# Patient Record
Sex: FEMALE | Race: WHITE | NOT HISPANIC OR LATINO | Employment: PART TIME | URBAN - METROPOLITAN AREA
[De-identification: names, ages, dates, MRNs, and addresses within clinical notes are randomized per-mention and may not be internally consistent; named-entity substitution may affect disease eponyms.]

---

## 2017-01-04 ENCOUNTER — TRANSCRIBE ORDERS (OUTPATIENT)
Dept: ADMINISTRATIVE | Facility: HOSPITAL | Age: 52
End: 2017-01-04

## 2017-01-04 DIAGNOSIS — Z12.31 OTHER SCREENING MAMMOGRAM: Primary | ICD-10-CM

## 2017-04-10 ENCOUNTER — HOSPITAL ENCOUNTER (OUTPATIENT)
Dept: MAMMOGRAPHY | Facility: MEDICAL CENTER | Age: 52
Discharge: HOME | End: 2017-04-10
Payer: COMMERCIAL

## 2017-04-10 DIAGNOSIS — Z12.31 ENCOUNTER FOR SCREENING MAMMOGRAM FOR MALIGNANT NEOPLASM OF BREAST: ICD-10-CM

## 2017-04-10 PROCEDURE — G0202 SCR MAMMO BI INCL CAD: HCPCS

## 2017-04-10 PROCEDURE — 77063 BREAST TOMOSYNTHESIS BI: CPT

## 2017-05-31 ENCOUNTER — ALLSCRIPTS OFFICE VISIT (OUTPATIENT)
Dept: OTHER | Facility: OTHER | Age: 52
End: 2017-05-31

## 2017-11-10 ENCOUNTER — TRANSCRIBE ORDERS (OUTPATIENT)
Dept: ADMINISTRATIVE | Facility: HOSPITAL | Age: 52
End: 2017-11-10

## 2017-11-10 ENCOUNTER — ALLSCRIPTS OFFICE VISIT (OUTPATIENT)
Dept: OTHER | Facility: OTHER | Age: 52
End: 2017-11-10

## 2017-11-10 DIAGNOSIS — R51.9 CHRONIC INTRACTABLE HEADACHE, UNSPECIFIED HEADACHE TYPE: Primary | ICD-10-CM

## 2017-11-10 DIAGNOSIS — G89.29 CHRONIC INTRACTABLE HEADACHE, UNSPECIFIED HEADACHE TYPE: Primary | ICD-10-CM

## 2017-11-10 DIAGNOSIS — R51.9 HEADACHE: ICD-10-CM

## 2017-11-11 NOTE — CONSULTS
Assessment    1  Migraine without status migrainosus, not intractable (346 90) (G43 909)   2  Headache, unspecified headache type (784 0) (R51)    Plan  Headache, unspecified headache type    · * MRI BRAIN WO CONTRAST; Status:Need Information - Financial Authorization; Requested for:10Nov2017;    Perform:[facility]; UXH:[id number]; Ordered;For:Headache, unspecified headache type; Ordered By:Whitney Hidalgo;  Migraine without status migrainosus, not intractable    · Amitriptyline HCl - 25 MG Oral Tablet; TAKE 1 TABLET AT BEDTIME   Rx By: Srini Brady; Dispense: 30 Days ; #:30 Tablet; Refill: 4;For: Migraine without status migrainosus, not intractable; GERTRUDE = N; Faxed To: [id number]   · Dexamethasone 2 MG Oral Tablet; TAKE 1 TABLET DAILY   Rx By: Srini Brady; Dispense: 5 Days ; #:5 Tablet; Refill: 0;Migraine without status migrainosus, not intractable; GERTRUDE = N; Faxed To: [facility]   · Frovatriptan Succinate 2 5 MG Oral Tablet; TAKE 1 TABLET AT ONSET OFHEADACHE  MAY REPEAT EVERY 2 HOURS AS NEEDED  MAXIMUM 3 TABLETS IN 24HOURS   Rx By: Srini Brady; Dispense: 0 Days ; #:8 Tablet; Refill: 1;Migraine without status migrainosus, not intractable; GERTRUDE = N; Faxed To: [facility]   · Follow-up visit in 3 months Evaluation and Treatment  Follow-up  Status: Complete Done: [id number]   Ordered;Migraine without status migrainosus, not intractable; Ordered By: Srini Brady Performed:  Due: 85DYR3633; Last Updated By: Estelle Ramirez; 11/10/2017 10:10:53 AM    Discussion/Summary  This is a 46year old  female who is here today as a new patient for headaches  Her headaches are migrainous in nature, but has a new type of headache since July which she states is different    I want to get MRI brain to make sure there is no structural abnormality   -Will do dexamethasone 2mg PO x 5 days as bridge therapy -will start amitriptyline 25 mg at bedtime once Decadron is finished  -advise her to wean off Topamax 25 every week, so 50 mg 25 mg from week and then stop  -Frovatriptan p r n  for abortive therapies she has menstrual migraines and slower onset with a gradual increase  Follow up with me in 3 months -encouraged regular hydration and sleep routine and to maintain headache diary  Advised the patient/guardian if they have any stroke like symptoms such as facial droop, weakness on either side, speech trouble, numbness, balance issues, any vision changes, or any new headache, to call 9-1-1 immediately or to proceed to the nearest ER immediately  Patient/guardian was counseled regarding instructions for management  Possible side effects of new medications were reviewed with the patient/guardian today  Treatment plan was reviewed with the patient/guardian  The patient/guardian understands and agrees with the plan  Chief Complaint  Chief Complaint Free Text Note Form: Patient present for consultation regarding migraine      History of Present Illness  This is a 46year old  female who is here today for evaluation of headaches  HPI:    51-year-old female who is here today as a new patient for evaluation of headaches  She states she started having headaches at the age of 6  She has some all day every day  she has muliple types of headaches: sharp pains, tenderness on the scalp, and then stress headaches  They are located on bifrontally, cervicogenic occipital, bitemporal   SHe describes her headache as throbbing, shooting, dull, searing  Associated symptoms with her headache:  Nausea, decreased appetite, sensitivity to light and sound  She prefers to lay in a quiet dark room  Triggers for her headaches are stress and menstruation  Abortive medications:  Excedrin, Motrin   Preventative medications: Topamax 25 mg 4 tabs daily which was started 5 years ago - initially was working both with intensity and frequency of her headache, but since July 2017 they were becoming more frequent, with scalp tenderness/sharp pain, which changed and her PCP recently changed to Topamax 100mg PO qdaily, and then she did not notice any change, and was changed back to 75mg PO qdaily  She has not had any recent imaging recently  Caffeine intake:      Review of Systems  Neurological ROS:  Constitutional: no fever, no chills, no recent weight gain, no recent weight loss, no complaints of feeling tired, no changes in appetite  HEENT: dryness of the eyes  Cardiovascular:  no chest pain or pressure, no palpitations present, the heart rate was not rapid or irregular, no swelling in the arms or legs, no poor circulation  Respiratory:  no unusual or persistant cough, no shortness of breath with or without exertion  Gastrointestinal:  no nausea, no vomiting, no diarrhea, no abdominal pain, no changes in bowel habits, no melena, no loss of bowel control  Genitourinary:  no incontinence, no feelings of urinary urgency, no increase in frequency, no urinary hesitancy, no dysuria, no hematuria  Musculoskeletal: head/neck/back pain  Integumentary  no masses, no rash, no skin lesions, no livedo reticularis  Psychiatric:  no anxiety, no depression, no mood swings, no psychiatric hospitalizations, no sleep problems  Endocrine  no unusual weight loss or gain, no excessive urination, no excessive thirst, no hair loss or gain, no hot or cold intolerance, no menstrual period change or irregularity, no loss of sexual ability or drive, no erection difficulty, no nipple discharge  Hematologic/Lymphatic:  no unusual bleeding, no tendency for easy bruising, no clotting skin or lumps  Neurological General: headache-- and-- waking up at night  Neurological Mental Status:  no confusion, no mood swings, no alteration or loss of consciousness, no difficulty expressing/understanding speech, no memory problems  Neurological Cranial Nerves: facial numbness or weakness    Neurological Motor findings include:  no tremor, no twitching, no cramping(pre/post exercise), no atrophy  Neurological Coordination:  no unsteadiness, no vertigo or dizziness, no clumsiness, no problems reaching for objects  Neurological Sensory:  no numbness, no pain, no tingling, does not fall when eyes closed or taking a shower  Neurological Gait:  no difficulty walking, not falling to one side, no sensation of being pushed, has not had falls  ROS Reviewed:   ROS reviewed  Active Problems    1  Benign essential hypertension (401 1) (I10)   2  Degenerative cervical disc (722 4) (M50 30)   3  Encounter for gynecological examination without abnormal finding (V72 31) (Z01 419)   4  Encounter for screening mammogram for malignant neoplasm of breast (V76 12) (Z12 31)   5  Gastroesophageal reflux disease without esophagitis (530 81) (K21 9)   6  Hyperlipidemia (272 4) (E78 5)   7  Irritable bowel syndrome (564 1) (K58 9)   8  Migraine without status migrainosus, not intractable (346 90) (G43 909)   9  Onychomycosis (110 1) (B35 1)   10  Other chest pain (786 59) (R07 89)    Past Medical History    1  History of Acute reaction to stress (308 9) (F43 0)   2  History of Acute reaction to stress (308 9) (F43 0)   3  History of Acute upper respiratory infection (465 9) (J06 9)   4  History of Amenorrhea (626 0) (N91 2)   5  History of Blood pressure elevated (401 9) (I10)   6  History of Colon cancer screening (V76 51) (Z12 11)   7  History of Cough (786 2) (R05)   8  History of Encounter for routine gynecological examination (V72 31) (Z01 419)   9  History of Encounter for routine gynecological examination with Papanicolaou smear of cervix (V72 31,V76 2) (Z01 419)   10  History of abdominal pain (V13 89) (Z87 898)   11  History of anemia (V12 3) (Z86 2)   12  History of chest pain (V13 89) (Z87 898)   13  History of contact dermatitis (V13 3) (Z87 2)   14  History of Crohn's disease (V12 70) (Z87 19)   15  History of diarrhea (V12 79) (Z87 898)   16   History of folliculitis (V13 3) (Z87 2)   17  History of glaucoma (V12 49) (Z86 69)   18  History of menorrhagia (V13 29) (Z87 42)   19  History of upper respiratory infection (V12 09) (Z87 09)   20  History of urinary frequency (V13 09) (Z87 898)   21  History of urinary frequency (V13 09) (Z87 898)   22  History of Need for influenza vaccination (V04 81) (Z23)   23  History of Other specified menopausal and perimenopausal disorders (627 8) (N95 8)   24  History of Other specified menopausal and perimenopausal disorders (627 8) (N95 8)   25  History of Rib cage region somatic dysfunction (739 8) (M99 08)   26  History of Screening for depression (V79 0) (Z13 89)   27  History of Screening for human papillomavirus (HPV) (V73 81) (Z11 51)   28  History of Urinary tract infection (599 0) (N39 0)   29  History of Vitamin D deficiency disease (268 9) (E55 9)  Active Problems And Past Medical History Reviewed: The active problems and past medical history were reviewed and updated today  Surgical History  1  History of Ablation Of Vaginal Lesion(S)   2  History of  Section   3  History of Colonoscopy   4  History of Diagnostic Esophagogastroduodenoscopy   5  History of Dilation And Curettage  Surgical History Reviewed: The surgical history was reviewed and updated today  Family History  Mother    1  Family history of arthritis (V17 7) (Z82 61)   2  Family history of colitis (V18 59) (Z83 79)  Father    3  Family history of hypertension (V17 49) (Z82 49)   4  Family history of non-Hodgkin's lymphoma (V16 7) (Z80 7)  Family History Reviewed: The family history was reviewed and updated today  Social History     · Being A Social Drinker   · Never A Smoker  Social History Reviewed: The social history was reviewed and updated today  The social history was reviewed and is unchanged  Current Meds   1  Dicyclomine HCl - 20 MG Oral Tablet; TAKE 1 TABLET EVERY 6 HOURS AS NEEDED;  Therapy: 55CCZ3440 to (Evaluate:79Mzn5501) Requested for: 97Eud8714; Last Rx:54Oee9032 Ordered   2  Famotidine 20 MG Oral Tablet; TAKE 1 TABLET TWICE DAILY; Therapy: 76IMH3460 to (Evaluate:36Rcd8025)  Requested for: 52CFL7339; Last Rx:18Wxb0446 Ordered   3  Losartan Potassium 25 MG Oral Tablet; TAKE 1/2 TO 1 TABLET BY MOUTH DAILY; Therapy: 69KNF6891 to (Marlin Sanchez)  Requested for: 34NPP8599; Last Rx:07Upv1335 Ordered   4  Lumigan 0 01 % Ophthalmic Solution; INSTILL 1 DROP INTO BOTH EYES ONCE DAILY IN THE EVENING; Therapy: (Ayad Barahona) to Recorded   5  Rizatriptan Benzoate 10 MG Oral Tablet; TAKE 1 TABLET AT ONSET OF MIGRAINE  MAY REPEAT IN 2 HOURS IF NEEDED  DO NOT EXCEED 3 TABS IN 24 HOURS; Therapy: 59WOO9648 to (Evaluate:80Irj1578)  Requested for: 05CLB6821; Last Rx:17Qwo6914 Ordered   6  Topiramate 25 MG Oral Tablet; TAKE 4 TABLET Daily; Therapy: 84Elu7666 to (Evaluate:98Ine1752)  Requested for: 39ZKS8069; Last Rx:02Ohq5438 Ordered  Medication List Reviewed: The medication list was reviewed and updated today  Allergies    1  Sulfa Drugs    Vitals  Signs   Recorded: 39CSW4776 09:30AM   Heart Rate: 74  Respiration: 16  Systolic: 888  Diastolic: 78  Height: 5 ft 6 in  Weight: 149 lb   BMI Calculated: 24 05  BSA Calculated: 1 76  O2 Saturation: 99    Physical Exam  General: Patient is not in any acute distress  Mental status: Alert, awake oriented x 3 and follows commands Heart: Normal heart sounds, no murmurs, rubs or gallops Chest: Clear to auscultation bilaterally Abdomen: soft and non-tender  Neurologic Examination:  Speech: Speech is fluent, reading, writing, naming, repetition and comprehension intact  Cranial Nerves: Pupils equal round reactive to light and extraocular movements intact  Visual fields full to confrontation  Fundus exam - normal, no papilledema noted  Facial sensation intact to soft touch in V1, V2 and V3 distributions  Face symmetric  Tongue midline, able to move side to side  Shoulder shrug strong     Motor: Strength 5/5 in all 4 extremities  No drift  Normal rapid alternating movements  Normal tone Sensory: Sensation intact to soft touch in all 4 extremities  Cerebellar: Finger-to-nose intact, normal heel to shin  Reflexes: 2+ in all 4 extremities, downgoing toes bilaterally Gait: Normal gait, tandem walking, normal arm to swing        Future Appointments    Date/Time Provider Specialty Site   01/02/2018 06:00 PM Kathyanne Sicard, DO Obstetrics/Gynecology [facility]   28/79/4381 73:18 PM [facility]       Signatures   Electronically signed by : Dante Nguyen MD; Nov 10 2017 10:11AM EST                       (Author)

## 2017-11-24 ENCOUNTER — HOSPITAL ENCOUNTER (OUTPATIENT)
Dept: MRI IMAGING | Facility: HOSPITAL | Age: 52
Discharge: HOME | End: 2017-11-24
Attending: PSYCHIATRY & NEUROLOGY
Payer: COMMERCIAL

## 2017-11-24 DIAGNOSIS — R51.9 HEADACHE: ICD-10-CM

## 2017-11-24 PROCEDURE — 70551 MRI BRAIN STEM W/O DYE: CPT

## 2017-11-30 ENCOUNTER — ALLSCRIPTS OFFICE VISIT (OUTPATIENT)
Dept: OTHER | Facility: OTHER | Age: 52
End: 2017-11-30

## 2017-12-05 NOTE — PROGRESS NOTES
Assessment    1  Benign essential hypertension (401 1) (I10)   2  Migraine without status migrainosus, not intractable (346 90) (G43 909)   3  Gastroesophageal reflux disease without esophagitis (530 81) (K21 9)    Plan  Hyperlipidemia    · (1) CBC/ PLT (NO DIFF); Status:Active - Retrospective By Protocol Authorization; Requested for:01Nov2018 03:10PM;    · (1) COMPREHENSIVE METABOLIC PANEL; Status:Active - Retrospective By Protocol  Authorization; Requested for:01Nov2018 03:10PM;    · (1) LIPID PANEL, FASTING; Status:Active - Retrospective By Protocol Authorization; Requested for:01Nov2018 03:10PM;    · (1) TSH; Status:Active - Retrospective By Protocol Authorization; Requested  [facility]:[id number] 03:10PM;     Discussion/Summary      1  HTN: stable, home reading of BP are also stable, patient to continue taking the losartan    2  Migraine: Stable  Patient being seen by neurology  Recently had medications changed which have improved the migraines  3  GERD: Stable  Continue with famotidine  Will see patient in 1 year  Labs ordered for one year: CBC, CMP, Lipid Panel, TSH  The patient was counseled regarding instructions for management  total time of encounter was 15 minutes and >50% minutes was spent counseling  Chief Complaint  F/U HTN  ksd,cma      History of Present Illness  The patient presents for follow-up of essential hypertension  The patient states she has been doing well with her blood pressure control since the last visit  Symptoms: denies impaired vision, denies dyspnea, denies chest pain and denies lower extremity edema  The patient presents with complaints of no headache (Has migraines but controlled, nothing related to BP)  Home monitoring: The patient checks her blood pressure regularly (checks once a week, just takes the Losartan PRN, )  Typical morning blood pressure readings are <954 systolic and <47 diastolic  Blood pressure control has been good     Medications: the patient is adherent with her medication regimen  (Takes Losartan PRN based off of BP reading ) She denies medication side effects  Patient presents to office today for 6 month follow up of blood pressure  Review of Systems    Eyes: no eyesight problems  Cardiovascular: no chest pain and no palpitations  Respiratory: no shortness of breath  Gastrointestinal: no nausea, no vomiting and no diarrhea  Neurological: no headache, no dizziness and no fainting  ROS reviewed  Active Problems    1  Benign essential hypertension (401 1) (I10)   2  Degenerative cervical disc (722 4) (M50 30)   3  Encounter for gynecological examination without abnormal finding (V72 31) (Z01 419)   4  Encounter for screening mammogram for malignant neoplasm of breast (V76 12)   (Z12 31)   5  Gastroesophageal reflux disease without esophagitis (530 81) (K21 9)   6  Headache, unspecified headache type (784 0) (R51)   7  Hyperlipidemia (272 4) (E78 5)   8  Irritable bowel syndrome (564 1) (K58 9)   9  Migraine without status migrainosus, not intractable (346 90) (G43 909)   10  Onychomycosis (110 1) (B35 1)   11  Other chest pain (786 59) (R07 89)    Past Medical History    1  History of Acute reaction to stress (308 9) (F43 0)   2  History of Acute reaction to stress (308 9) (F43 0)   3  History of Acute upper respiratory infection (465 9) (J06 9)   4  History of Amenorrhea (626 0) (N91 2)   5  History of Blood pressure elevated (401 9) (I10)   6  History of Colon cancer screening (V76 51) (Z12 11)   7  History of Cough (786 2) (R05)   8  History of Encounter for routine gynecological examination (V72 31) (Z01 419)   9  History of Encounter for routine gynecological examination with Papanicolaou smear of   cervix (V72 31,V76 2) (Z01 419)   10  History of abdominal pain (V13 89) (Z87 898)   11  History of anemia (V12 3) (Z86 2)   12  History of chest pain (V13 89) (Z87 898)   13  History of contact dermatitis (V13 3) (Z87 2)   14  History of Crohn's disease (V12 70) (Z87 19)   15  History of diarrhea (V12 79) (Z87 898)   16  History of folliculitis (C06 3) (Y92 1)   17  History of glaucoma (V12 49) (Z86 69)   18  History of menorrhagia (V13 29) (Z87 42)   19  History of upper respiratory infection (V12 09) (Z87 09)   20  History of urinary frequency (V13 09) (Z87 898)   21  History of urinary frequency (V13 09) (Z87 898)   22  History of Need for influenza vaccination (V04 81) (Z23)   23  History of Other specified menopausal and perimenopausal disorders (627 8) (N95 8)   24  History of Other specified menopausal and perimenopausal disorders (627 8) (N95 8)   25  History of Rib cage region somatic dysfunction (739 8) (M99 08)   26  History of Screening for depression (V79 0) (Z13 89)   27  History of Screening for human papillomavirus (HPV) (V73 81) (Z11 51)   28  History of Urinary tract infection (599 0) (N39 0)   29  History of Vitamin D deficiency disease (268 9) (E55 9)    Surgical History    1  History of Ablation Of Vaginal Lesion(S)   2  History of  Section   3  History of Colonoscopy   4  History of Diagnostic Esophagogastroduodenoscopy   5  History of Dilation And Curettage    Family History  Mother    1  Family history of arthritis (V17 7) (Z82 61)   2  Family history of colitis (V18 59) (Z83 79)  Father    3  Family history of hypertension (V17 49) (Z82 49)   4  Family history of non-Hodgkin's lymphoma (V16 7) (Z80 7)    Social History    · Being A Social Drinker   · Never A Smoker  The social history was reviewed and updated today  The social history was reviewed and is unchanged  Current Meds   1  Amitriptyline HCl - 25 MG Oral Tablet; TAKE 1 TABLET AT BEDTIME; Therapy: 52HFX2400 to (Evaluate:2018); Last Rx:2017 Ordered   2  Dicyclomine HCl - 20 MG Oral Tablet; TAKE 1 TABLET EVERY 6 HOURS AS NEEDED; Therapy: 59ZKR0927 to (Evaluate:32Ffe7763)  Requested for: 55Jbk4748; Last   Rx:02Uqg1433 Ordered   3  Famotidine 20 MG Oral Tablet; TAKE 1 TABLET TWICE DAILY; Therapy: 31NWW6733 to (Evaluate:14Mar2018)  Requested for: 89TAR2355; Last   Rx:14Nov2017 Ordered   4  Frovatriptan Succinate 2 5 MG Oral Tablet; TAKE 1 TABLET AT ONSET OF HEADACHE  MAY REPEAT EVERY 2 HOURS AS NEEDED  MAXIMUM 3 TABLETS IN 24 HOURS; Therapy: 74WGK6570 to (Last Rx:10Nov2017) Ordered   5  Losartan Potassium 25 MG Oral Tablet; TAKE 1/2 TO 1 TABLET BY MOUTH DAILY; Therapy: 84VBC1889 to (Robby Guillermo)  Requested for: 77CKJ5604; Last   Rx:05Hfc6998 Ordered   6  Lumigan 0 01 % Ophthalmic Solution; INSTILL 1 DROP INTO BOTH EYES ONCE DAILY   IN THE EVENING; Therapy: (Emogene Puff) to Recorded   7  Rizatriptan Benzoate 10 MG Oral Tablet; TAKE 1 TABLET AT ONSET OF MIGRAINE  MAY   REPEAT IN 2 HOURS IF NEEDED  DO NOT EXCEED 3 TABS IN 24 HOURS; Therapy: 70CDB5681 to (Evaluate:24Zto7614)  Requested for: 93GBB2657; Last   Rx:94Yfr7372 Ordered    The medication list was reviewed and updated today  Allergies    1  Sulfa Drugs    Vitals  Vital Signs    Recorded: 53HFN5822 22:98RH   Systolic 646   Diastolic 80   Height 5 ft 6 in   Weight 152 lb 2 oz   BMI Calculated 24 55   BSA Calculated 1 78     Physical Exam    Constitutional   General appearance: No acute distress, well appearing and well nourished  Eyes   Conjunctiva and lids: No swelling, erythema or discharge  Ears, Nose, Mouth, and Throat   External inspection of ears and nose: Normal     Pulmonary   Respiratory effort: No increased work of breathing or signs of respiratory distress  Auscultation of lungs: Clear to auscultation  Cardiovascular   Auscultation of heart: Normal rate and rhythm, normal S1 and S2, without murmurs      Musculoskeletal   Gait and station: Normal     Psychiatric   Orientation to person, place, and time: Normal     Mood and affect: Normal          Health Management  History of Colon cancer screening   COLONOSCOPY (GI, SURG); every 10 years; Last 03DUA8872; Next Due: 74Key9509;  Active    Future Appointments    Date/Time Provider Specialty Site   01/02/2018 06:00 PM Janetta Closs, DO Obstetrics/Gynecology [facility]   02/12/2018 02:00 PM Jeffrey Ballesteros MD Neurology  [address] [facility]   41/87/4963 35:13 PM [facility] Medicine [facility]     Signatures   Electronically signed by : rBiseyda Mcgrath DO; Nov 30 5879  3:25PM EST                       (Author)

## 2017-12-21 ENCOUNTER — GENERIC CONVERSION - ENCOUNTER (OUTPATIENT)
Dept: OTHER | Facility: OTHER | Age: 52
End: 2017-12-21

## 2018-01-02 ENCOUNTER — GENERIC CONVERSION - ENCOUNTER (OUTPATIENT)
Dept: OTHER | Facility: OTHER | Age: 53
End: 2018-01-02

## 2018-01-02 ENCOUNTER — ALLSCRIPTS OFFICE VISIT (OUTPATIENT)
Dept: OTHER | Facility: OTHER | Age: 53
End: 2018-01-02

## 2018-01-03 ENCOUNTER — LAB REQUISITION (OUTPATIENT)
Dept: LAB | Facility: HOSPITAL | Age: 53
End: 2018-01-03
Payer: COMMERCIAL

## 2018-01-03 DIAGNOSIS — R35.0 FREQUENCY OF MICTURITION: ICD-10-CM

## 2018-01-03 LAB
BILIRUB UR QL STRIP: NEGATIVE
CLARITY UR: CLEAR
COLOR UR: YELLOW
GLUCOSE UR STRIP-MCNC: NEGATIVE MG/DL
HGB UR QL STRIP.AUTO: NEGATIVE
KETONES UR STRIP-MCNC: NEGATIVE MG/DL
LEUKOCYTE ESTERASE UR QL STRIP: NEGATIVE
NITRITE UR QL STRIP: NEGATIVE
PH UR STRIP.AUTO: 7 [PH] (ref 4.5–8)
PROT UR STRIP-MCNC: NEGATIVE MG/DL
SP GR UR STRIP.AUTO: 1.02 (ref 1–1.03)
UROBILINOGEN UR QL STRIP.AUTO: 0.2 E.U./DL

## 2018-01-03 PROCEDURE — 81003 URINALYSIS AUTO W/O SCOPE: CPT | Performed by: OBSTETRICS & GYNECOLOGY

## 2018-01-03 NOTE — PROGRESS NOTES
Assessment   1  Encounter for routine gynecological examination (V72 31) (Z01 419)   2  Increased frequency of urination (788 41) (R35 0)    Plan   Dense breast tissue, Encounter for screening mammogram for malignant neoplasm of    breast    · MAMMO SCREENING BILATERAL W 3D & CAD; Status:Active - Retrospective    Authorization; Requested for:[id number]; Perform:[facility]; Due:84Dis7622; Last Updated Nakiaremy Lange; 1/2/2018 7:03:25 PM;Ordered; For:Dense breast tissue, Encounter for screening mammogram for malignant neoplasm of breast; Ordered By:Amaya Bunn; Increased frequency of urination    · (1) URINALYSIS w URINE C/S REFLEX (will reflex a microscopy if leukocytes, occult    blood, or nitrites are not within normal limits); Status:Active - Retrospective Authorization; Requested UQW:[id number]; Perform:[address]; JKW:[id number]; Last Updated Avril Wynn; 1/2/2018 6:54:49 PM;Ordered; For:Increased frequency of urination; Ordered By:Amaya Bunn; Discussion/Summary   health maintenance visit Currently, she eats an adequate diet and has an inadequate exercise regimen  cervical cancer screening is needed every three years next cervical cancer screening is due 2019 Breast cancer screening: monthly self breast exam was advised, mammogram has been ordered and mammogram is needed every year  Colorectal cancer screening: colorectal cancer screening is current  Advice and education were given regarding weight bearing exercise and reproductive health  Patient discussion: discussed with the patient  Annual examination was completed  Mammogram was ordered  We discussed her bladder related issues  I have suggested a program of regular aerobic exercise, dietary modification with bladder irritants  We also discussed Kegel's exercises in detail and she will implement this over the next 2 months   I also briefly discussed the benefit of vaginal estrogen therapy  The patient was initially somewhat reluctant to even consider this however will keep an open mind in the future  Patient was sent for urinalysis to rule out infectious etiologies patient to return to the office in 2 and half months for re-evaluation and potential initiation of vaginal estrogen  The patient has the current Goals: Maintain health  The patent has the current Barriers: None  Patient is able to Self-Care  Chief Complaint   Yearly- Patient had one episode of bleeding since ablation  Patient is having urinary frequency and urgency within this past year  History of Present Illness   HPI: Patient returns for annual gyn visit  Since last being seen patient is still without menses  She is bothered by urinary frequency as well as some urgency that has been more pronounced in the last 6 months  She has no postmenopausal bleeding  She has had screening colonoscopy  She has recent issues with headaches that are currently being managed by Neurology  GYN , Adult Female [facility]: The patient is being seen for a gynecology evaluation  General Health:      Lifestyle:  She does not exercise regularly  -- She does not use tobacco  The patient has never smoked cigarettes  -- She consumes alcohol  She reports occasional alcohol use  She typically drinks beer  -- She denies drug use  Reproductive health: the patient is perimenopausal--   she reports no menstrual problems  Menstrual history: Menstrual Problems: amenorrhea,-- ablation 2009 -- she uses contraception  For contraception, she has a partner with a vasectomy  -- she is sexually active  -- pregnancy history: G 4P 3(miscarriages: 1 )  Screening: Cervical cancer screening includes a pap smear performed 7/13/2015,-- human papilloma virus screening performed 7/13/2015-- and-- negative pap / negative HPV  Breast cancer screening includes a mammogram performed 4/10/2017-- and-- benign  Recommend 3-D   Colorectal cancer screening includes a colonoscopy performed 9/15/2016-- and-- 10 year intervals  Review of Systems        Constitutional: No fever, no chills, feels well, no tiredness, no recent weight gain or loss  ENT: no ear ache, no loss of hearing, no nosebleeds or nasal discharge, no sore throat or hoarseness  Cardiovascular: no complaints of slow or fast heart rate, no chest pain, no palpitations, no leg claudication or lower extremity edema  Respiratory: no complaints of shortness of breath, no wheezing, no dyspnea on exertion, no orthopnea or PND  Breasts: no complaints of breast pain, breast lump or nipple discharge  Gastrointestinal: no complaints of abdominal pain, no constipation, no nausea or diarrhea, no vomiting, no bloody stools  Genitourinary: as noted in HPI  Musculoskeletal: no complaints of arthralgia, no myalgia, no joint swelling or stiffness, no limb pain or swelling  Integumentary: no complaints of skin rash or lesion, no itching or dry skin, no skin wounds  Neurological: as noted in HPI  Active Problems   1  Acute bronchitis, unspecified organism (466 0) (J20 9)   2  Benign essential hypertension (401 1) (I10)   3  Degenerative cervical disc (722 4) (M50 30)   4  Encounter for gynecological examination without abnormal finding (V72 31) (Z01 419)   5  Encounter for screening mammogram for malignant neoplasm of breast (V76 12)     (Z12 31)   6  Gastroesophageal reflux disease without esophagitis (530 81) (K21 9)   7  Headache, unspecified headache type (784 0) (R51)   8  Hyperlipidemia (272 4) (E78 5)   9  Irritable bowel syndrome (564 1) (K58 9)   10  Migraine without status migrainosus, not intractable (346 90) (G43 909)   11  Onychomycosis (110 1) (B35 1)   12   Other chest pain (786 59) (R07 89)    Past Medical History    · History of Acute reaction to stress (308 9) (F43 0)   · History of Acute reaction to stress (308 9) (F43 0)   · History of Acute upper respiratory infection (465 9) (J06 9)   · History of Amenorrhea (626 0) (N91 2)   · History of Blood pressure elevated (401 9) (I10)   · History of Colon cancer screening (V76 51) (Z12 11)   · History of Cough (786 2) (R05)   · History of Encounter for routine gynecological examination with Papanicolaou smear of    cervix (V72 31,V76 2) (Z01 419)   · History of abdominal pain (V13 89) (F08 922)   · History of anemia (V12 3) (Z86 2)   · History of chest pain (V13 89) (S76 527)   · History of contact dermatitis (V13 3) (Z87 2)   · History of Crohn's disease (V12 70) (Z87 19)   · History of diarrhea (V12 79) (Y88 868)   · History of folliculitis (A02 5) (D48 1)   · History of glaucoma (V12 49) (Z86 69)   · History of menorrhagia (V13 29) (Z87 42)   · History of upper respiratory infection (V12 09) (Z87 09)   · History of urinary frequency (V13 09) (K26 081)   · History of Need for influenza vaccination (V04 81) (Z23)   · History of Other specified menopausal and perimenopausal disorders (627 8) (N95 8)   · History of Other specified menopausal and perimenopausal disorders (627 8) (N95 8)   · History of Rib cage region somatic dysfunction (739 8) (M99 08)   · History of Screening for depression (V79 0) (Z13 89)   · History of Screening for human papillomavirus (HPV) (V73 81) (Z11 51)   · History of Urinary tract infection (599 0) (N39 0)   · History of Vitamin D deficiency disease (268 9) (E55 9)    Surgical History    · History of Ablation Of Vaginal Lesion(S)   · History of  Section   · History of Colonoscopy   · History of Diagnostic Esophagogastroduodenoscopy   · History of Dilation And Curettage    Family History   Mother    · Family history of arthritis (V17 7) (Z82 61)   · Family history of colitis (V18 59) (Z83 79)  Father    · Family history of hypertension (V17 49) (Z82 49)   · Family history of non-Hodgkin's lymphoma (V16 7) (Z80 7)    Social History    · Being A Social Drinker   · Never A Smoker    Current Meds    1  Amitriptyline HCl - 25 MG Oral Tablet; TAKE 1 TABLET AT BEDTIME; Therapy: 97MAJ2260 to (Evaluate:09Apr2018); Last Rx:10Nov2017 Ordered   2  Dicyclomine HCl - 20 MG Oral Tablet; TAKE 1 TABLET EVERY 6 HOURS AS NEEDED; Therapy: 62PFC0569 to (Evaluate:07Dec2016)  Requested for: 05Bnp2028; Last     Rx:09Aug2016 Ordered   3  Famotidine 20 MG Oral Tablet; TAKE 1 TABLET TWICE DAILY; Therapy: 28ZVI3455 to (Evaluate:14Mar2018)  Requested for: 27YDI0672; Last     Rx:14Nov2017 Ordered   4  Frovatriptan Succinate 2 5 MG Oral Tablet; TAKE 1 TABLET AT ONSET OF HEADACHE  MAY REPEAT EVERY 2 HOURS AS NEEDED  MAXIMUM 3 TABLETS IN 24 HOURS; Therapy: 44OJV3732 to (Last Rx:10Nov2017) Ordered   5  Lumigan 0 01 % Ophthalmic Solution; INSTILL 1 DROP INTO BOTH EYES ONCE DAILY     IN THE EVENING; Therapy: (Recorded:09Aug2016) to Recorded    Allergies   1  Sulfa Drugs    Vitals    Recorded: 62VZD6939 88:57ZD   Systolic 180, LUE, Sitting   Diastolic 76, LUE, Sitting   Height 5 ft 5 in   Weight 154 lb    BMI Calculated 25 63   BSA Calculated 1 77   LMP ablation     Physical Exam        Constitutional      General appearance: No acute distress, well appearing and well nourished  Neck      Neck: Normal, supple, trachea midline, no masses  Thyroid: Normal, no thyromegaly  Pulmonary      Respiratory effort: No increased work of breathing or signs of respiratory distress  Cardiovascular      Peripheral vascular exam: Normal pulses Throughout  Genitourinary      External genitalia: Normal and no lesions appreciated  Vagina: Normal, no lesions or dryness appreciated  Urethra: Normal        Urethral meatus: Normal        Bladder: Normal, soft, non-tender and no prolapse or masses appreciated  Cervix: Normal, no palpable masses  Uterus: Normal, non-tender, not enlarged, and no palpable masses         Adnexa/parametria: Normal, non-tender and no fullness or masses appreciated  Anus, perineum, and rectum: Normal sphincter tone, no masses, and no prolapse  Chest      Breasts: Normal and no dimpling or skin changes noted  Abdomen      Abdomen: Normal, non-tender, and no organomegaly noted  Liver and spleen: No hepatomegaly or splenomegaly  Examination for hernias: No hernias appreciated  Lymphatic      Palpation of lymph nodes in neck, axillae, groin and/or other locations: No lymphadenopathy or masses noted  Skin      Skin and subcutaneous tissue: Normal skin turgor and no rashes         Palpation of skin and subcutaneous tissue: Normal        Psychiatric      Orientation to person, place, and time: Normal        Mood and affect: Normal        Future Appointments      Date/Time Provider Specialty Site   03/06/2018 05:00 PM Dillon Briggs DO Obstetrics/Gynecology [facility]   01/14/2019 05:40 PM Dillon Briggs DO Obstetrics/Gynecology [facility]   02/12/2018 02:00 PM Courtney Santos MD Neurology [place] [address]   78/03/4078 61:87 PM [facility]     Signatures    Electronically signed by : Julia Ma DO; Jan 2 2018  7:15PM EST                       (Author)

## 2018-01-04 ENCOUNTER — GENERIC CONVERSION - ENCOUNTER (OUTPATIENT)
Dept: OTHER | Facility: OTHER | Age: 53
End: 2018-01-04

## 2018-01-10 NOTE — PROGRESS NOTES
Assessment    1  Benign essential hypertension (401 1) (I10)   2  Hyperlipidemia (272 4) (E78 5)   3  Migraine headache (346 90) (G43 909)   4  Vitamin D deficiency disease (268 9) (E55 9)    Plan  Benign essential hypertension    · Begin a limited exercise program ; Status:Complete;   Done: 12WXG0164 08:05AM   · Eat a low fat and low cholesterol diet ; Status:Complete;   Done: 51NMR0303 08:05AM   · Keep a diary of when and what you eat ; Status:Complete;   Done: 08GUL0332 08:05AM   · Take your blood pressure twice a day, varying the time of day you check it  Record the  numbers, and bring them with you to your appointment ; Status:Complete;   Done:  35DPP2259 08:05AM   · We encourage you to begin to make lifestyle changes to help control your blood  pressure  These may include losing weight, increasing your activity level, limiting salt in  your diet, decreasing alcohol intake, and eating a diet low in fat and rich in fruits  and vegetables ; Status:Complete;   Done: 79VUG9164 08:05AM   · We recommend that you bring your body mass index down to 26 ; Status:Complete;    Done: 23RFO3490 08:05AM   · We recommend that you follow the "Mediterranean diet "; Status:Complete;   Done:  68NYV2493 08:05AM   · Call [phone] if: You develop double vision (see two of everything) ;  Status:Complete;   Done: 63QDI2892 08:05AM   · Call [phone] if: Your blood pressure is frequently higher than 140/90 ;  Status:Complete;   Done: 05FWS2026 08:05AM   · Call 911 if: You experience a new kind of chest pain (angina) or pressure ;  Status:Complete;   Done: 00PLP9287 08:05AM   · Call 911 if: You have any symptoms of a stroke ; Status:Complete;   Done: 65YFK8980  08:05AM  Migraine headache    · Rizatriptan Benzoate 10 MG Oral Tablet; TAKE 1 TABLET AT ONSET OF MIGRAINE   MAY REPEAT IN 2 HOURS IF NEEDED  DO NOT EXCEED 3 TABS IN 24 HOURS    Discussion/Summary    BP and labs stable except D low  Rec at least 2000 units D q day  dana lab 1 yr  BP 6 mo  Prevnar revd  GYN UTD  Change RX to maxalt AS  Possible side effects of new medications were reviewed with the patient/guardian today  The treatment plan was reviewed with the patient/guardian  The patient/guardian understands and agrees with the treatment plan   The patient was counseled regarding diagnostic results, instructions for management, risk factor reductions, prognosis, patient and family education, impressions, risks and benefits of treatment options, importance of compliance with treatment  total time of encounter was 25 minutes and > 50% minutes was spent counseling  Chief Complaint  Pt is here for a six month fu for HL  History of Present Illness  The patient is being seen for a routine clinic follow-up of migraine headache  The patient is currently asymptomatic  The patient is currently experiencing symptoms  The patient presents for follow-up of essential hypertension  The patient states she has been doing well with her blood pressure control since the last visit  Comorbid Illnesses: hyperlip  She has no significant interval events  Symptoms: The patient is currently asymptomatic    47 yo wf for rov and lab rev  Feels well  Needs new triptan bc of $$  Now 50 tf due for cscope  Review of Systems    Constitutional: No fever, no chills, feels well, no tiredness, no recent weight gain or weight loss  Eyes: No complaints of eye pain, no red eyes, no eyesight problems, no discharge, no dry eyes, no itching of eyes  ENT: no complaints of earache, no loss of hearing, no nose bleeds, no nasal discharge, no sore throat, no hoarseness  Cardiovascular: No complaints of slow heart rate, no fast heart rate, no chest pain, no palpitations, no leg claudication, no lower extremity edema  Respiratory: No complaints of shortness of breath, no wheezing, no cough, no SOB on exertion, no orthopnea, no PND     Gastrointestinal: No complaints of abdominal pain, no constipation, no nausea or vomiting, no diarrhea, no bloody stools  Genitourinary: No complaints of dysuria, no incontinence, no pelvic pain, no dysmenorrhea, no vaginal discharge or bleeding  Musculoskeletal: No complaints of arthralgias, no myalgias, no joint swelling or stiffness, no limb pain or swelling  Integumentary: No complaints of skin rash or lesions, no itching, no skin wounds, no breast pain or lump  Neurological: No complaints of headache, no confusion, no convulsions, no numbness, no dizziness or fainting, no tingling, no limb weakness, no difficulty walking  Psychiatric: Not suicidal, no sleep disturbance, no anxiety or depression, no change in personality, no emotional problems  Endocrine: No complaints of proptosis, no hot flashes, no muscle weakness, no deepening of the voice, no feelings of weakness  Hematologic/Lymphatic: No complaints of swollen glands, no swollen glands in the neck, does not bleed easily, does not bruise easily  Active Problems    1  Benign essential hypertension (401 1) (I10)   2  Blood pressure elevated (401 9) (I10)   3  Degenerative cervical disc (722 4) (M50 30)   4  Encounter for routine gynecological examination (V72 31) (Z01 419)   5  Encounter for routine gynecological examination with Papanicolaou smear of cervix   (V72 31,V76 2) (Z01 419)   6  Encounter for screening mammogram for malignant neoplasm of breast (V76 12)   (Z12 31)   7  Hyperlipidemia (272 4) (E78 5)   8  Irritable bowel syndrome (564 1) (K58 9)   9  Migraine headache (346 90) (G43 909)   10  Onychomycosis (110 1) (B35 1)   11  Other specified menopausal and perimenopausal disorders (627 8) (N95 8)   12  Screening for human papillomavirus (HPV) (V73 81) (Z11 51)   13  Vitamin D deficiency disease (268 9) (E55 9)    Past Medical History    1  History of Acute reaction to stress (308 9) (F43 0)   2  History of Acute reaction to stress (308 9) (F43 0)   3   History of Amenorrhea (626 0) (N91 2)   4  History of Cough (786 2) (R05)   5  History of chest pain (V13 89) (Z87 898)   6  History of contact dermatitis (V13 3) (Z87 2)   7  History of Crohn's disease (V12 70) (Z87 19)   8  History of folliculitis (Q23 0) (Y27 0)   9  History of glaucoma (V12 49) (Z86 69)   10  History of menorrhagia (V13 29) (Z87 42)   11  History of upper respiratory infection (V12 09) (Z87 09)   12  History of urinary frequency (V13 09) (Z87 898)   13  History of urinary frequency (V13 09) (Z87 898)   14  History of Need for influenza vaccination (V04 81) (Z23)   15  History of Other specified menopausal and perimenopausal disorders (627 8) (N95 8)   16  History of Rib cage region somatic dysfunction (739 8) (M99 08)   17  History of Urinary tract infection (599 0) (N39 0)    Surgical History    1  History of Ablation Of Vaginal Lesion(S)   2  History of  Section   3  History of Dilation And Curettage    Family History  Mother    1  Family history of arthritis (V17 7) (Z82 61)  Father    2  Family history of hypertension (V17 49) (Z82 49)   3  Family history of non-Hodgkin's lymphoma (V16 7) (Z80 7)    Social History    · Being A Social Drinker   · Never A Smoker    Current Meds   1  Losartan Potassium 25 MG Oral Tablet; TAKE 1/2-1 TABLET BY MOUTH ONCE DAILY; Therapy: 16PMI3262 to (Madie Found)  Requested for: 07GMB5175; Last   Rx:2015 Ordered   2  Relpax 40 MG Oral Tablet; takeone tab at onset of migraine  may repeat in 2hours  no   more than 2 tabs in 24hours  no more than 3 days week; Therapy: 07XNM1311 to (Evaluate:60Kxs0449)  Requested for: 28PFB5040; Last   Rx:83Ozb7637 Ordered   3  Topiramate 25 MG Oral Tablet; take 3 tablets daily; Therapy: 21Ryl4858 to (Evaluate:83Xbl0072)  Requested for: 89Iva8588; Last   Rx:09Bro7353 Ordered    Allergies    1   Sulfa Drugs    Vitals  Vital Signs [Data Includes: Current Encounter]    Recorded: 03VLZ4574 96:40YP   Systolic 139   Diastolic 78   Height 5 ft 6 3 in   Weight 139 lb 6 08 oz   BMI Calculated 22 29   BSA Calculated 1 72     Physical Exam    Constitutional   General appearance: No acute distress, well appearing and well nourished  Eyes   Conjunctiva and lids: No swelling, erythema or discharge  Pupils and irises: Equal, round and reactive to light  Ears, Nose, Mouth, and Throat   External inspection of ears and nose: Normal     Otoscopic examination: Tympanic membranes translucent with normal light reflex  Canals patent without erythema  Nasal mucosa, septum, and turbinates: Normal without edema or erythema  Oropharynx: Normal with no erythema, edema, exudate or lesions  Pulmonary   Respiratory effort: No increased work of breathing or signs of respiratory distress  Auscultation of lungs: Clear to auscultation  Cardiovascular   Auscultation of heart: Normal rate and rhythm, normal S1 and S2, without murmurs  Examination of extremities for edema and/or varicosities: Normal     Carotid pulses: Normal     Abdomen   Abdomen: Non-tender, no masses  Liver and spleen: No hepatomegaly or splenomegaly  Lymphatic   Palpation of lymph nodes in neck: No lymphadenopathy  Musculoskeletal   Gait and station: Normal     Digits and nails: Normal without clubbing or cyanosis  Inspection/palpation of joints, bones, and muscles: Normal     Neurologic   Cranial nerves: Cranial nerves 2-12 intact      Psychiatric   Orientation to person, place, and time: Normal     Mood and affect: Normal          Results/Data  Encounter Results   (1) LIPID PANEL, FASTING 69NKP1577 20:73JT Davion Paul     Test Name Result Flag Reference   CHOLESTEROL 227     HDL,DIRECT 67     LDL CHOLESTEROL CALCULATED 151     TRIGLYCERIDES 47       (1) THIN PREP PAP WITH IMAGING 50HQH7638 71:84FY Davion Paul     Test Name Result Flag Reference   THIN PREP PAP W  IMAG COMMENT 07/13/2015       Summary / No summary entered :      No summary entered  Documents attached : GYN HM/Annual Visit (GYN HM/Annual Visit) - Teresa Cota; Enc: [id number] -      Appointment - Teresa Villatoroland - (Obstetrics/Gynecology) (Additional Information      Document)    Future Appointments    Date/Time Provider Specialty Site   17/18/9242 37:54 PM [facility]     Signatures   Electronically signed by : Efrain Mendoza DO; May 17 2166  8:06AM EST                       (Author)

## 2018-01-13 VITALS
BODY MASS INDEX: 23.95 KG/M2 | SYSTOLIC BLOOD PRESSURE: 114 MMHG | HEIGHT: 66 IN | DIASTOLIC BLOOD PRESSURE: 68 MMHG | WEIGHT: 149 LBS

## 2018-01-14 VITALS
DIASTOLIC BLOOD PRESSURE: 80 MMHG | WEIGHT: 152.13 LBS | SYSTOLIC BLOOD PRESSURE: 126 MMHG | BODY MASS INDEX: 24.45 KG/M2 | HEIGHT: 66 IN

## 2018-01-14 VITALS
OXYGEN SATURATION: 99 % | HEIGHT: 66 IN | HEART RATE: 74 BPM | SYSTOLIC BLOOD PRESSURE: 118 MMHG | BODY MASS INDEX: 23.95 KG/M2 | WEIGHT: 149 LBS | RESPIRATION RATE: 16 BRPM | DIASTOLIC BLOOD PRESSURE: 78 MMHG

## 2018-01-23 VITALS
HEIGHT: 65 IN | SYSTOLIC BLOOD PRESSURE: 120 MMHG | BODY MASS INDEX: 25.66 KG/M2 | DIASTOLIC BLOOD PRESSURE: 76 MMHG | WEIGHT: 154 LBS

## 2018-01-23 NOTE — MISCELLANEOUS
Message   Recorded as Task   Date: 01/04/2018 01:41 PM, Created By: Austin August   Task Name: Follow Up   Assigned To: Edwardoisai Ziyad   Regarding Patient: Nancy Bhatti, Status: In Progress   Massimo Slim - 04 Jan 2018 1:41 PM     TASK CREATED  inform pt UA negative   Hailey Mccarthy - 04 Jan 2018 1:41 PM     TASK IN PROGRESS   Hailey Mccarthy - 04 Jan 2018 1:52 PM     TASK EDITED  Spoke with Pt today via phone call  Pt informed that Dr Ally Knox reviewed Pt's recent UA test result, result was negative per Dr Yair Salmeron review  Reiterated to Pt that if symptoms should return or she has any questions/concerns, to contact office  Active Problems    1  Acute bronchitis, unspecified organism (466 0) (J20 9)   2  Benign essential hypertension (401 1) (I10)   3  Degenerative cervical disc (722 4) (M50 30)   4  Dense breast tissue (793 82) (R92 2)   5  Encounter for gynecological examination without abnormal finding (V72 31) (Z01 419)   6  Encounter for routine gynecological examination (V72 31) (Z01 419)   7  Encounter for screening mammogram for malignant neoplasm of breast (V76 12)   (Z12 31)   8  Gastroesophageal reflux disease without esophagitis (530 81) (K21 9)   9  Headache, unspecified headache type (784 0) (R51)   10  Hyperlipidemia (272 4) (E78 5)   11  Increased frequency of urination (788 41) (R35 0)   12  Irritable bowel syndrome (564 1) (K58 9)   13  Migraine without status migrainosus, not intractable (346 90) (G43 909)   14  Onychomycosis (110 1) (B35 1)   15  Other chest pain (786 59) (R07 89)    Current Meds   1  Amitriptyline HCl - 25 MG Oral Tablet; TAKE 1 TABLET AT BEDTIME; Therapy: 71TCB6117 to (Evaluate:09Apr2018); Last Rx:10Nov2017 Ordered   2  Dicyclomine HCl - 20 MG Oral Tablet; TAKE 1 TABLET EVERY 6 HOURS AS NEEDED; Therapy: 20WBY7405 to (Evaluate:92Eyh5055)  Requested for: 69Kyd8270; Last   Rx:09Aug2016 Ordered   3   Famotidine 20 MG Oral Tablet; TAKE 1 TABLET TWICE DAILY; Therapy: 09RKK5370 to (Evaluate:14Mar2018)  Requested for: 90OHP1623; Last   Rx:14Nov2017 Ordered   4  Frovatriptan Succinate 2 5 MG Oral Tablet; TAKE 1 TABLET AT ONSET OF HEADACHE  MAY REPEAT EVERY 2 HOURS AS NEEDED  MAXIMUM 3 TABLETS IN 24 HOURS; Therapy: 68JHS5115 to (Last Rx:10Nov2017) Ordered   5  Lumigan 0 01 % Ophthalmic Solution; INSTILL 1 DROP INTO BOTH EYES ONCE DAILY   IN THE EVENING; Therapy: (Recorded:91Weh7479) to Recorded    Allergies    1   Sulfa Drugs    Signatures   Electronically signed by : Yu Meneses MA; Jan 4 2018  1:52PM EST                       (Author)

## 2018-01-24 VITALS
OXYGEN SATURATION: 96 % | WEIGHT: 150.38 LBS | HEART RATE: 100 BPM | TEMPERATURE: 99.8 F | SYSTOLIC BLOOD PRESSURE: 116 MMHG | BODY MASS INDEX: 24.17 KG/M2 | HEIGHT: 66 IN | DIASTOLIC BLOOD PRESSURE: 84 MMHG

## 2018-02-12 ENCOUNTER — OFFICE VISIT (OUTPATIENT)
Dept: NEUROLOGY | Facility: CLINIC | Age: 53
End: 2018-02-12
Payer: COMMERCIAL

## 2018-02-12 VITALS
DIASTOLIC BLOOD PRESSURE: 76 MMHG | HEART RATE: 92 BPM | BODY MASS INDEX: 24.67 KG/M2 | SYSTOLIC BLOOD PRESSURE: 108 MMHG | HEIGHT: 67 IN | WEIGHT: 157.2 LBS

## 2018-02-12 DIAGNOSIS — G43.709 CHRONIC MIGRAINE WITHOUT AURA WITHOUT STATUS MIGRAINOSUS, NOT INTRACTABLE: Primary | ICD-10-CM

## 2018-02-12 PROCEDURE — 99214 OFFICE O/P EST MOD 30 MIN: CPT | Performed by: PSYCHIATRY & NEUROLOGY

## 2018-02-12 RX ORDER — FAMOTIDINE 20 MG/1
TABLET, FILM COATED ORAL
Refills: 2 | COMMUNITY
Start: 2018-01-12 | End: 2018-05-14 | Stop reason: ALTCHOICE

## 2018-02-12 RX ORDER — LATANOPROST 50 UG/ML
1 SOLUTION/ DROPS OPHTHALMIC
COMMUNITY

## 2018-02-12 RX ORDER — PROPRANOLOL HYDROCHLORIDE 40 MG/1
40 TABLET ORAL 3 TIMES DAILY
Qty: 90 TABLET | Refills: 4 | Status: SHIPPED | OUTPATIENT
Start: 2018-02-12 | End: 2018-12-13 | Stop reason: ALTCHOICE

## 2018-02-12 RX ORDER — DICYCLOMINE HCL 20 MG
20 TABLET ORAL AS NEEDED
COMMUNITY
End: 2021-12-17 | Stop reason: SDUPTHER

## 2018-02-12 RX ORDER — AMITRIPTYLINE HYDROCHLORIDE 25 MG/1
1 TABLET, FILM COATED ORAL
COMMUNITY
Start: 2017-11-10 | End: 2018-05-14 | Stop reason: ALTCHOICE

## 2018-02-12 NOTE — PROGRESS NOTES
Patient ID: Cheyenne Moses is a 46 y o  female  Assessment/Plan: This is a 46year old F who is here is a follow up of migraine without auras  Her headaches on his initially improved with amitriptyline 25 mg at bedtime on but she noticed after new year's she started having more frequent headaches 16+ headaches per month  She would like to try a new medication at this point  Heart rate today is 92 in the office  MRI brain with and without contrast was done at during the last visit which I personally reviewed which was unremarkable  Plan  -will start propanolol 40 mg t i d  for migraine prevention, I advised the patient to give me a call in 4-6 weeks and let me know heart rate on which she does check when she is at home and let me know the progress of her headaches if on her headaches continue to be a severe or frequent I will increase the dose of propanolol at that time   -continue with Maxalt p r n  as abortive therapy  -she is not drinking as much coffee also is not taking Motrin like she is to every single day  I will plan for to return to the office in 3  months, I would be happy to see them sooner if the need arises  Patient/Guardian was advised to the call the office if they have any questions and concerns in the meantime  Patient/Guardian does understand that if they have any new stroke like symptoms such as facial droop on one side, weakness/paralysis on either side, speech trouble, numbness on one side, balance issues, any vision changes, or any new headache, to call 9-1-1 immediately or to proceed to the nearest ER immediately  Problem List Items Addressed This Visit     Chronic migraine without aura without status migrainosus, not intractable - Primary    Relevant Medications    amitriptyline (ELAVIL) 25 mg tablet    propranolol (INDERAL) 40 mg tablet             Subjective:    HPI    55-year-old female who is here as a follow-up for migraine headaches without aura    She was started on amitriptyline 25 mg at bedtime during her last visit she was having them every single day  Her headaches were initially improved while she was on amitriptyline 25 mg at bedtime which she noticed after new year's she had a round of bronchitis in the and since then she has been having headaches more frequently and more intense  She is having 16 per month now  On a scale of 1-10 her headaches are 5/10  She Her headaches located top and back of her head  The following portions of the patient's history were reviewed and updated as appropriate:   She  has a past medical history of Degenerative disc disease, cervical; Hyperlipidemia; Hypertension; IBS (irritable bowel syndrome); and Migraine  She  does not have any pertinent problems on file  She  has a past surgical history that includes  section; Dilation and curettage of uterus; and pr colonoscopy flx dx w/collj spec when pfrmd (N/A, 9/15/2016)  Her family history is not on file  She  reports that she has never smoked  She has never used smokeless tobacco  She reports that she drinks alcohol  She reports that she does not use drugs  Current Outpatient Prescriptions   Medication Sig Dispense Refill    amitriptyline (ELAVIL) 25 mg tablet Take 1 tablet by mouth      dicyclomine (BENTYL) 20 mg tablet Take 20 mg by mouth every 6 (six) hours      famotidine (PEPCID) 20 mg tablet   2    latanoprost (XALATAN) 0 005 % ophthalmic solution 1 drop daily at bedtime      LOSARTAN POTASSIUM PO Take 25 mg by mouth daily Indications: Take 1/2 -1 tablet by mouth once daily   bimatoprost (LUMIGAN) 0 01 % ophthalmic drops 1 drop daily at bedtime Indications: Instill 1 drop into both eyes once daily in the evening   propranolol (INDERAL) 40 mg tablet Take 1 tablet (40 mg total) by mouth 3 (three) times a day 90 tablet 4    rizatriptan (MAXALT) 10 MG tablet Take 10 mg by mouth as needed for migraine Indications:  Take 1 tablet at onset of migraine  May repeat in 2 hours if needed  Do not exceed 3 tabs in 24 hours  No current facility-administered medications for this visit  Current Outpatient Prescriptions on File Prior to Visit   Medication Sig    LOSARTAN POTASSIUM PO Take 25 mg by mouth daily Indications: Take 1/2 -1 tablet by mouth once daily   bimatoprost (LUMIGAN) 0 01 % ophthalmic drops 1 drop daily at bedtime Indications: Instill 1 drop into both eyes once daily in the evening   rizatriptan (MAXALT) 10 MG tablet Take 10 mg by mouth as needed for migraine Indications: Take 1 tablet at onset of migraine  May repeat in 2 hours if needed  Do not exceed 3 tabs in 24 hours   [DISCONTINUED] topiramate (TOPAMAX) 25 mg tablet Take 25 mg by mouth 3 (three) times a day  No current facility-administered medications on file prior to visit  She is allergic to sulfa antibiotics            Objective:    Blood pressure 108/76, pulse 92, height 5' 6 5" (1 689 m), weight 71 3 kg (157 lb 3 2 oz)  Physical Exam  General alert, awake oriented follows commands  Speech fluent no dysarthria no aphasia noted  Skin no lesions   chest nonlabored breathing  Neurological Exam  The mental status:  Alert awake oriented  Speech: fluent no dysarthria noted  Cranial nerves: pupils equal round reactive to light accommodation extraocular movements intact no facial droop noted facial sensation intact positive shoulder shrug  Motor 5/5 throughout    ROS:    Review of Systems   Constitutional: Positive for unexpected weight change (weight gain)  HENT: Negative  Eyes: Negative  Respiratory: Positive for chest tightness  Cardiovascular: Negative  Gastrointestinal: Negative  Endocrine: Negative  Genitourinary: Negative  Musculoskeletal: Negative  Skin: Negative  Allergic/Immunologic: Negative  Neurological: Positive for headaches  Hematological: Negative  Psychiatric/Behavioral: Negative

## 2018-03-12 ENCOUNTER — TELEPHONE (OUTPATIENT)
Dept: NEUROLOGY | Facility: CLINIC | Age: 53
End: 2018-03-12

## 2018-03-12 NOTE — TELEPHONE ENCOUNTER
Just as FYI, pt calling to give an update-no response required    Pt Now on propranolol, 40mg  three times a day  BP average 128/78  She states "Headaches are better, have had a few breakthrough headaches   They are better than before "

## 2018-04-10 DIAGNOSIS — Z12.31 ENCOUNTER FOR SCREENING MAMMOGRAM FOR MALIGNANT NEOPLASM OF BREAST: ICD-10-CM

## 2018-04-10 DIAGNOSIS — R92.2 INCONCLUSIVE MAMMOGRAM: ICD-10-CM

## 2018-04-25 ENCOUNTER — TRANSCRIBE ORDERS (OUTPATIENT)
Dept: RADIOLOGY | Facility: CLINIC | Age: 53
End: 2018-04-25

## 2018-04-30 ENCOUNTER — HOSPITAL ENCOUNTER (OUTPATIENT)
Dept: RADIOLOGY | Age: 53
Discharge: HOME | End: 2018-04-30
Payer: COMMERCIAL

## 2018-04-30 DIAGNOSIS — R92.2 INCONCLUSIVE MAMMOGRAM: ICD-10-CM

## 2018-04-30 DIAGNOSIS — Z12.31 ENCOUNTER FOR SCREENING MAMMOGRAM FOR MALIGNANT NEOPLASM OF BREAST: ICD-10-CM

## 2018-04-30 PROCEDURE — 77067 SCR MAMMO BI INCL CAD: CPT

## 2018-04-30 PROCEDURE — 77063 BREAST TOMOSYNTHESIS BI: CPT

## 2018-05-14 ENCOUNTER — OFFICE VISIT (OUTPATIENT)
Dept: OBGYN CLINIC | Facility: CLINIC | Age: 53
End: 2018-05-14
Payer: COMMERCIAL

## 2018-05-14 VITALS — SYSTOLIC BLOOD PRESSURE: 90 MMHG | DIASTOLIC BLOOD PRESSURE: 60 MMHG | WEIGHT: 149 LBS | BODY MASS INDEX: 23.69 KG/M2

## 2018-05-14 DIAGNOSIS — R39.15 URINARY URGENCY: Primary | ICD-10-CM

## 2018-05-14 PROCEDURE — 99213 OFFICE O/P EST LOW 20 MIN: CPT | Performed by: OBSTETRICS & GYNECOLOGY

## 2018-05-14 RX ORDER — FROVATRIPTAN SUCCINATE 2.5 MG/1
TABLET, FILM COATED ORAL AS NEEDED
Refills: 0 | COMMUNITY
Start: 2018-05-08 | End: 2019-04-02 | Stop reason: SDUPTHER

## 2018-05-14 NOTE — PROGRESS NOTES
Assessment/Plan:      Diagnoses and all orders for this visit:    Urinary urgency    Other orders  -     frovatriptan (FROVA) 2 5 MG tablet; Discussion was had with patient regarding continued plan for management  I have asked that she continue Kegel's, regular exercise, modification of bladder irritants  We did discuss the option of vaginal estrogen therapy if she has exacerbation of her symptoms  Patient would like to proceed with her current plan at this time  She will return to the office in 10 months for her annual visit  Subjective:     Patient ID: Fabian Crane is a 46 y o  female  Patient returns for re-evaluation of urinary urgency  Since she was seen patient has been able to implement daily Kegel's exercises  She is also implemented nutritional change as well as daily yoga  She has avoided bladder irritants in her nutrition  She has seen a dramatic resolution of all of her urinary urgency and frequency  Urinary Frequency    Associated symptoms include frequency  Review of Systems   Genitourinary: Positive for frequency  All other systems reviewed and are negative          Objective:     Physical Exam

## 2018-05-29 ENCOUNTER — OFFICE VISIT (OUTPATIENT)
Dept: NEUROLOGY | Facility: CLINIC | Age: 53
End: 2018-05-29
Payer: COMMERCIAL

## 2018-05-29 ENCOUNTER — TELEPHONE (OUTPATIENT)
Dept: NEUROLOGY | Facility: CLINIC | Age: 53
End: 2018-05-29

## 2018-05-29 VITALS
HEIGHT: 65 IN | SYSTOLIC BLOOD PRESSURE: 100 MMHG | WEIGHT: 149 LBS | DIASTOLIC BLOOD PRESSURE: 60 MMHG | BODY MASS INDEX: 24.83 KG/M2 | HEART RATE: 56 BPM

## 2018-05-29 DIAGNOSIS — G43.709 CHRONIC MIGRAINE WITHOUT AURA WITHOUT STATUS MIGRAINOSUS, NOT INTRACTABLE: Primary | ICD-10-CM

## 2018-05-29 PROCEDURE — 99214 OFFICE O/P EST MOD 30 MIN: CPT | Performed by: PSYCHIATRY & NEUROLOGY

## 2018-05-29 RX ORDER — TOPIRAMATE 25 MG/1
25 CAPSULE, COATED PELLETS ORAL 2 TIMES DAILY
Qty: 60 CAPSULE | Refills: 4 | Status: SHIPPED | OUTPATIENT
Start: 2018-05-29 | End: 2018-09-17 | Stop reason: SDUPTHER

## 2018-05-29 NOTE — PROGRESS NOTES
Patient ID: Fabian Crane is a 46 y o  female  Assessment/Plan: This is a 47 y/o F who is here as a follow up of migraines  She failed amitriptyline previously because she did not notice any relief  She is now on propanolol  She is still having 2-3 a week  She does not have a history of kidney stones  She tried topamax in the past, and it worked well for several years and then stopped working  Her migraines are likely hormonal since she is currently going through menopause  PLAN:  -she failed amitriptyline, and she failed propanolol, she initially had relief and then was not working anymore  -also will not increase dose of propanolol bc heart rate today is 56    -will wean off propanolol over the next couple of days  -will start topamax 25mg in 1 week again which she is willing to, then increase to 50mg the following week and then 75mg the following week    -she does take maxalt PRN for headache  -she is avoid caffeine after 3pm    -she would rather not try to botox at this time  I would like to follow up in 3 months, I would be happy to see her sooner if the need arises  Patient/Guardian was advised to the call the office if they have any questions and concerns in the meantime  Patient/Guardian does understand that if they have any new stroke like symptoms such as facial droop on one side, weakness/paralysis on either side, speech trouble, numbness on one side, balance issues, any vision changes, or any new headache, to call 9-1-1 immediately or to proceed to the nearest ER immediately  Problem List Items Addressed This Visit     Chronic migraine without aura without status migrainosus, not intractable - Primary    Relevant Medications    topiramate (TOPAMAX) 25 mg sprinkle capsule             Subjective:    HPI    This is a 47 y/o Female who is here as a migraine follow up   She says that initially she noticed a difference in her headache frequency and intensity initially with propanolol but then recently over the past 2-3 weeks her headaches are more frequent  She does have relief of headaches with maxalt  She failed topamax in the past after 10+ years but then she said she also failed amitriptyline as well  She does not have any other concerns today except that her headaches are more frequent, on a scale on 1-10, her headache is 4/10  She says that her headache is on the back and top of her head  She says that she is still having 8-10 per month  She says that her headaches last 12-24hrs  Symptoms associated with headache - light sensitivity, noise sensitivity  The following portions of the patient's history were reviewed and updated as appropriate:   She  has a past medical history of Amenorrhea; Anemia; Contact dermatitis; Crohn's disease ([address] ); Degenerative disc disease, cervical; Folliculitis; Glaucoma; Hyperlipidemia; Hypertension; IBS (irritable bowel syndrome); Menorrhagia; Migraine; and Vitamin D deficiency disease  She   Patient Active Problem List    Diagnosis Date Noted    Urinary urgency 2018    Chronic migraine without aura without status migrainosus, not intractable 2018     She  has a past surgical history that includes  section; Dilation and curettage of uterus; pr colonoscopy flx dx w/collj spec when pfrmd (N/A, 9/15/2016); Laser ablation of vascular lesion; and Esophagogastroduodenoscopy  Her family history includes Arthritis in her mother; Hypertension in her father; Lymphoma in her father; Ulcerative colitis in her mother  She  reports that she has never smoked  She has never used smokeless tobacco  She reports that she drinks alcohol  She reports that she does not use drugs    Current Outpatient Prescriptions   Medication Sig Dispense Refill    dicyclomine (BENTYL) 20 mg tablet Take 20 mg by mouth every 6 (six) hours      frovatriptan (FROVA) 2 5 MG tablet   0    latanoprost (XALATAN) 0 005 % ophthalmic solution 1 drop daily at bedtime      propranolol (INDERAL) 40 mg tablet Take 1 tablet (40 mg total) by mouth 3 (three) times a day 90 tablet 4    rizatriptan (MAXALT) 10 MG tablet Take 10 mg by mouth as needed for migraine Indications: Take 1 tablet at onset of migraine  May repeat in 2 hours if needed  Do not exceed 3 tabs in 24 hours   topiramate (TOPAMAX) 25 mg sprinkle capsule Take 1 capsule (25 mg total) by mouth 2 (two) times a day Take 25mg daily for 1 week, and then increase to 50mg next week and then to 75mg the following week 60 capsule 4     No current facility-administered medications for this visit  Current Outpatient Prescriptions on File Prior to Visit   Medication Sig    dicyclomine (BENTYL) 20 mg tablet Take 20 mg by mouth every 6 (six) hours    frovatriptan (FROVA) 2 5 MG tablet     latanoprost (XALATAN) 0 005 % ophthalmic solution 1 drop daily at bedtime    propranolol (INDERAL) 40 mg tablet Take 1 tablet (40 mg total) by mouth 3 (three) times a day    rizatriptan (MAXALT) 10 MG tablet Take 10 mg by mouth as needed for migraine Indications: Take 1 tablet at onset of migraine  May repeat in 2 hours if needed  Do not exceed 3 tabs in 24 hours  No current facility-administered medications on file prior to visit  She is allergic to sulfa antibiotics            Objective:    Blood pressure 100/60, pulse 56, height 5' 5" (1 651 m), weight 67 6 kg (149 lb)  Physical Exam  General - alert, awake, follows commands  Speech - fluent, no dysarthria noted  skin-  no lesions  gait - normal   Neurological Exam      ROS:    Review of Systems   Constitutional: Negative  Negative for appetite change and fever  HENT: Negative  Negative for hearing loss, tinnitus, trouble swallowing and voice change  Eyes: Negative  Negative for photophobia and pain  Respiratory: Negative  Negative for shortness of breath  Cardiovascular: Negative  Negative for palpitations  Gastrointestinal: Negative    Negative for nausea and vomiting  Endocrine: Negative  Negative for cold intolerance and heat intolerance  Genitourinary: Negative  Negative for dysuria, frequency and urgency  Musculoskeletal: Negative  Negative for myalgias and neck pain  Skin: Negative  Negative for rash  Neurological: Positive for headaches  Negative for dizziness, tremors, seizures, syncope, facial asymmetry, speech difficulty, weakness, light-headedness and numbness  Hematological: Negative  Does not bruise/bleed easily  Psychiatric/Behavioral: Negative  Negative for confusion, hallucinations and sleep disturbance

## 2018-07-11 DIAGNOSIS — K21.9 GASTROESOPHAGEAL REFLUX DISEASE WITHOUT ESOPHAGITIS: Primary | ICD-10-CM

## 2018-07-12 PROBLEM — K21.9 GASTROESOPHAGEAL REFLUX DISEASE WITHOUT ESOPHAGITIS: Status: ACTIVE | Noted: 2017-05-31

## 2018-07-12 RX ORDER — FAMOTIDINE 20 MG/1
TABLET, FILM COATED ORAL
Qty: 60 TABLET | Refills: 2 | Status: SHIPPED | OUTPATIENT
Start: 2018-07-12 | End: 2019-01-10 | Stop reason: SDUPTHER

## 2018-09-07 ENCOUNTER — OFFICE VISIT (OUTPATIENT)
Dept: FAMILY MEDICINE CLINIC | Facility: CLINIC | Age: 53
End: 2018-09-07
Payer: COMMERCIAL

## 2018-09-07 ENCOUNTER — APPOINTMENT (OUTPATIENT)
Dept: RADIOLOGY | Facility: MEDICAL CENTER | Age: 53
End: 2018-09-07
Payer: COMMERCIAL

## 2018-09-07 VITALS
SYSTOLIC BLOOD PRESSURE: 118 MMHG | DIASTOLIC BLOOD PRESSURE: 78 MMHG | HEIGHT: 66 IN | BODY MASS INDEX: 23.91 KG/M2 | WEIGHT: 148.8 LBS

## 2018-09-07 DIAGNOSIS — M54.2 NECK PAIN ON LEFT SIDE: Primary | ICD-10-CM

## 2018-09-07 DIAGNOSIS — M54.2 NECK PAIN ON LEFT SIDE: ICD-10-CM

## 2018-09-07 DIAGNOSIS — M25.512 LEFT SHOULDER PAIN, UNSPECIFIED CHRONICITY: ICD-10-CM

## 2018-09-07 DIAGNOSIS — M79.602 ARM PAIN, LEFT: ICD-10-CM

## 2018-09-07 PROCEDURE — 3008F BODY MASS INDEX DOCD: CPT | Performed by: FAMILY MEDICINE

## 2018-09-07 PROCEDURE — 73030 X-RAY EXAM OF SHOULDER: CPT

## 2018-09-07 PROCEDURE — 99213 OFFICE O/P EST LOW 20 MIN: CPT | Performed by: FAMILY MEDICINE

## 2018-09-07 PROCEDURE — 72040 X-RAY EXAM NECK SPINE 2-3 VW: CPT

## 2018-09-07 RX ORDER — PREDNISONE 10 MG/1
TABLET ORAL
Qty: 21 TABLET | Refills: 0 | Status: SHIPPED | OUTPATIENT
Start: 2018-09-07 | End: 2018-12-13 | Stop reason: ALTCHOICE

## 2018-09-07 NOTE — PROGRESS NOTES
Assessment/Plan:  Chief Complaint   Patient presents with    Back Pain     pt states pain to left arm  Started in mid july  Patient Instructions   Chronic left neck and left shoulder and left lateral upper arm pain  Rec  xrays and prednisone as directed for pain  Rest left arm  Consult [facility] for left arm and left shoulder and left neck pain  No problem-specific Assessment & Plan notes found for this encounter  Diagnoses and all orders for this visit:    Neck pain on left side  -     XR spine cervical 2 or 3 vw injury; Future  -     predniSONE 10 mg tablet; Take 60 mg po day#1, 50 mg po day#2, 40 mg po day#3, 30 mg po day#4, 20 mg po day#5m and 10 mg po day#6    Left shoulder pain, unspecified chronicity  -     XR shoulder 2+ vw left; Future  -     predniSONE 10 mg tablet; Take 60 mg po day#1, 50 mg po day#2, 40 mg po day#3, 30 mg po day#4, 20 mg po day#5m and 10 mg po day#6    Arm pain, left  -     XR shoulder 2+ vw left; Future  -     predniSONE 10 mg tablet; Take 60 mg po day#1, 50 mg po day#2, 40 mg po day#3, 30 mg po day#4, 20 mg po day#5m and 10 mg po day#6          Subjective:      Patient ID: Sreekanth Brannon is a 46 y o  female  Back Pain (pt states pain to left arm  Started in mid july  ) Saw a chiropractor and he thinks it is a bursitis in left shoulder  No cp or sob, and used motrin and ice and heat and it has not helped  Back Pain         The following portions of the patient's history were reviewed and updated as appropriate: allergies, current medications, past family history, past medical history, past social history, past surgical history and problem list     Review of Systems   Constitutional: Negative  HENT: Negative  Eyes: Negative  Respiratory: Negative  Cardiovascular: Negative  Gastrointestinal: Negative  Endocrine: Negative  Genitourinary: Negative  Musculoskeletal: Positive for back pain          Pain in left neck down into left shoulder to left upper arm   Skin: Negative  Allergic/Immunologic: Negative  Neurological: Negative  Hematological: Negative  Psychiatric/Behavioral: Negative  Objective:      /78   Ht 5' 6" (1 676 m)   Wt 67 5 kg (148 lb 12 8 oz)   BMI 24 02 kg/m²          Physical Exam   Constitutional: She is oriented to person, place, and time  She appears well-developed and well-nourished  HENT:   Head: Normocephalic and atraumatic  Right Ear: External ear normal    Left Ear: External ear normal    Nose: Nose normal    Mouth/Throat: Oropharynx is clear and moist    Eyes: Conjunctivae and EOM are normal  Pupils are equal, round, and reactive to light  Neck: Normal range of motion  Neck supple  Cardiovascular: Normal rate, regular rhythm, normal heart sounds and intact distal pulses  Pulmonary/Chest: Effort normal and breath sounds normal    Musculoskeletal: Normal range of motion  Neurological: She is alert and oriented to person, place, and time  She has normal reflexes  Skin: Skin is warm and dry  Psychiatric: She has a normal mood and affect   Her behavior is normal

## 2018-09-07 NOTE — PATIENT INSTRUCTIONS
Chronic left neck and left shoulder and left lateral upper arm pain  Rec  xrays and prednisone as directed for pain  Rest left arm  Consult [facility] for left arm and left shoulder and left neck pain

## 2018-09-17 DIAGNOSIS — G43.909 MIGRAINE WITHOUT STATUS MIGRAINOSUS, NOT INTRACTABLE, UNSPECIFIED MIGRAINE TYPE: Primary | ICD-10-CM

## 2018-09-17 DIAGNOSIS — G43.709 CHRONIC MIGRAINE WITHOUT AURA WITHOUT STATUS MIGRAINOSUS, NOT INTRACTABLE: ICD-10-CM

## 2018-09-17 RX ORDER — TOPIRAMATE 25 MG/1
75 CAPSULE, COATED PELLETS ORAL
Qty: 90 CAPSULE | Refills: 5 | Status: SHIPPED | OUTPATIENT
Start: 2018-09-17 | End: 2018-10-02 | Stop reason: SDUPTHER

## 2018-09-17 RX ORDER — TOPIRAMATE 25 MG/1
TABLET ORAL
Qty: 120 TABLET | Refills: 0 | Status: SHIPPED | OUTPATIENT
Start: 2018-09-17 | End: 2018-10-02 | Stop reason: CLARIF

## 2018-09-27 ENCOUNTER — TELEPHONE (OUTPATIENT)
Dept: NEUROLOGY | Facility: CLINIC | Age: 53
End: 2018-09-27

## 2018-09-27 NOTE — TELEPHONE ENCOUNTER
Called pt [facility]  Pt has an appointment with Dr Hidalgo on 10/1 that needs to be r/s  If pt calls back please r/s next available

## 2018-10-02 ENCOUNTER — OFFICE VISIT (OUTPATIENT)
Dept: NEUROLOGY | Facility: CLINIC | Age: 53
End: 2018-10-02
Payer: COMMERCIAL

## 2018-10-02 VITALS
BODY MASS INDEX: 24.05 KG/M2 | SYSTOLIC BLOOD PRESSURE: 108 MMHG | DIASTOLIC BLOOD PRESSURE: 80 MMHG | WEIGHT: 149 LBS | HEART RATE: 78 BPM

## 2018-10-02 DIAGNOSIS — G43.709 CHRONIC MIGRAINE WITHOUT AURA WITHOUT STATUS MIGRAINOSUS, NOT INTRACTABLE: Primary | ICD-10-CM

## 2018-10-02 PROCEDURE — 99214 OFFICE O/P EST MOD 30 MIN: CPT | Performed by: PSYCHIATRY & NEUROLOGY

## 2018-10-02 RX ORDER — RIZATRIPTAN BENZOATE 10 MG/1
10 TABLET ORAL AS NEEDED
Qty: 9 TABLET | Refills: 1 | Status: SHIPPED | OUTPATIENT
Start: 2018-10-02 | End: 2018-12-13

## 2018-10-02 RX ORDER — TOPIRAMATE 25 MG/1
75 CAPSULE, COATED PELLETS ORAL
Qty: 90 CAPSULE | Refills: 3 | Status: SHIPPED | OUTPATIENT
Start: 2018-10-02 | End: 2019-02-18 | Stop reason: SDUPTHER

## 2018-10-02 NOTE — PROGRESS NOTES
Patient ID: Parviz Worrell is a 46 y o  female  Assessment/Plan: This is a 47 y/o Female who is here for a follow up of migraines  She has failed amitriptyline and propanolol in the past  She has been seeing physical therapy for trigger point injections  She says that they are not migraines  Neurologic examination is non-focal, no motor or sensory deficits  PLAN:  -Continue with prophylactic medication with topamax 75mg PO qHS, she has been keeping herself hydrated  -Continue Abortive therapy with maxalt, sent refills to the pharmacy  -Avoid using analgesics no more than 3 times a week  -continue with physical therapy for now    I would like to follow up in 5-6 months, I would be happy to see the patient sooner if the need arises  Patient/Guardian was advised to the call the office if they have any questions and concerns in the meantime  Patient/Guardian does understand that if they have any new stroke like symptoms such as facial droop on one side, weakness/paralysis on either side, speech trouble, numbness on one side, balance issues, any vision changes, or any new headache, to call 9-1-1 immediately or to proceed to the nearest ER immediately  Problem List Items Addressed This Visit     Chronic migraine without aura without status migrainosus, not intractable - Primary    Relevant Medications    rizatriptan (MAXALT) 10 MG tablet    topiramate (TOPAMAX) 25 mg sprinkle capsule             Subjective:    HPI    A 59-year-old female who is here as a follow-up of migraine headaches  She failed amitriptyline previously be should not really notice any relief  She also initially had relief of propanolol,  Initially had early foot not working anymore  Then she was started on Topamax 25 mg and increase slowly to 75 mg over few weeks  She has been seeing physical therapy for trigger point injections which have been helping her          The following portions of the patient's history were reviewed and updated as appropriate:   She  has a past medical history of Amenorrhea; Anemia; Contact dermatitis; Crohn's disease ([address] ); Degenerative disc disease, cervical; Folliculitis; Glaucoma; Hyperlipidemia; Hypertension; IBS (irritable bowel syndrome); Menorrhagia; Migraine; and Vitamin D deficiency disease  She   Patient Active Problem List    Diagnosis Date Noted    Urinary urgency 2018    Chronic migraine without aura without status migrainosus, not intractable 2018    Gastroesophageal reflux disease without esophagitis 2017     She  has a past surgical history that includes  section; Dilation and curettage of uterus; pr colonoscopy flx dx w/collj spec when pfrmd (N/A, 9/15/2016); Laser ablation of vascular lesion; and Esophagogastroduodenoscopy  Her family history includes Arthritis in her mother; Hypertension in her father; Lymphoma in her father; Ulcerative colitis in her mother  She  reports that she has never smoked  She has never used smokeless tobacco  She reports that she drinks alcohol  She reports that she does not use drugs    Current Outpatient Prescriptions   Medication Sig Dispense Refill    dicyclomine (BENTYL) 20 mg tablet Take 20 mg by mouth every 6 (six) hours      famotidine (PEPCID) 20 mg tablet TAKE ONE TABLET BY MOUTH TWICE DAILY  60 tablet 2    frovatriptan (FROVA) 2 5 MG tablet   0    latanoprost (XALATAN) 0 005 % ophthalmic solution 1 drop daily at bedtime      predniSONE 10 mg tablet Take 60 mg po day#1, 50 mg po day#2, 40 mg po day#3, 30 mg po day#4, 20 mg po day#5m and 10 mg po day#6 21 tablet 0    propranolol (INDERAL) 40 mg tablet Take 1 tablet (40 mg total) by mouth 3 (three) times a day 90 tablet 4    rizatriptan (MAXALT) 10 MG tablet Take 1 tablet (10 mg total) by mouth as needed for migraine 9 tablet 1    topiramate (TOPAMAX) 25 mg sprinkle capsule Take 3 capsules (75 mg total) by mouth daily at bedtime 90 capsule 3     No current facility-administered medications for this visit  Current Outpatient Prescriptions on File Prior to Visit   Medication Sig    dicyclomine (BENTYL) 20 mg tablet Take 20 mg by mouth every 6 (six) hours    famotidine (PEPCID) 20 mg tablet TAKE ONE TABLET BY MOUTH TWICE DAILY     frovatriptan (FROVA) 2 5 MG tablet     latanoprost (XALATAN) 0 005 % ophthalmic solution 1 drop daily at bedtime    predniSONE 10 mg tablet Take 60 mg po day#1, 50 mg po day#2, 40 mg po day#3, 30 mg po day#4, 20 mg po day#5m and 10 mg po day#6    propranolol (INDERAL) 40 mg tablet Take 1 tablet (40 mg total) by mouth 3 (three) times a day    [DISCONTINUED] rizatriptan (MAXALT) 10 MG tablet Take 10 mg by mouth as needed for migraine Indications: Take 1 tablet at onset of migraine  May repeat in 2 hours if needed  Do not exceed 3 tabs in 24 hours   [DISCONTINUED] topiramate (TOPAMAX) 25 mg sprinkle capsule Take 3 capsules (75 mg total) by mouth daily at bedtime    [DISCONTINUED] topiramate (TOPAMAX) 25 mg tablet TAKE FOUR TABLETS BY MOUTH DAILY      No current facility-administered medications on file prior to visit  She is allergic to sulfa antibiotics            Objective:    Blood pressure 108/80, pulse 78, weight 67 6 kg (149 lb)  Physical Exam  General - alert, awake, follows commands  Speech - fluent, no dysarthria noted, no aphasia noted  skin -  no lesions  Cranial nerves: PERRL, EOMI, no facial droop noted, facial sensation intact, tongue midline  Motor 5/5 throughout  Coordination - no ataxia/dysmetria noted  gait - normal  Neurological Exam      ROS:    Review of Systems   Constitutional: Negative  Negative for appetite change and fever  HENT: Negative  Negative for hearing loss, tinnitus, trouble swallowing and voice change  Eyes: Negative  Negative for photophobia and pain  Respiratory: Negative  Negative for shortness of breath  Cardiovascular: Negative  Negative for palpitations  Gastrointestinal: Negative  Negative for nausea and vomiting  Endocrine: Negative  Negative for cold intolerance and heat intolerance  Genitourinary: Negative  Negative for dysuria, frequency and urgency  Musculoskeletal: Positive for neck pain  Negative for myalgias  Skin: Negative  Negative for rash  Neurological: Negative  Negative for dizziness, tremors, seizures, syncope, facial asymmetry, speech difficulty, weakness, light-headedness, numbness and headaches  Hematological: Negative  Does not bruise/bleed easily  Psychiatric/Behavioral: Negative  Negative for confusion, hallucinations and sleep disturbance

## 2018-11-01 DIAGNOSIS — E78.5 HYPERLIPIDEMIA: ICD-10-CM

## 2018-11-12 LAB
ALBUMIN SERPL-MCNC: 5 G/DL (ref 3.5–5.5)
ALBUMIN/GLOB SERPL: 2.5 {RATIO} (ref 1.2–2.2)
ALP SERPL-CCNC: 54 IU/L (ref 39–117)
ALT SERPL-CCNC: 15 IU/L (ref 0–32)
AST SERPL-CCNC: 13 IU/L (ref 0–40)
BILIRUB SERPL-MCNC: 0.3 MG/DL (ref 0–1.2)
BUN SERPL-MCNC: 25 MG/DL (ref 6–24)
BUN/CREAT SERPL: 34 (ref 9–23)
CALCIUM SERPL-MCNC: 9.5 MG/DL (ref 8.7–10.2)
CHLORIDE SERPL-SCNC: 108 MMOL/L (ref 96–106)
CHOLEST SERPL-MCNC: 226 MG/DL (ref 100–199)
CO2 SERPL-SCNC: 22 MMOL/L (ref 20–29)
CREAT SERPL-MCNC: 0.73 MG/DL (ref 0.57–1)
ERYTHROCYTE [DISTWIDTH] IN BLOOD BY AUTOMATED COUNT: 13.8 % (ref 12.3–15.4)
GLOBULIN SER-MCNC: 2 G/DL (ref 1.5–4.5)
GLUCOSE SERPL-MCNC: 88 MG/DL (ref 65–99)
HCT VFR BLD AUTO: 39.1 % (ref 34–46.6)
HDLC SERPL-MCNC: 69 MG/DL
HGB BLD-MCNC: 12.9 G/DL (ref 11.1–15.9)
LABCORP COMMENT: NORMAL
LDLC SERPL CALC-MCNC: 141 MG/DL (ref 0–99)
MCH RBC QN AUTO: 28.9 PG (ref 26.6–33)
MCHC RBC AUTO-ENTMCNC: 33 G/DL (ref 31.5–35.7)
MCV RBC AUTO: 88 FL (ref 79–97)
PLATELET # BLD AUTO: 247 X10E3/UL (ref 150–379)
POTASSIUM SERPL-SCNC: 5.4 MMOL/L (ref 3.5–5.2)
PROT SERPL-MCNC: 7 G/DL (ref 6–8.5)
RBC # BLD AUTO: 4.46 X10E6/UL (ref 3.77–5.28)
SL AMB EGFR AFRICAN AMERICAN: 109 ML/MIN/1.73
SL AMB EGFR NON AFRICAN AMERICAN: 94 ML/MIN/1.73
SL AMB VLDL CHOLESTEROL CALC: 16 MG/DL (ref 5–40)
SODIUM SERPL-SCNC: 143 MMOL/L (ref 134–144)
TRIGL SERPL-MCNC: 82 MG/DL (ref 0–149)
TSH SERPL DL<=0.005 MIU/L-ACNC: 1.01 UIU/ML (ref 0.45–4.5)
WBC # BLD AUTO: 5.8 X10E3/UL (ref 3.4–10.8)

## 2018-12-13 ENCOUNTER — OFFICE VISIT (OUTPATIENT)
Dept: FAMILY MEDICINE CLINIC | Facility: CLINIC | Age: 53
End: 2018-12-13
Payer: COMMERCIAL

## 2018-12-13 VITALS
SYSTOLIC BLOOD PRESSURE: 110 MMHG | WEIGHT: 145 LBS | BODY MASS INDEX: 23.3 KG/M2 | DIASTOLIC BLOOD PRESSURE: 80 MMHG | HEIGHT: 66 IN

## 2018-12-13 DIAGNOSIS — G43.709 CHRONIC MIGRAINE WITHOUT AURA WITHOUT STATUS MIGRAINOSUS, NOT INTRACTABLE: ICD-10-CM

## 2018-12-13 DIAGNOSIS — R41.3 MEMORY CHANGES: ICD-10-CM

## 2018-12-13 DIAGNOSIS — K58.2 IRRITABLE BOWEL SYNDROME WITH BOTH CONSTIPATION AND DIARRHEA: ICD-10-CM

## 2018-12-13 DIAGNOSIS — K21.9 GASTROESOPHAGEAL REFLUX DISEASE WITHOUT ESOPHAGITIS: ICD-10-CM

## 2018-12-13 DIAGNOSIS — E78.2 MIXED HYPERLIPIDEMIA: Primary | ICD-10-CM

## 2018-12-13 DIAGNOSIS — E87.5 HYPERKALEMIA: ICD-10-CM

## 2018-12-13 DIAGNOSIS — E55.9 VITAMIN D DEFICIENCY: ICD-10-CM

## 2018-12-13 DIAGNOSIS — I10 BENIGN ESSENTIAL HYPERTENSION: ICD-10-CM

## 2018-12-13 PROBLEM — R92.2 DENSE BREAST TISSUE: Status: ACTIVE | Noted: 2018-01-02

## 2018-12-13 PROBLEM — R92.30 DENSE BREAST TISSUE: Status: ACTIVE | Noted: 2018-01-02

## 2018-12-13 PROCEDURE — 3079F DIAST BP 80-89 MM HG: CPT | Performed by: NURSE PRACTITIONER

## 2018-12-13 PROCEDURE — 3725F SCREEN DEPRESSION PERFORMED: CPT | Performed by: NURSE PRACTITIONER

## 2018-12-13 PROCEDURE — 1036F TOBACCO NON-USER: CPT | Performed by: NURSE PRACTITIONER

## 2018-12-13 PROCEDURE — 99214 OFFICE O/P EST MOD 30 MIN: CPT | Performed by: NURSE PRACTITIONER

## 2018-12-13 PROCEDURE — 3074F SYST BP LT 130 MM HG: CPT | Performed by: NURSE PRACTITIONER

## 2018-12-13 PROCEDURE — 3008F BODY MASS INDEX DOCD: CPT | Performed by: NURSE PRACTITIONER

## 2018-12-13 RX ORDER — CYCLOBENZAPRINE HCL 10 MG
TABLET ORAL
COMMUNITY
End: 2018-12-13 | Stop reason: ALTCHOICE

## 2018-12-13 NOTE — ASSESSMENT & PLAN NOTE
On Topamax for prophylactic therapy and Frova for abortive therapy  Frequency of migraines vary, seeing neurology every 3-4 months

## 2018-12-13 NOTE — ASSESSMENT & PLAN NOTE
Will update BMP  Mildly elevated  Her daughter's potassium currently elevated as well  Will recheck and then further evaluate

## 2018-12-13 NOTE — PATIENT INSTRUCTIONS
Complete blood work  We will call with results  Recheck Lipid panel in 3-4 months  Please make dietary changes for low cholesterol diet  Call us if you experience any worsening symptoms or no improvement  Cholesterol and Your Health   AMBULATORY CARE:   Cholesterol  is a waxy, fat-like substance  Cholesterol is made by your body, but also comes from certain foods you eat  Your body uses cholesterol to make hormones and new cells  Your body also uses cholesterol to protect nerves  Cholesterol comes from foods such as meat and dairy products  Your total cholesterol level is made up by LDL cholesterol, HDL cholesterol, and triglycerides:  · LDL cholesterol  is called bad cholesterol  because it forms plaque in your arteries  As plaque builds up, your arteries become narrow, and less blood flows through  When plaque decreases blood flow to your heart, you may have chest pain  If plaque completely blocks an artery that bring blood to your heart, you may have a heart attack  Plaque can break off and form blood clots  Blood clots may block arteries in your brain and cause a stroke  · HDL cholesterol  is called good cholesterol  because it helps remove LDL cholesterol from your arteries  It does this by attaching to LDL cholesterol and carrying it to your liver  Your liver breaks down LDL cholesterol so your body can get rid of it  High levels of HDL cholesterol can help prevent a heart attack and stroke  Low levels of HDL cholesterol can increase your risk for heart disease, heart attack, and stroke  · Triglycerides  are a type of fat that store energy from foods you eat  High levels of triglycerides also cause plaque buildup  This can increase your risk for a heart attack or stroke  If your triglyceride level is high, your LDL cholesterol level may also be high  How food affects your cholesterol levels:   · Unhealthy fats  increase LDL cholesterol and triglyceride levels in your blood   They are found in foods high in cholesterol, saturated fat, and trans fat:     ¨ Cholesterol  is found in eggs, dairy, and meat  ¨ Saturated fat  is found in butter, cheese, ice cream, whole milk, and coconut oil  Saturated fat is also found in meat, such as sausage, hot dogs, and bologna  ¨ Trans fat  is found in liquid oils and is used in fried and baked foods  Foods that contain trans fats include chips, crackers, muffins, sweet rolls, microwave popcorn, and cookies  · Healthy fats,  also called unsaturated fats, help lower LDL cholesterol and triglyceride levels  Healthy fats include the following:     ¨ Monounsaturated fats  are found in foods such as olive oil, canola oil, avocado, nuts, and olives  ¨ Polyunsaturated fats,  such as omega 3 fats, are found in fish, such as salmon, trout, and tuna  They can also be found in plant foods such as flaxseed, walnuts, and soybeans  Other things that affect your cholesterol levels:   · Smoking cigarettes    · Being overweight or obese     · Drinking large amounts of alcohol    · Not enough exercise or no exercise    · Certain genes passed from your parents to you  What you need to know about having your cholesterol levels checked: Adults 21to 39years of age should have their cholesterol levels checked every 4 to 6 years  Adults 45 years and older should have their cholesterol checked every 1 to 2 years  You may need your cholesterol checked more often, or at a younger age, if you have risk factors for heart disease  You may also need to have your cholesterol checked more often if you have other health conditions, such as diabetes  Blood tests are used to check cholesterol levels  Blood tests measure your levels of triglycerides, LDL cholesterol, and HDL cholesterol  Cholesterol level goals: Your cholesterol level goal may depend on your risk for heart disease  It may also depend on your age and other health conditions   Ask your healthcare provider if the following goals are right for you:  · Your total cholesterol level  should be less than 200 mg/dL  This number may also depend on your HDL and LDL cholesterol goals  · Your LDL cholesterol level  should be less than 130 mg/dL  · Your HDL cholesterol level  should be 60 mg/dL or higher  · Your triglyceride level  should be less than 150 mg/dL  Treatment for high cholesterol:  Treatment for high cholesterol will also decrease your risk of heart disease, heart attack, and stroke  Treatment may include any of the following:  · Medicines  may be given to lower your LDL cholesterol, triglyceride levels, or total cholesterol level  You may need medicines to lower your cholesterol if any of the following is true:     ¨ You have a history of stroke, TIA, unstable angina, or a heart attack    ¨ Your LDL cholesterol level is 190 mg/dL or higher    ¨ You are age 36to 76years of age, have diabetes, and your LDL cholesterol is 70 mg/dL or higher    ¨ You are age 36to 76years of age, have risk factors for heart disease, and your LDL cholesterol is 70 mg/dL or higher    · Lifestyle changes  include changes to your diet, exercise, weight loss, and quitting smoking  It also includes decreasing the amount of alcohol you drink  · Supplements  include fish oil, red yeast rice, and garlic  Fish oil may help lower your triglyceride and LDL cholesterol levels  It may also increase your HDL cholesterol level  Red yeast rice may help decrease your total cholesterol level and LDL cholesterol level  Garlic may help lower your total cholesterol level  Do not take these supplements without talking to your healthcare provider  Nutrition to help lower your cholesterol levels:  A registered dietitian can help you create a healthy eating plan  Read food labels and choose foods low in saturated fat, trans fats, and cholesterol  · Decrease the total amount of fat you eat    Choose lean meats, fat-free or 1% fat milk, and low-fat dairy products, such as yogurt and cheese  Try to limit or avoid red meats  Limit or do not eat fried foods or baked goods such as cookies  · Replace unhealthy fats with healthy fats  Cook foods in olive oil or canola oil  Choose soft margarines that are low in saturated fat and trans fat  Seeds, nuts, and avocados are other examples of healthy fats  · Eat foods with omega-3 fats  Examples include salmon, tuna, mackerel, walnuts, and flaxseed  Eat fish 2 times per week  Children and pregnant women should not eat fish that have high levels of mercury, such as shark, swordfish, and melvin mackerel  · Increase the amount of plant-based foods you eat  Plant-based foods are low in cholesterol and fat  Eating more of these foods may help lower your cholesterol and help you lose weight  Examples of plant-based foods includes fruits, vegetables, legumes, and whole grains  Replace milk that contains dairy with almond, soy, or coconut milk  Eat beans and foods with soy for protein instead of meat  Ask your healthcare provider or dietitian for more information on plant-based foods  · Increase the amount of fiber you eat  High-fiber foods can help lower your LDL cholesterol  You should eat between 20 and 30 grams of fiber each day  Eat at least 5 servings of fruits and vegetables each day  Other examples of high-fiber foods include whole-grain or whole-wheat breads, pastas, or cereals, and brown rice  Eat 3 ounces of whole-grain foods each day  Increase fiber slowly  You may have abdominal discomfort, bloating, and gas if you add fiber to your diet too quickly  Lifestyle changes you can make to help lower your cholesterol levels:   · Maintain a healthy weight  Ask your healthcare provider how much you should weigh  Ask him or her to help you create a weight loss plan if you are overweight  Weight loss can decrease your total cholesterol and triglyceride levels  · Exercise regularly    Exercise can help lower your total cholesterol level and maintain a healthy weight  Exercise can also help increase your HDL cholesterol level  Work with your healthcare provider to create an exercise program that is right for you  Get at least 30 minutes of moderate exercise most days of the week  Examples of exercise include brisk walking, swimming, or biking  · Do not smoke  Nicotine and other chemicals in cigarettes and cigars can damage your lungs, heart, and blood vessels  They can also raise your triglyceride levels  Ask your healthcare provider for information if you currently smoke and need help to quit  E-cigarettes or smokeless tobacco still contain nicotine  Talk to your healthcare provider before you use these products  · Limit or do not drink alcohol  Alcohol can increase your triglyceride levels  Ask your healthcare provider if it is safe for you to drink alcohol  Also ask how much is safe for you to drink each day  © 2017 [employer] Information is for End User's use only and may not be sold, redistributed or otherwise used for commercial purposes  All illustrations and images included in CareNotes® are the copyrighted property of [employer]  or [employer]  The above information is an  only  It is not intended as medical advice for individual conditions or treatments  Talk to your doctor, nurse or pharmacist before following any medical regimen to see if it is safe and effective for you  Heart Healthy Diet   AMBULATORY CARE:   A heart healthy diet  is an eating plan low in total fat, unhealthy fats, and sodium (salt)  A heart healthy diet helps decrease your risk for heart disease and stroke  Limit the amount of fat you eat to 25% to 35% of your total daily calories  Limit sodium to less than 2,300 mg each day  Healthy fats:  Healthy fats can help improve cholesterol levels   The risk for heart disease is decreased when cholesterol levels are normal  Choose healthy fats, such as the following:  · Unsaturated fat  is found in foods such as soybean, canola, olive, corn, and safflower oils  It is also found in soft tub margarine that is made with liquid vegetable oil  · Omega-3 fat  is found in certain fish, such as salmon, tuna, and trout, and in walnuts and flaxseed  Unhealthy fats:  Unhealthy fats can cause unhealthy cholesterol levels in your blood and increase your risk of heart disease  Limit unhealthy fats, such as the following:  · Cholesterol  is found in animal foods, such as eggs and lobster, and in dairy products made from whole milk  Limit cholesterol to less than 300 milligrams (mg) each day  You may need to limit cholesterol to 200 mg each day if you have heart disease  · Saturated fat  is found in meats, such as cole and hamburger  It is also found in chicken or turkey skin, whole milk, and butter  Limit saturated fat to less than 7% of your total daily calories  Limit saturated fat to less than 6% if you have heart disease or are at increased risk for it  · Trans fat  is found in packaged foods, such as potato chips and cookies  It is also in hard margarine, some fried foods, and shortening  Avoid trans fats as much as possible    Heart healthy foods and drinks to include:  Ask your dietitian or healthcare provider how many servings to have from each of the following food groups:  · Grains:      ¨ Whole-wheat breads, cereals, and pastas, and brown rice    ¨ Low-fat, low-sodium crackers and chips    · Vegetables:      ¨ Broccoli, green beans, green peas, and spinach    ¨ Collards, kale, and lima beans    ¨ Carrots, sweet potatoes, tomatoes, and peppers    ¨ Canned vegetables with no salt added    · Fruits:      ¨ Bananas, peaches, pears, and pineapple    ¨ Grapes, raisins, and dates    ¨ Oranges, tangerines, grapefruit, orange juice, and grapefruit juice    ¨ Apricots, mangoes, melons, and papaya    ¨ Raspberries and strawberries    ¨ Canned fruit with no added sugar    · Low-fat dairy products:      ¨ Nonfat (skim) milk, 1% milk, and low-fat almond, cashew, or soy milks fortified with calcium    ¨ Low-fat cheese, regular or frozen yogurt, and cottage cheese    · Meats and proteins , such as lean cuts of beef and pork (loin, leg, round), skinless chicken and turkey, legumes, soy products, egg whites, and nuts  Foods and drinks to limit or avoid:  Ask your dietitian or healthcare provider about these and other foods that are high in unhealthy fat, sodium, and sugar:  · Snack or packaged foods , such as frozen dinners, cookies, macaroni and cheese, and cereals with more than 300 mg of sodium per serving    · Canned or dry mixes  for cakes, soups, sauces, or gravies    · Vegetables with added sodium , such as instant potatoes, vegetables with added sauces, or regular canned vegetables    · Other foods high in sodium , such as ketchup, barbecue sauce, salad dressing, pickles, olives, soy sauce, and miso    · High-fat dairy foods  such as whole or 2% milk, cream cheese, or sour cream, and cheeses     · High-fat protein foods  such as high-fat cuts of beef (T-bone steaks, ribs), chicken or turkey with skin, and organ meats, such as liver    · Cured or smoked meats , such as hot dogs, cole, and sausage    · Unhealthy fats and oils , such as butter, stick margarine, shortening, and cooking oils such as coconut or palm oil    · Food and drinks high in sugar , such as soft drinks (soda), sports drinks, sweetened tea, candy, cake, cookies, pies, and doughnuts  Other diet guidelines to follow:   · Eat more foods containing omega-3 fats  Eat fish high in omega-3 fats at least 2 times a week  · Limit alcohol  Too much alcohol can damage your heart and raise your blood pressure  Women should limit alcohol to 1 drink a day  Men should limit alcohol to 2 drinks a day   A drink of alcohol is 12 ounces of beer, 5 ounces of wine, or 1½ ounces of liquor  · Choose low-sodium foods  High-sodium foods can lead to high blood pressure  Add little or no salt to food you prepare  Use herbs and spices in place of salt  · Eat more fiber  to help lower cholesterol levels  Eat at least 5 servings of fruits and vegetables each day  Eat 3 ounces of whole-grain foods each day  Legumes (beans) are also a good source of fiber  Lifestyle guidelines:   · Do not smoke  Nicotine and other chemicals in cigarettes and cigars can cause lung and heart damage  Ask your healthcare provider for information if you currently smoke and need help to quit  E-cigarettes or smokeless tobacco still contain nicotine  Talk to your healthcare provider before you use these products  · Exercise regularly  to help you maintain a healthy weight and improve your blood pressure and cholesterol levels  Ask your healthcare provider about the best exercise plan for you  Do not start an exercise program without asking your healthcare provider  Follow up with your healthcare provider as directed:  Write down your questions so you remember to ask them during your visits  © 2017 [address] [facility] Information is for End User's use only and may not be sold, redistributed or otherwise used for commercial purposes  All illustrations and images included in CareNotes® are the copyrighted property of [employer]  or [employer]  The above information is an  only  It is not intended as medical advice for individual conditions or treatments  Talk to your doctor, nurse or pharmacist before following any medical regimen to see if it is safe and effective for you  Hyperkalemia   WHAT YOU NEED TO KNOW:   What is hyperkalemia? Hyperkalemia is a high level of potassium in your blood  Potassium helps control how your muscles, heart, and digestive system work  What causes hyperkalemia?    · Intense and prolonged exercise    · Medical conditions, such as diabetes, HIV, tuberculosis, or kidney disease    · Medicines, such as pain medicine and heart or blood pressure medicine    · A diet that is high in potassium    · Trauma, such as muscle injury, burns, or surgery  What are the signs and symptoms of hyperkalemia? · Muscle cramps or pain    · Diarrhea    · Abdominal pain    · Numbness or weakness  How is hyperkalemia diagnosed? · An EKG  test records your heart rhythm and how fast your heart beats  It is used to check for irregular heartbeats  · Blood tests  are done to check your potassium level  How is hyperkalemia treated? · Medicines  will be given to remove potassium from your body  This will lower your potassium levels  This medicine may be given as a pill or an enema  · Dialysis  may be needed if other treatments do not work  Dialysis uses a machine to remove waste products and toxins from your blood  Ask your healthcare provider for more information about dialysis  How can I manage my symptoms? Limit the amount of potassium you eat  Foods that are high in potassium include bananas, tomatoes, oranges, turkey, and milk  Orange juice, citrus juices, and tomato juice are also high in potassium  Do not use salt substitutes  You may need to meet with a dietitian to help plan the best meals for you  When should I contact my healthcare provider? · You have nausea or are vomiting  · You have numbness or tingling in your arms or legs  · Your symptoms do not go away or get worse  · You have questions or concerns about your condition or care  When should I seek immediate care or call 911? · You have trouble breathing  · You have an irregular heartbeat  · You have trouble controlling your muscles  · You are too tired or weak to stand up  CARE AGREEMENT:   You have the right to help plan your care  Learn about your health condition and how it may be treated   Discuss treatment options with your caregivers to decide what care you want to receive  You always have the right to refuse treatment  The above information is an  only  It is not intended as medical advice for individual conditions or treatments  Talk to your doctor, nurse or pharmacist before following any medical regimen to see if it is safe and effective for you  © 2017 [address] [employer] Information is for End User's use only and may not be sold, redistributed or otherwise used for commercial purposes  All illustrations and images included in CareNotes® are the copyrighted property of [employer]  or [employer]

## 2018-12-13 NOTE — PROGRESS NOTES
Assessment/Plan:    Chronic migraine without aura without status migrainosus, not intractable  On Topamax for prophylactic therapy and Frova for abortive therapy  Frequency of migraines vary, seeing neurology every 3-4 months  Irritable bowel syndrome  Well controlled with Bentyl as needed  Rarely uses Bentyl  Gastroesophageal reflux disease without esophagitis  Well controlled with pepcid  Hyperlipidemia  Not at goal  Diet and exercise discussed  Recheck labs in 3-4 labs  Good HDL  Handouts provided  Hyperkalemia  Will update BMP  Mildly elevated  Her daughter's potassium currently elevated as well  Will recheck and then further evaluate  Will update BMP and Vitamin d and B12 now  Has vitamin D deficiency, does have very mild memory changes but thinks it's due to stress and how busy she is  Recheck lipids in 3-4 months  Diagnoses and all orders for this visit:    Mixed hyperlipidemia  -     Lipid panel; Future  -     Lipid panel    Chronic migraine without aura without status migrainosus, not intractable    Benign essential hypertension    Irritable bowel syndrome with both constipation and diarrhea  -     Vitamin B12; Future  -     Vitamin B12    Hyperkalemia  -     Basic metabolic panel; Future  -     Basic metabolic panel    Gastroesophageal reflux disease without esophagitis    Vitamin D deficiency  -     Vitamin D 25 hydroxy; Future  -     Vitamin D 25 hydroxy    Memory changes  -     Vitamin B12; Future  -     Vitamin B12    Other orders  -     Discontinue: cyclobenzaprine (FLEXERIL) 10 mg tablet; cyclobenzaprine 10 mg tablet      Patient verbalizes understand and agrees with treatment plan  Subjective:        Patient ID: Malaika Myrick is a 48 y o  female  Chief Complaint   Patient presents with    Follow-up     regarding HTN and HL; review labs       Patient presents to office today for 6 months follow up of HTN, hyperlipidemia, migraines, and IBS   She had lab work completed 11/10/18  Overall feeling good  She did review her lab work but would like to go over it again  The following portions of the patient's history were reviewed and updated as appropriate: allergies, current medications, past family history, past social history and problem list     Review of Systems   Constitutional: Negative for chills and fever  HENT: Negative for congestion  Eyes: Negative for pain and visual disturbance  Respiratory: Negative for cough and shortness of breath  Cardiovascular: Negative for chest pain, palpitations and leg swelling  Gastrointestinal: Negative for abdominal pain, diarrhea, nausea and vomiting  Genitourinary: Negative for difficulty urinating and dysuria  Musculoskeletal: Negative for arthralgias and myalgias  Skin: Negative for color change and rash  Neurological: Negative for dizziness, syncope, numbness and headaches  Hematological: Negative for adenopathy  Psychiatric/Behavioral: Negative for agitation and behavioral problems  The patient is not nervous/anxious  Objective:  /80 (BP Location: Left arm, Patient Position: Sitting, Cuff Size: Standard)   Ht 5' 5 5" (1 664 m)   Wt 65 8 kg (145 lb)   BMI 23 76 kg/m²      Physical Exam   Constitutional: She is oriented to person, place, and time  She appears well-developed and well-nourished  No distress  HENT:   Head: Normocephalic and atraumatic  Right Ear: External ear normal    Left Ear: External ear normal    Nose: Nose normal    Eyes: Conjunctivae and lids are normal  Right eye exhibits no discharge  Left eye exhibits no discharge  Neck: Normal range of motion  Neck supple  No tracheal deviation present  Cardiovascular: Normal rate and regular rhythm  No murmur heard  Pulmonary/Chest: Effort normal and breath sounds normal  No respiratory distress  She has no wheezes  Abdominal: Soft  Bowel sounds are normal  She exhibits no distension  There is no tenderness  There is no guarding  Musculoskeletal: Normal range of motion  She exhibits no edema or deformity  Lymphadenopathy:     She has no cervical adenopathy  Neurological: She is alert and oriented to person, place, and time  Coordination normal    Skin: Skin is warm and dry  No rash noted  She is not diaphoretic  No erythema  Psychiatric: She has a normal mood and affect  Her speech is normal and behavior is normal  Judgment and thought content normal  Cognition and memory are normal    Nursing note and vitals reviewed

## 2018-12-29 LAB
25(OH)D3+25(OH)D2 SERPL-MCNC: 58.9 NG/ML (ref 30–100)
BUN SERPL-MCNC: 18 MG/DL (ref 6–24)
BUN/CREAT SERPL: 23 (ref 9–23)
CALCIUM SERPL-MCNC: 9.9 MG/DL (ref 8.7–10.2)
CHLORIDE SERPL-SCNC: 105 MMOL/L (ref 96–106)
CO2 SERPL-SCNC: 20 MMOL/L (ref 20–29)
CREAT SERPL-MCNC: 0.77 MG/DL (ref 0.57–1)
GLUCOSE SERPL-MCNC: 89 MG/DL (ref 65–99)
POTASSIUM SERPL-SCNC: 4.5 MMOL/L (ref 3.5–5.2)
SL AMB EGFR AFRICAN AMERICAN: 102 ML/MIN/1.73
SL AMB EGFR NON AFRICAN AMERICAN: 88 ML/MIN/1.73
SODIUM SERPL-SCNC: 140 MMOL/L (ref 134–144)
VIT B12 SERPL-MCNC: 1526 PG/ML (ref 232–1245)

## 2019-01-10 DIAGNOSIS — K21.9 GASTROESOPHAGEAL REFLUX DISEASE WITHOUT ESOPHAGITIS: ICD-10-CM

## 2019-01-10 NOTE — TELEPHONE ENCOUNTER
From: Kaitlin Arango  Sent: 1/10/2019 5:48 PM EST  Subject: Medication Renewal Request    Parveen Carranza would like a refill of the following medications:     famotidine (PEPCID) 20 mg tablet JEANNIE Neff]    [id number]

## 2019-01-11 RX ORDER — FAMOTIDINE 20 MG/1
20 TABLET, FILM COATED ORAL 2 TIMES DAILY
Qty: 60 TABLET | Refills: 2 | Status: SHIPPED | OUTPATIENT
Start: 2019-01-11 | End: 2019-10-01 | Stop reason: ALTCHOICE

## 2019-01-14 ENCOUNTER — ANNUAL EXAM (OUTPATIENT)
Dept: OBGYN CLINIC | Facility: CLINIC | Age: 54
End: 2019-01-14
Payer: COMMERCIAL

## 2019-01-14 VITALS
DIASTOLIC BLOOD PRESSURE: 60 MMHG | BODY MASS INDEX: 22.53 KG/M2 | WEIGHT: 140.2 LBS | SYSTOLIC BLOOD PRESSURE: 96 MMHG | HEIGHT: 66 IN

## 2019-01-14 DIAGNOSIS — Z11.51 SCREENING FOR HUMAN PAPILLOMAVIRUS (HPV): ICD-10-CM

## 2019-01-14 DIAGNOSIS — Z01.419 ENCOUNTER FOR GYNECOLOGICAL EXAMINATION (GENERAL) (ROUTINE) WITHOUT ABNORMAL FINDINGS: Primary | ICD-10-CM

## 2019-01-14 DIAGNOSIS — Z12.39 SCREENING FOR MALIGNANT NEOPLASM OF BREAST: ICD-10-CM

## 2019-01-14 DIAGNOSIS — Z12.4 CERVICAL CANCER SCREENING: ICD-10-CM

## 2019-01-14 DIAGNOSIS — R92.2 DENSE BREAST: ICD-10-CM

## 2019-01-14 PROBLEM — R92.30 DENSE BREAST: Status: ACTIVE | Noted: 2019-01-14

## 2019-01-14 PROCEDURE — S0612 ANNUAL GYNECOLOGICAL EXAMINA: HCPCS | Performed by: OBSTETRICS & GYNECOLOGY

## 2019-01-14 NOTE — PROGRESS NOTES
Assessment/Plan:    No problem-specific Assessment & Plan notes found for this encounter  Diagnoses and all orders for this visit:    Encounter for gynecological examination (general) (routine) without abnormal findings    Dense breast  -     Mammo screening bilateral w 3d & cad; Future    Screening for malignant neoplasm of breast  -     Mammo screening bilateral w 3d & cad; Future    Cervical cancer screening  -     Pap IG, HPV-hr    Screening for human papillomavirus (HPV)  -     Pap IG, HPV-hr        Annual examination was completed  Pap with HPV testing was obtained  Mammogram was ordered  Patient to return to the office in 1 year or as necessary  Subjective:      Patient ID: Mira العلي is a 48 y o  female  Patient returns for annual gyn visit  She has no new medical surgical issues  Bowel and bladder issues are much improved over last year  She has been doing Kegel's exercises and notes significant change in her urinary frequency  She has no menses  She has occasional hot flash  She does occasionally get some exercise  She is up-to-date with mammography and colonoscopy  Gynecologic Exam         The following portions of the patient's history were reviewed and updated as appropriate:   She  has a past medical history of Amenorrhea; Anemia; Contact dermatitis; Crohn's disease ([address] ); Degenerative disc disease, cervical; Folliculitis; Glaucoma; Hyperlipidemia; Hypertension; IBS (irritable bowel syndrome); Menorrhagia; Migraine; and Vitamin D deficiency disease    She   Patient Active Problem List    Diagnosis Date Noted    Encounter for gynecological examination (general) (routine) without abnormal findings 01/14/2019    Dense breast 01/14/2019    Screening for malignant neoplasm of breast 01/14/2019    Cervical cancer screening 01/14/2019    Screening for human papillomavirus (HPV) 01/14/2019    Hyperkalemia 12/13/2018    Urinary urgency 05/14/2018    Chronic migraine without aura without status migrainosus, not intractable 2018    Dense breast tissue 2018    Gastroesophageal reflux disease without esophagitis 2017    Degenerative cervical disc 2015    Benign essential hypertension 2014    Hyperlipidemia 2014    Onychomycosis 2013    Irritable bowel syndrome 2012     She  has a past surgical history that includes  section; Dilation and curettage of uterus; pr colonoscopy flx dx w/collj spec when pfrmd (N/A, 9/15/2016); Laser ablation of vascular lesion; and Esophagogastroduodenoscopy  Her family history includes Arthritis in her mother; Hypertension in her father; Lymphoma in her father; Ulcerative colitis in her mother  She  reports that she has never smoked  She has never used smokeless tobacco  She reports that she drinks alcohol  She reports that she does not use drugs  Current Outpatient Prescriptions   Medication Sig Dispense Refill    dicyclomine (BENTYL) 20 mg tablet Take 20 mg by mouth every 6 (six) hours      famotidine (PEPCID) 20 mg tablet Take 1 tablet (20 mg total) by mouth 2 (two) times a day 60 tablet 2    frovatriptan (FROVA) 2 5 MG tablet   0    latanoprost (XALATAN) 0 005 % ophthalmic solution 1 drop daily at bedtime      topiramate (TOPAMAX) 25 mg sprinkle capsule Take 3 capsules (75 mg total) by mouth daily at bedtime 90 capsule 3     No current facility-administered medications for this visit        Current Outpatient Prescriptions on File Prior to Visit   Medication Sig    dicyclomine (BENTYL) 20 mg tablet Take 20 mg by mouth every 6 (six) hours    famotidine (PEPCID) 20 mg tablet Take 1 tablet (20 mg total) by mouth 2 (two) times a day    frovatriptan (FROVA) 2 5 MG tablet     latanoprost (XALATAN) 0 005 % ophthalmic solution 1 drop daily at bedtime    topiramate (TOPAMAX) 25 mg sprinkle capsule Take 3 capsules (75 mg total) by mouth daily at bedtime     No current facility-administered medications on file prior to visit  She is allergic to sulfa antibiotics       Review of Systems   All other systems reviewed and are negative  Objective:      BP 96/60 (BP Location: Left arm, Patient Position: Sitting, Cuff Size: Standard)   Ht 5' 6" (1 676 m)   Wt 63 6 kg (140 lb 3 2 oz)   BMI 22 63 kg/m²          Physical Exam   Constitutional: She is oriented to person, place, and time  She appears well-developed and well-nourished  HENT:   Head: Normocephalic and atraumatic  Nose: Nose normal    Eyes: Pupils are equal, round, and reactive to light  EOM are normal    Neck: Normal range of motion  Neck supple  No thyromegaly present  Cardiovascular: Normal rate and regular rhythm  Pulmonary/Chest: Effort normal and breath sounds normal  No respiratory distress  Breasts symmetrical without masses, skin changes or adenopathy   Abdominal: Soft  Bowel sounds are normal  She exhibits no distension and no mass  There is no tenderness  Hernia confirmed negative in the right inguinal area and confirmed negative in the left inguinal area  Genitourinary: Vagina normal and uterus normal  No breast swelling, tenderness, discharge or bleeding  Pelvic exam was performed with patient supine  No labial fusion  There is no rash, tenderness, lesion or injury on the right labia  There is no rash, tenderness, lesion or injury on the left labia  Cervix exhibits no motion tenderness, no discharge and no friability  Genitourinary Comments: Ext gen nl w/o lesions  Vag healthy w/o lesions or discharge  Cervix healthy w/o lesions  Uterus nl size, NT  Adnexa NT no masses  Rectal no masses   Musculoskeletal: Normal range of motion  She exhibits no edema  Lymphadenopathy:        Right: No inguinal adenopathy present  Left: No inguinal adenopathy present  Neurological: She is alert and oriented to person, place, and time  She has normal reflexes  Skin: Skin is warm and dry  No rash noted  Psychiatric: She has a normal mood and affect  Her behavior is normal  Thought content normal    Nursing note and vitals reviewed

## 2019-01-16 LAB
CYTOLOGIST CVX/VAG CYTO: NORMAL
DX ICD CODE: NORMAL
HPV I/H RISK 1 DNA CVX QL PROBE+SIG AMP: NEGATIVE
OTHER STN SPEC: NORMAL
PATH REPORT.FINAL DX SPEC: NORMAL
SL AMB NOTE:: NORMAL
SL AMB SPECIMEN ADEQUACY: NORMAL
SL AMB TEST METHODOLOGY: NORMAL

## 2019-02-18 DIAGNOSIS — G43.709 CHRONIC MIGRAINE WITHOUT AURA WITHOUT STATUS MIGRAINOSUS, NOT INTRACTABLE: ICD-10-CM

## 2019-02-18 RX ORDER — TOPIRAMATE 25 MG/1
75 CAPSULE, COATED PELLETS ORAL
Qty: 90 CAPSULE | Refills: 2 | Status: SHIPPED | OUTPATIENT
Start: 2019-02-18 | End: 2019-05-20 | Stop reason: SDUPTHER

## 2019-03-31 LAB
CHOLEST SERPL-MCNC: 215 MG/DL (ref 100–199)
CHOLEST/HDLC SERPL: 3.3 RATIO (ref 0–4.4)
HDLC SERPL-MCNC: 66 MG/DL
LDLC SERPL CALC-MCNC: 140 MG/DL (ref 0–99)
SL AMB VLDL CHOLESTEROL CALC: 9 MG/DL (ref 5–40)
TRIGL SERPL-MCNC: 46 MG/DL (ref 0–149)

## 2019-04-02 ENCOUNTER — OFFICE VISIT (OUTPATIENT)
Dept: NEUROLOGY | Facility: CLINIC | Age: 54
End: 2019-04-02
Payer: COMMERCIAL

## 2019-04-02 VITALS
WEIGHT: 128 LBS | DIASTOLIC BLOOD PRESSURE: 62 MMHG | HEIGHT: 66 IN | HEART RATE: 65 BPM | BODY MASS INDEX: 20.57 KG/M2 | SYSTOLIC BLOOD PRESSURE: 94 MMHG

## 2019-04-02 DIAGNOSIS — G44.89 OTHER HEADACHE SYNDROME: ICD-10-CM

## 2019-04-02 DIAGNOSIS — G43.709 CHRONIC MIGRAINE WITHOUT AURA WITHOUT STATUS MIGRAINOSUS, NOT INTRACTABLE: Primary | ICD-10-CM

## 2019-04-02 PROBLEM — R51.9 HEADACHE: Status: ACTIVE | Noted: 2019-04-02

## 2019-04-02 PROCEDURE — 99213 OFFICE O/P EST LOW 20 MIN: CPT | Performed by: PSYCHIATRY & NEUROLOGY

## 2019-04-02 RX ORDER — FROVATRIPTAN SUCCINATE 2.5 MG/1
2.5 TABLET, FILM COATED ORAL AS NEEDED
Qty: 9 TABLET | Refills: 1 | Status: SHIPPED | OUTPATIENT
Start: 2019-04-02 | End: 2020-10-15 | Stop reason: ALTCHOICE

## 2019-04-02 RX ORDER — OMEGA-3S/DHA/EPA/FISH OIL/D3 300MG-1000
400 CAPSULE ORAL DAILY
COMMUNITY

## 2019-05-06 ENCOUNTER — HOSPITAL ENCOUNTER (OUTPATIENT)
Dept: RADIOLOGY | Age: 54
Discharge: HOME | End: 2019-05-06
Payer: COMMERCIAL

## 2019-05-06 VITALS — WEIGHT: 127 LBS | BODY MASS INDEX: 20.41 KG/M2 | HEIGHT: 66 IN

## 2019-05-06 DIAGNOSIS — Z12.39 SCREENING FOR MALIGNANT NEOPLASM OF BREAST: ICD-10-CM

## 2019-05-06 DIAGNOSIS — R92.2 DENSE BREAST: ICD-10-CM

## 2019-05-06 PROCEDURE — 77067 SCR MAMMO BI INCL CAD: CPT

## 2019-05-06 PROCEDURE — 77063 BREAST TOMOSYNTHESIS BI: CPT

## 2019-05-09 ENCOUNTER — OFFICE VISIT (OUTPATIENT)
Dept: FAMILY MEDICINE CLINIC | Facility: CLINIC | Age: 54
End: 2019-05-09
Payer: COMMERCIAL

## 2019-05-09 VITALS
BODY MASS INDEX: 20.34 KG/M2 | HEIGHT: 66 IN | DIASTOLIC BLOOD PRESSURE: 66 MMHG | WEIGHT: 126.6 LBS | SYSTOLIC BLOOD PRESSURE: 112 MMHG

## 2019-05-09 DIAGNOSIS — E78.2 MIXED HYPERLIPIDEMIA: Primary | ICD-10-CM

## 2019-05-09 DIAGNOSIS — Z23 ENCOUNTER FOR IMMUNIZATION: ICD-10-CM

## 2019-05-09 PROCEDURE — 99213 OFFICE O/P EST LOW 20 MIN: CPT | Performed by: FAMILY MEDICINE

## 2019-05-09 PROCEDURE — 3008F BODY MASS INDEX DOCD: CPT | Performed by: FAMILY MEDICINE

## 2019-05-09 PROCEDURE — 1036F TOBACCO NON-USER: CPT | Performed by: FAMILY MEDICINE

## 2019-05-20 DIAGNOSIS — G43.709 CHRONIC MIGRAINE WITHOUT AURA WITHOUT STATUS MIGRAINOSUS, NOT INTRACTABLE: ICD-10-CM

## 2019-05-20 RX ORDER — TOPIRAMATE 25 MG/1
75 CAPSULE, COATED PELLETS ORAL DAILY
Qty: 90 CAPSULE | Refills: 2 | Status: SHIPPED | OUTPATIENT
Start: 2019-05-20 | End: 2019-08-27 | Stop reason: SDUPTHER

## 2019-08-27 DIAGNOSIS — G43.709 CHRONIC MIGRAINE WITHOUT AURA WITHOUT STATUS MIGRAINOSUS, NOT INTRACTABLE: ICD-10-CM

## 2019-08-27 RX ORDER — TOPIRAMATE 25 MG/1
75 CAPSULE, COATED PELLETS ORAL DAILY
Qty: 90 CAPSULE | Refills: 2 | Status: SHIPPED | OUTPATIENT
Start: 2019-08-27 | End: 2020-10-15 | Stop reason: ALTCHOICE

## 2019-08-27 NOTE — TELEPHONE ENCOUNTER
Medication refill check list    Correct patient? yes   Correct medication name, dose, and pill size? yes   Correct provider? yes   Last and Next appt  scheduled? Yes, last date 04/2/19 & next date 10/1/19   Right pharmacy listed? yes   Correct quantity for 30 or 90 days? yes   Is the patient out of refills? When was it last prescribed? Yes, last refill july   Directions match what the patient says they are taking?  yes   Enough refills? (none for controlled substances, 1 year for routine medications) yes

## 2019-10-01 ENCOUNTER — OFFICE VISIT (OUTPATIENT)
Dept: NEUROLOGY | Facility: CLINIC | Age: 54
End: 2019-10-01
Payer: COMMERCIAL

## 2019-10-01 VITALS
SYSTOLIC BLOOD PRESSURE: 120 MMHG | HEART RATE: 67 BPM | WEIGHT: 126 LBS | DIASTOLIC BLOOD PRESSURE: 80 MMHG | BODY MASS INDEX: 20.99 KG/M2 | HEIGHT: 65 IN

## 2019-10-01 DIAGNOSIS — E78.2 MIXED HYPERLIPIDEMIA: ICD-10-CM

## 2019-10-01 DIAGNOSIS — G43.709 CHRONIC MIGRAINE WITHOUT AURA WITHOUT STATUS MIGRAINOSUS, NOT INTRACTABLE: Primary | ICD-10-CM

## 2019-10-01 PROCEDURE — 99214 OFFICE O/P EST MOD 30 MIN: CPT | Performed by: PSYCHIATRY & NEUROLOGY

## 2019-10-01 RX ORDER — TOPIRAMATE 25 MG/1
75 TABLET ORAL EVERY 12 HOURS SCHEDULED
Qty: 90 TABLET | Refills: 3 | Status: SHIPPED | OUTPATIENT
Start: 2019-10-01 | End: 2020-02-03 | Stop reason: SDUPTHER

## 2019-10-01 NOTE — ASSESSMENT & PLAN NOTE
-Continue with prophylactic medication with Topamax 75mg PO qdaily  Failed previous medications - Amitriptyline and Propanolol  Continue Abortive therapy with frovatriptan    Counseling -   I Counseled the patient regarding avoiding taking analgesics no more than 3 times a week to avoid rebound headaches which are dull nagging headaches  I also counseled patient to avoid migraine triggers if possible  I recommended maintaining regular sleep routine and diet regimen  I also advised patient to maintain a headache diary

## 2019-10-01 NOTE — PROGRESS NOTES
Patient ID: Radha Alexander is a 48 y o  female  Assessment/Plan:    45 y/o F who is here as a follow up for chronic migraines  She previously failed propanolol and Amitriptyline  She was started on topamax and her headaches have been controlled since the start of topamax  Her headaches are 1-2 per month now  She has not had any side effects from the medication either  PLAN:        Problem List Items Addressed This Visit        Cardiovascular and Mediastinum    Chronic migraine without aura without status migrainosus, not intractable - Primary     -Continue with prophylactic medication with Topamax 75mg PO qdaily  Failed previous medications - Amitriptyline and Propanolol  Continue Abortive therapy with frovatriptan    Counseling -   I Counseled the patient regarding avoiding taking analgesics no more than 3 times a week to avoid rebound headaches which are dull nagging headaches  I also counseled patient to avoid migraine triggers if possible  I recommended maintaining regular sleep routine and diet regimen  I also advised patient to maintain a headache diary  Relevant Medications    topiramate (TOPAMAX) 25 mg tablet       Other    Hyperlipidemia    Relevant Medications    topiramate (TOPAMAX) 25 mg tablet         I would like to follow up in 1 year  I would be happy to see the patient sooner if any new questions/concerns arise  Patient/Guardian was advised to the call the office if they have any questions and concerns in the meantime  Patient/Guardian does understand that if they have any new stroke like symptoms such as facial droop on one side, weakness/paralysis on either side, speech trouble, numbness on one side, balance issues, any vision changes, extreme dizziness or any new headache, to call 9-1-1 immediately or to proceed to the nearest ER immediately  Subjective:    HPI    This is a 49 y/o F who is here as a follow up for chronic migraines    She is here after her 6 month follow-up  She states that her headaches are much better now  She is currently on Topamax 75 mg daily per  She wants to be switch from the capsules 2 tablets today  She also needs refills for her medications today  When she does get her headaches she does take Frova Triptan which does seem to help abort the headache  She states that after the start of Topamax her headaches are much better she has maybe 1-2 a month  Previously she was on propanolol and amitriptyline which did not help relieve her headaches and she also could not tolerate them due to side effects  She says that her triggers are usually sleep dehydration and stress from her family  The following portions of the patient's history were reviewed and updated as appropriate:   She  has a past medical history of Amenorrhea, Anemia, Contact dermatitis, Crohn's disease ([address] ), Degenerative disc disease, cervical, Folliculitis, Glaucoma, Hyperlipidemia, Hypertension, IBS (irritable bowel syndrome), Menorrhagia, Migraine, and Vitamin D deficiency disease    She   Patient Active Problem List    Diagnosis Date Noted    Headache 2019    Encounter for gynecological examination (general) (routine) without abnormal findings 2019    Dense breast 2019    Screening for malignant neoplasm of breast 2019    Cervical cancer screening 2019    Screening for human papillomavirus (HPV) 2019    Hyperkalemia 2018    Urinary urgency 2018    Chronic migraine without aura without status migrainosus, not intractable 2018    Dense breast tissue 2018    Gastroesophageal reflux disease without esophagitis 2017    Degenerative cervical disc 2015    Benign essential hypertension 2014    Hyperlipidemia 2014    Onychomycosis 2013    Irritable bowel syndrome 2012     She  has a past surgical history that includes  section; Dilation and curettage of uterus; pr colonoscopy flx dx w/collj spec when pfrmd (N/A, 9/15/2016); Laser ablation of vascular lesion; and Esophagogastroduodenoscopy  Her family history includes Arthritis in her mother; Hypertension in her father; Lymphoma in her father; No Known Problems in her daughter, daughter, daughter, maternal aunt, maternal grandfather, maternal grandmother, paternal aunt, paternal grandfather, and paternal grandmother; Ulcerative colitis in her mother  She  reports that she has never smoked  She has never used smokeless tobacco  She reports that she drinks alcohol  She reports that she does not use drugs  Current Outpatient Medications   Medication Sig Dispense Refill    cholecalciferol (VITAMIN D3) 400 units tablet Take 400 Units by mouth daily      Cyanocobalamin (VITAMIN B12 PO) Take by mouth daily      dicyclomine (BENTYL) 20 mg tablet Take 20 mg by mouth as needed       frovatriptan (FROVA) 2 5 MG tablet Take 1 tablet (2 5 mg total) by mouth as needed (headache) 9 tablet 1    latanoprost (XALATAN) 0 005 % ophthalmic solution 1 drop daily at bedtime      topiramate (TOPAMAX) 25 mg sprinkle capsule Take 3 capsules (75 mg total) by mouth daily 90 capsule 2    topiramate (TOPAMAX) 25 mg tablet Take 3 tablets (75 mg total) by mouth every 12 (twelve) hours 90 tablet 3     No current facility-administered medications for this visit        Current Outpatient Medications on File Prior to Visit   Medication Sig    cholecalciferol (VITAMIN D3) 400 units tablet Take 400 Units by mouth daily    Cyanocobalamin (VITAMIN B12 PO) Take by mouth daily    dicyclomine (BENTYL) 20 mg tablet Take 20 mg by mouth as needed     frovatriptan (FROVA) 2 5 MG tablet Take 1 tablet (2 5 mg total) by mouth as needed (headache)    latanoprost (XALATAN) 0 005 % ophthalmic solution 1 drop daily at bedtime    topiramate (TOPAMAX) 25 mg sprinkle capsule Take 3 capsules (75 mg total) by mouth daily    [DISCONTINUED] famotidine (PEPCID) 20 mg tablet Take 1 tablet (20 mg total) by mouth 2 (two) times a day     No current facility-administered medications on file prior to visit  She is allergic to sulfa antibiotics         Objective:    Blood pressure 120/80, pulse 67, height 5' 5" (1 651 m), weight 57 2 kg (126 lb)  Physical Exam  General - Patient is alert, awake, follows commands  Speech - no dysarthria noted, no aphasia noted  Neuro:   Cranial nerves: PERRL, EOMI, no facial droop noted, facial sensation intact, tongue midline  Motor: 5/5 throughout  Sensory - intact to soft touch throughout  Coordination - no ataxia/dysmetria noted  Gait - normal    ROS:  I personally reviewed ROS  Review of Systems   Constitutional: Negative  Negative for appetite change and fever  HENT: Negative  Negative for hearing loss, tinnitus, trouble swallowing and voice change  Eyes: Negative  Negative for photophobia and pain  Respiratory: Negative  Negative for shortness of breath  Cardiovascular: Negative  Negative for palpitations  Gastrointestinal: Negative  Negative for nausea and vomiting  Endocrine: Negative  Negative for cold intolerance and heat intolerance  Genitourinary: Negative  Negative for dysuria, frequency and urgency  Musculoskeletal: Negative  Negative for myalgias and neck pain  Skin: Negative  Negative for rash  Neurological: Positive for headaches  Negative for dizziness, tremors, seizures, syncope, facial asymmetry, speech difficulty, weakness, light-headedness and numbness  Patient states that her last headache a month ago  Hematological: Negative  Does not bruise/bleed easily  Psychiatric/Behavioral: Negative  Negative for confusion, hallucinations and sleep disturbance

## 2020-01-20 ENCOUNTER — ANNUAL EXAM (OUTPATIENT)
Dept: OBGYN CLINIC | Facility: CLINIC | Age: 55
End: 2020-01-20
Payer: COMMERCIAL

## 2020-01-20 VITALS
SYSTOLIC BLOOD PRESSURE: 110 MMHG | HEIGHT: 66 IN | WEIGHT: 132 LBS | BODY MASS INDEX: 21.21 KG/M2 | DIASTOLIC BLOOD PRESSURE: 76 MMHG

## 2020-01-20 DIAGNOSIS — Z12.31 ENCOUNTER FOR SCREENING MAMMOGRAM FOR MALIGNANT NEOPLASM OF BREAST: ICD-10-CM

## 2020-01-20 DIAGNOSIS — Z01.419 ENCOUNTER FOR GYNECOLOGICAL EXAMINATION (GENERAL) (ROUTINE) WITHOUT ABNORMAL FINDINGS: Primary | ICD-10-CM

## 2020-01-20 PROCEDURE — 3008F BODY MASS INDEX DOCD: CPT | Performed by: OBSTETRICS & GYNECOLOGY

## 2020-01-20 PROCEDURE — 3078F DIAST BP <80 MM HG: CPT | Performed by: OBSTETRICS & GYNECOLOGY

## 2020-01-20 PROCEDURE — 3074F SYST BP LT 130 MM HG: CPT | Performed by: OBSTETRICS & GYNECOLOGY

## 2020-01-20 PROCEDURE — 99396 PREV VISIT EST AGE 40-64: CPT | Performed by: OBSTETRICS & GYNECOLOGY

## 2020-01-20 NOTE — PROGRESS NOTES
Kate Valderrama   1965    CC:  Yearly exam    S:  47 y o  female here for yearly exam   She had an ablation in 2009, and has been amenorrheic  Had one period after her ablation - but nothing in years  She does have some hot flashes  She has had some slight weight gain and vaginal dryness  Some M[place] - improved with kegels, not bothersome  Has 3 biological daughters and one step-daughter, she is currently planning her wedding - [employer]  She denies vaginal discharge, itching, odor  Sexual activity: She is sexually active without pain, bleeding or dryness  Last Pap: 1/14/19 - Normal Cytology, Negative HPV  Last Mammo: 5/6/19 - [facility] 2, 3D  Last Colonoscopy:  9/15/16 - 10 year recall    We reviewed [facility]P guidelines for Pap testing       Family hx of breast cancer: no  Family hx of ovarian cancer:  no  Family hx of colon cancer:  no      Current Outpatient Medications:     cholecalciferol (VITAMIN D3) 400 units tablet, Take 400 Units by mouth daily, Disp: , Rfl:     Cyanocobalamin (VITAMIN B12 PO), Take by mouth daily, Disp: , Rfl:     dicyclomine (BENTYL) 20 mg tablet, Take 20 mg by mouth as needed , Disp: , Rfl:     frovatriptan (FROVA) 2 5 MG tablet, Take 1 tablet (2 5 mg total) by mouth as needed (headache), Disp: 9 tablet, Rfl: 1    latanoprost (XALATAN) 0 005 % ophthalmic solution, 1 drop daily at bedtime, Disp: , Rfl:     topiramate (TOPAMAX) 25 mg tablet, Take 3 tablets (75 mg total) by mouth every 12 (twelve) hours, Disp: 90 tablet, Rfl: 3    topiramate (TOPAMAX) 25 mg sprinkle capsule, Take 3 capsules (75 mg total) by mouth daily, Disp: 90 capsule, Rfl: 2  Social History     Socioeconomic History    Marital status: /Civil Union     Spouse name: Not on file    Number of children: Not on file    Years of education: Not on file    Highest education level: Not on file   Occupational History    Not on file   Social Needs    Financial resource strain: Not on file   [employer] Food insecurity:     Worry: Not on file     Inability: Not on file    Transportation needs:     Medical: Not on file     Non-medical: Not on file   Tobacco Use    Smoking status: Never Smoker    Smokeless tobacco: Never Used   Substance and Sexual Activity    Alcohol use: Yes     Frequency: Monthly or less     Drinks per session: 1 or 2     Binge frequency: Never     Comment: social     Drug use: No    Sexual activity: Yes     Partners: Male     Birth control/protection: Post-menopausal   Lifestyle    Physical activity:     Days per week: Not on file     Minutes per session: Not on file    Stress: Not on file   Relationships    Social connections:     Talks on phone: Not on file     Gets together: Not on file     Attends Jain service: Not on file     Active member of club or organization: Not on file     Attends meetings of clubs or organizations: Not on file     Relationship status: Not on file    Intimate partner violence:     Fear of current or ex partner: Not on file     Emotionally abused: Not on file     Physically abused: Not on file     Forced sexual activity: Not on file   Other Topics Concern    Not on file   Social History Narrative    Not on file     Family History   Problem Relation Age of Onset    Arthritis Mother     Ulcerative colitis Mother     Glaucoma Mother     Seizures Mother     Hypertension Father     Lymphoma Father         non hodkins    Raynaud syndrome Daughter     No Known Problems Maternal Grandmother     No Known Problems Maternal Grandfather     No Known Problems Paternal Grandmother     No Known Problems Paternal Grandfather     No Known Problems Daughter     No Known Problems Daughter     No Known Problems Maternal Aunt     No Known Problems Paternal Aunt      Past Medical History:   Diagnosis Date    Amenorrhea     Anemia     Contact dermatitis     last assessed: 6/17/2013    Crohn's disease ([facility] [address] )     last assessed: 8/8/2012   Neg for Crohns based on last GI eval and path   Degenerative disc disease, cervical     Folliculitis     last assessed: 7/30/2013    Hyperlipidemia     Hypertension     IBS (irritable bowel syndrome)     Menorrhagia     Migraine     Vitamin D deficiency disease     last assessed: 5/17/2016        Review of Systems   Respiratory: Negative  Cardiovascular: Negative  Gastrointestinal: Negative for constipation and diarrhea  Genitourinary: Negative for difficulty urinating, pelvic pain, vaginal bleeding, vaginal discharge, itching or odor  O:  Blood pressure 110/76, height 5' 5 5" (1 664 m), weight 59 9 kg (132 lb)  Patient appears well and is not in distress  Neck is supple without masses  Breasts are symmetrical without mass, tenderness, nipple discharge, skin changes or adenopathy  Abdomen is soft and nontender without masses  External genitals are normal without lesions or rashes  Urethral meatus and urethra are normal  Bladder is normal to palpation  Vagina is normal without discharge or bleeding  Cervix is normal without discharge or lesion  Uterus is normal, mobile, nontender without palpable mass  Adnexa are normal, nontender, without palpable mass  A:  Yearly exam    P:   Pap up to date  Mammo slip given   Colonoscopy up to date   Call with any bleeding or other concerns  Coconut oil for lubrication  RTO one year for yearly exam or sooner as needed

## 2020-02-03 DIAGNOSIS — G43.709 CHRONIC MIGRAINE WITHOUT AURA WITHOUT STATUS MIGRAINOSUS, NOT INTRACTABLE: ICD-10-CM

## 2020-02-03 DIAGNOSIS — E78.2 MIXED HYPERLIPIDEMIA: ICD-10-CM

## 2020-02-03 RX ORDER — TOPIRAMATE 25 MG/1
75 TABLET ORAL EVERY 12 HOURS SCHEDULED
Qty: 90 TABLET | Refills: 3 | Status: SHIPPED | OUTPATIENT
Start: 2020-02-03 | End: 2020-05-28 | Stop reason: SDUPTHER

## 2020-02-03 NOTE — TELEPHONE ENCOUNTER
Medication refill check list    Correct patient? Yes   Correct medication name, dose, and pill size? Yes   Correct provider? Yes   Last and Next appt  Scheduled? Last appt 10/1/2019  Next appt 10/2/2020   Right pharmacy listed? Yes   Correct quantity for 30 or 90 days? 90 day   Is the patient out of refills? When was it last prescribed? Last prescribed 10/1/2019   Directions match what the patient says they are taking?  Yes   Enough refills? (none for controlled substances, 1 year for routine medications) Yes

## 2020-05-11 ENCOUNTER — HOSPITAL ENCOUNTER (OUTPATIENT)
Dept: RADIOLOGY | Age: 55
Discharge: HOME | End: 2020-05-11
Payer: COMMERCIAL

## 2020-05-11 VITALS — BODY MASS INDEX: 21.21 KG/M2 | HEIGHT: 66 IN | WEIGHT: 132 LBS

## 2020-05-11 DIAGNOSIS — Z12.31 ENCOUNTER FOR SCREENING MAMMOGRAM FOR MALIGNANT NEOPLASM OF BREAST: ICD-10-CM

## 2020-05-11 PROCEDURE — 77067 SCR MAMMO BI INCL CAD: CPT

## 2020-05-11 PROCEDURE — 77063 BREAST TOMOSYNTHESIS BI: CPT

## 2020-05-12 ENCOUNTER — TELEMEDICINE (OUTPATIENT)
Dept: FAMILY MEDICINE CLINIC | Facility: CLINIC | Age: 55
End: 2020-05-12
Payer: COMMERCIAL

## 2020-05-12 DIAGNOSIS — F41.9 ANXIETY: ICD-10-CM

## 2020-05-12 DIAGNOSIS — R07.89 ATYPICAL CHEST PAIN: ICD-10-CM

## 2020-05-12 DIAGNOSIS — F43.9 STRESS AT HOME: ICD-10-CM

## 2020-05-12 DIAGNOSIS — E78.00 PURE HYPERCHOLESTEROLEMIA: Primary | ICD-10-CM

## 2020-05-12 PROBLEM — R51.9 HEADACHE: Status: RESOLVED | Noted: 2019-04-02 | Resolved: 2020-05-12

## 2020-05-12 PROCEDURE — 99443 PR PHYS/QHP TELEPHONE EVALUATION 21-30 MIN: CPT | Performed by: FAMILY MEDICINE

## 2020-05-28 ENCOUNTER — TELEPHONE (OUTPATIENT)
Dept: NEUROLOGY | Facility: CLINIC | Age: 55
End: 2020-05-28

## 2020-05-28 DIAGNOSIS — E78.2 MIXED HYPERLIPIDEMIA: ICD-10-CM

## 2020-05-28 DIAGNOSIS — G43.719 INTRACTABLE CHRONIC MIGRAINE WITHOUT AURA AND WITHOUT STATUS MIGRAINOSUS: Primary | ICD-10-CM

## 2020-05-28 DIAGNOSIS — G43.709 CHRONIC MIGRAINE WITHOUT AURA WITHOUT STATUS MIGRAINOSUS, NOT INTRACTABLE: ICD-10-CM

## 2020-05-28 RX ORDER — TOPIRAMATE 25 MG/1
75 TABLET ORAL EVERY 12 HOURS SCHEDULED
Qty: 90 TABLET | Refills: 0 | Status: SHIPPED | OUTPATIENT
Start: 2020-05-28 | End: 2020-07-01 | Stop reason: SDUPTHER

## 2020-05-28 RX ORDER — RIZATRIPTAN BENZOATE 10 MG/1
10 TABLET ORAL ONCE AS NEEDED
Qty: 9 TABLET | Refills: 1 | Status: SHIPPED | OUTPATIENT
Start: 2020-05-28 | End: 2021-12-16 | Stop reason: SDUPTHER

## 2020-07-01 DIAGNOSIS — E78.2 MIXED HYPERLIPIDEMIA: ICD-10-CM

## 2020-07-01 DIAGNOSIS — G43.709 CHRONIC MIGRAINE WITHOUT AURA WITHOUT STATUS MIGRAINOSUS, NOT INTRACTABLE: ICD-10-CM

## 2020-07-01 RX ORDER — TOPIRAMATE 25 MG/1
75 TABLET ORAL EVERY 12 HOURS SCHEDULED
Qty: 90 TABLET | Refills: 3 | Status: SHIPPED | OUTPATIENT
Start: 2020-07-01 | End: 2020-10-15 | Stop reason: SDUPTHER

## 2020-09-28 ENCOUNTER — TELEPHONE (OUTPATIENT)
Dept: NEUROLOGY | Facility: CLINIC | Age: 55
End: 2020-09-28

## 2020-10-15 ENCOUNTER — OFFICE VISIT (OUTPATIENT)
Dept: NEUROLOGY | Facility: CLINIC | Age: 55
End: 2020-10-15
Payer: COMMERCIAL

## 2020-10-15 VITALS
HEART RATE: 68 BPM | DIASTOLIC BLOOD PRESSURE: 71 MMHG | SYSTOLIC BLOOD PRESSURE: 108 MMHG | TEMPERATURE: 95.3 F | WEIGHT: 139 LBS | HEIGHT: 66 IN | BODY MASS INDEX: 22.34 KG/M2

## 2020-10-15 DIAGNOSIS — G43.709 CHRONIC MIGRAINE WITHOUT AURA WITHOUT STATUS MIGRAINOSUS, NOT INTRACTABLE: ICD-10-CM

## 2020-10-15 PROCEDURE — 99214 OFFICE O/P EST MOD 30 MIN: CPT | Performed by: PHYSICIAN ASSISTANT

## 2020-10-15 RX ORDER — TOPIRAMATE 25 MG/1
100 TABLET ORAL DAILY
Qty: 120 TABLET | Refills: 3 | Status: SHIPPED | OUTPATIENT
Start: 2020-10-15 | End: 2021-03-18 | Stop reason: SDUPTHER

## 2020-11-02 ENCOUNTER — TELEPHONE (OUTPATIENT)
Dept: NEUROLOGY | Facility: CLINIC | Age: 55
End: 2020-11-02

## 2020-11-02 DIAGNOSIS — G43.709 CHRONIC MIGRAINE WITHOUT AURA WITHOUT STATUS MIGRAINOSUS, NOT INTRACTABLE: Primary | ICD-10-CM

## 2020-11-03 RX ORDER — NORTRIPTYLINE HYDROCHLORIDE 10 MG/1
10 CAPSULE ORAL
Qty: 30 CAPSULE | Refills: 2 | Status: SHIPPED | OUTPATIENT
Start: 2020-11-03 | End: 2021-01-26 | Stop reason: SDUPTHER

## 2020-11-10 ENCOUNTER — TELEMEDICINE (OUTPATIENT)
Dept: FAMILY MEDICINE CLINIC | Facility: CLINIC | Age: 55
End: 2020-11-10
Payer: COMMERCIAL

## 2020-11-10 DIAGNOSIS — E78.00 PURE HYPERCHOLESTEROLEMIA: Primary | ICD-10-CM

## 2020-11-10 PROCEDURE — 1036F TOBACCO NON-USER: CPT | Performed by: FAMILY MEDICINE

## 2020-11-10 PROCEDURE — 3725F SCREEN DEPRESSION PERFORMED: CPT | Performed by: FAMILY MEDICINE

## 2020-11-10 PROCEDURE — 99213 OFFICE O/P EST LOW 20 MIN: CPT | Performed by: FAMILY MEDICINE

## 2020-12-16 ENCOUNTER — TELEPHONE (OUTPATIENT)
Dept: FAMILY MEDICINE CLINIC | Facility: CLINIC | Age: 55
End: 2020-12-16

## 2021-01-26 DIAGNOSIS — G43.709 CHRONIC MIGRAINE WITHOUT AURA WITHOUT STATUS MIGRAINOSUS, NOT INTRACTABLE: ICD-10-CM

## 2021-01-26 RX ORDER — NORTRIPTYLINE HYDROCHLORIDE 10 MG/1
10 CAPSULE ORAL
Qty: 30 CAPSULE | Refills: 3 | Status: SHIPPED | OUTPATIENT
Start: 2021-01-26 | End: 2021-03-18 | Stop reason: SDUPTHER

## 2021-03-15 ENCOUNTER — TELEPHONE (OUTPATIENT)
Dept: NEUROLOGY | Facility: CLINIC | Age: 56
End: 2021-03-15

## 2021-03-18 ENCOUNTER — OFFICE VISIT (OUTPATIENT)
Dept: NEUROLOGY | Facility: CLINIC | Age: 56
End: 2021-03-18
Payer: COMMERCIAL

## 2021-03-18 VITALS
SYSTOLIC BLOOD PRESSURE: 114 MMHG | WEIGHT: 140 LBS | HEART RATE: 84 BPM | HEIGHT: 66 IN | BODY MASS INDEX: 22.5 KG/M2 | DIASTOLIC BLOOD PRESSURE: 75 MMHG

## 2021-03-18 DIAGNOSIS — E78.2 MIXED HYPERLIPIDEMIA: ICD-10-CM

## 2021-03-18 DIAGNOSIS — G43.709 CHRONIC MIGRAINE WITHOUT AURA WITHOUT STATUS MIGRAINOSUS, NOT INTRACTABLE: Primary | ICD-10-CM

## 2021-03-18 PROCEDURE — 1036F TOBACCO NON-USER: CPT | Performed by: PSYCHIATRY & NEUROLOGY

## 2021-03-18 PROCEDURE — 99215 OFFICE O/P EST HI 40 MIN: CPT | Performed by: PSYCHIATRY & NEUROLOGY

## 2021-03-18 RX ORDER — NORTRIPTYLINE HYDROCHLORIDE 25 MG/1
25 CAPSULE ORAL
Qty: 30 CAPSULE | Refills: 2 | Status: SHIPPED | OUTPATIENT
Start: 2021-03-18 | End: 2021-06-15 | Stop reason: SDUPTHER

## 2021-03-18 RX ORDER — TOPIRAMATE 50 MG/1
50 TABLET, FILM COATED ORAL 2 TIMES DAILY
Qty: 60 TABLET | Refills: 2 | Status: SHIPPED | OUTPATIENT
Start: 2021-03-18 | End: 2021-06-15 | Stop reason: SDUPTHER

## 2021-03-18 NOTE — PROGRESS NOTES
Patient ID: Chintan Bush is a 54 y o  female  Assessment/Plan: This is a 53 y/o F who is here as a follow up for headache (chronic migraines)  PLAN:      Diagnoses and all orders for this visit:    Chronic migraine without aura without status migrainosus, not intractable  Preventive medications  - continue with topamax 50mg PO BID, nortriptyline 25mg PO QHS  , magnesium and riboflavin B2    Abortive medications - continue with maxalt PRN    Botox - Recommended botox injections every 3 months, discussed side effects from botox injections such as injection site swelling, ptosis and neck stiffness/pain and worsening headaches especially with the first treatment  Patient does not want to pursue at this time  Counseling / Headache management instructions-  - When patient has a moderate to severe headache, they should seek rest, initiate relaxation and apply cold compresses to the head  - Maintain regular sleep schedule  Adults need at least 7-8 hours of uninterrupted a night  - Limit over the counter medications such as Tylenol, Ibuprofen, Aleve, Excedrin  (No more than 3 times a week)  - Maintain headache diary  We discussed an MARK for a smart phone is "Migraine javon"  - Limit caffeine to 1-2 cups 8 to 16 oz a day or less, none after 3pm   - Avoid dietary trigger  (aged cheese, peanuts, MSG, aspartame and nitrates)  - Patient is to have regular frequent meals to prevent headache onset  - Please drink at least 64 ounces of water a day to help remain hydrated  -     nortriptyline (PAMELOR) 25 mg capsule; Take 1 capsule (25 mg total) by mouth daily at bedtime  -     topiramate (TOPAMAX) 50 MG tablet; Take 1 tablet (50 mg total) by mouth 2 (two) times a day    Mixed hyperlipidemia    Other orders  -     magnesium oxide (MAG-OX) 400 mg; Take 250 mg by mouth daily         Follow up - 3 months  I would be happy to see the patient sooner if any new questions/concerns arise    Patient/Guardian was advised to the call the office if they have any questions and concerns in the meantime  Patient/Guardian does understand that if they have any new stroke like symptoms such as facial droop on one side, weakness/paralysis on either side, speech trouble, numbness on one side, balance issues, any vision changes, extreme dizziness or any new headache, to call 9-1-1 immediately or to proceed to the nearest ER immediately  Subjective:    HPI     This is a 55 y/o Female who is here as a follow up of history of migraines  Patient is having headaches and during her last visit it was increased to topamax 100mg po QHS and nortriptyline 10mg PO QHS  Patient says that she is having 3-4 headaches a month, and she got her COVID-19 vaccine and she had a different headache, did not feel like her typical headache  She did not feel anything she said  The following portions of the patient's history were reviewed and updated as appropriate:   She  has a past medical history of Amenorrhea, Anemia, Contact dermatitis, Crohn's disease ([address] ), Degenerative disc disease, cervical, Folliculitis, Hyperlipidemia, IBS (irritable bowel syndrome), Menorrhagia, Migraine, and Vitamin D deficiency disease    She   Patient Active Problem List    Diagnosis Date Noted    Encounter for gynecological examination (general) (routine) without abnormal findings 01/14/2019    Dense breast 01/14/2019    Screening for malignant neoplasm of breast 01/14/2019    Cervical cancer screening 01/14/2019    Screening for human papillomavirus (HPV) 01/14/2019    Hyperkalemia 12/13/2018    Urinary urgency 05/14/2018    Chronic migraine without aura without status migrainosus, not intractable 02/12/2018    Gastroesophageal reflux disease without esophagitis 05/31/2017    Degenerative cervical disc 05/12/2015    Hyperlipidemia 07/21/2014    Onychomycosis 01/14/2013    Irritable bowel syndrome 08/08/2012     She  has a past surgical history that includes  section; Dilation and curettage of uterus; pr colonoscopy flx dx w/collj spec when pfrmd (N/A, 9/15/2016); Laser ablation of vascular lesion; Esophagogastroduodenoscopy; and Endometrial ablation  Her family history includes Arthritis in her mother; Glaucoma in her mother; Hypertension in her father; Lymphoma in her father; No Known Problems in her daughter, daughter, maternal aunt, maternal grandfather, maternal grandmother, paternal aunt, paternal grandfather, and paternal grandmother; Raynaud syndrome in her daughter; Seizures in her mother; Ulcerative colitis in her mother  She  reports that she has never smoked  She has never used smokeless tobacco  She reports current alcohol use  She reports that she does not use drugs  Current Outpatient Medications   Medication Sig Dispense Refill    cholecalciferol (VITAMIN D3) 400 units tablet Take 400 Units by mouth daily      Cyanocobalamin (VITAMIN B12 PO) Take by mouth daily      dicyclomine (BENTYL) 20 mg tablet Take 20 mg by mouth as needed       latanoprost (XALATAN) 0 005 % ophthalmic solution 1 drop daily at bedtime      magnesium oxide (MAG-OX) 400 mg Take 250 mg by mouth daily      nortriptyline (PAMELOR) 25 mg capsule Take 1 capsule (25 mg total) by mouth daily at bedtime 30 capsule 2    rizatriptan (MAXALT) 10 MG tablet Take 1 tablet (10 mg total) by mouth once as needed for migraine for up to 1 dose May repeat in 2 hours if needed 9 tablet 1    topiramate (TOPAMAX) 50 MG tablet Take 1 tablet (50 mg total) by mouth 2 (two) times a day 60 tablet 2     No current facility-administered medications for this visit        Current Outpatient Medications on File Prior to Visit   Medication Sig    cholecalciferol (VITAMIN D3) 400 units tablet Take 400 Units by mouth daily    Cyanocobalamin (VITAMIN B12 PO) Take by mouth daily    dicyclomine (BENTYL) 20 mg tablet Take 20 mg by mouth as needed     latanoprost (XALATAN) 0 005 % ophthalmic solution 1 drop daily at bedtime    magnesium oxide (MAG-OX) 400 mg Take 250 mg by mouth daily    rizatriptan (MAXALT) 10 MG tablet Take 1 tablet (10 mg total) by mouth once as needed for migraine for up to 1 dose May repeat in 2 hours if needed    [DISCONTINUED] nortriptyline (PAMELOR) 10 mg capsule Take 1 capsule (10 mg total) by mouth daily at bedtime    [DISCONTINUED] topiramate (TOPAMAX) 25 mg tablet Take 4 tablets (100 mg total) by mouth daily     No current facility-administered medications on file prior to visit  She is allergic to sulfa antibiotics            Objective:    Blood pressure 114/75, pulse 84, height 5' 6" (1 676 m), weight 63 5 kg (140 lb)  Physical Exam  General - Patient is alert, awake, oriented to time, place and person, follows commands  Speech - no dysarthria noted, no aphasia noted  Neuro:   Cranial nerves: PERRL, EOMI, facial sensation intact, able to raise eyebrows symmetrically, cannot assess facial droop and tongue deviation due to face mask requirement  Motor: 5/5 throughout, normal tone, no pronator drift noted  Sensory - intact to soft touch throughout  Coordination - no ataxia/dysmetria noted  Gait - normal     ROS:  I personally reviewed ROS   Review of Systems   Constitutional: Negative  Negative for appetite change and fever  HENT: Negative  Negative for hearing loss, tinnitus, trouble swallowing and voice change  Eyes: Negative  Negative for photophobia and pain  Respiratory: Negative  Negative for shortness of breath  Cardiovascular: Negative  Negative for palpitations  Gastrointestinal: Negative  Negative for nausea and vomiting  Endocrine: Negative  Negative for cold intolerance  Genitourinary: Negative  Negative for dysuria, frequency and urgency  Musculoskeletal: Negative  Negative for myalgias and neck pain  Skin: Negative  Negative for rash  Neurological: Positive for headaches (3-4 per week)   Negative for dizziness, tremors, seizures, syncope, facial asymmetry, speech difficulty, weakness, light-headedness and numbness  Hematological: Negative  Does not bruise/bleed easily  Psychiatric/Behavioral: Negative  Negative for confusion, hallucinations and sleep disturbance

## 2021-03-26 DIAGNOSIS — Z23 ENCOUNTER FOR IMMUNIZATION: ICD-10-CM

## 2021-04-12 ENCOUNTER — ANNUAL EXAM (OUTPATIENT)
Dept: OBGYN CLINIC | Facility: CLINIC | Age: 56
End: 2021-04-12
Payer: COMMERCIAL

## 2021-04-12 VITALS
BODY MASS INDEX: 22.73 KG/M2 | SYSTOLIC BLOOD PRESSURE: 130 MMHG | HEIGHT: 66 IN | DIASTOLIC BLOOD PRESSURE: 89 MMHG | WEIGHT: 141.4 LBS

## 2021-04-12 DIAGNOSIS — Z01.419 ENCOUNTER FOR GYNECOLOGICAL EXAMINATION (GENERAL) (ROUTINE) WITHOUT ABNORMAL FINDINGS: Primary | ICD-10-CM

## 2021-04-12 DIAGNOSIS — Z12.31 ENCOUNTER FOR SCREENING MAMMOGRAM FOR MALIGNANT NEOPLASM OF BREAST: ICD-10-CM

## 2021-04-12 PROBLEM — Z11.51 SCREENING FOR HUMAN PAPILLOMAVIRUS (HPV): Status: RESOLVED | Noted: 2019-01-14 | Resolved: 2021-04-12

## 2021-04-12 PROBLEM — Z12.4 CERVICAL CANCER SCREENING: Status: RESOLVED | Noted: 2019-01-14 | Resolved: 2021-04-12

## 2021-04-12 PROBLEM — Z12.39 SCREENING FOR MALIGNANT NEOPLASM OF BREAST: Status: RESOLVED | Noted: 2019-01-14 | Resolved: 2021-04-12

## 2021-04-12 PROCEDURE — S0612 ANNUAL GYNECOLOGICAL EXAMINA: HCPCS | Performed by: OBSTETRICS & GYNECOLOGY

## 2021-04-12 PROCEDURE — 3008F BODY MASS INDEX DOCD: CPT | Performed by: PSYCHIATRY & NEUROLOGY

## 2021-04-12 NOTE — PROGRESS NOTES
Bruno Rajesh   1965    CC:  Yearly exam    S:  54 y o  female here for yearly exam  She is postmenopausal (ablation in 2009, essentially amenorrheic after) and has had no vaginal bleeding  She denies vaginal discharge, itching, odor or dryness  Some hot flashes  History of [place] - this has improved some  She did Kegel excercises  Had a period of time with diarrhea - but this has resolved  No breast concerns  Daughters are doing well one to be  later this year, one in college, a HS Sr and 9th grader  Layman Cheadle is working in [employer] with [employer]  Sexual activity: She is sexually active without pain, bleeding or dryness  Last Pap: 1/14/19 - Normal Cytology, Neg HPV  Last Mammo: 5/11/20 - George Doing 2  Last Colonoscopy: 9/15/16 - 10yr recall    We reviewed ASC guidelines for Pap testing       Family hx of breast cancer: no  Family hx of ovarian cancer: no  Family hx of colon cancer: no      Current Outpatient Medications:     cholecalciferol (VITAMIN D3) 400 units tablet, Take 400 Units by mouth daily, Disp: , Rfl:     Cyanocobalamin (VITAMIN B12 PO), Take by mouth daily, Disp: , Rfl:     dicyclomine (BENTYL) 20 mg tablet, Take 20 mg by mouth as needed , Disp: , Rfl:     latanoprost (XALATAN) 0 005 % ophthalmic solution, 1 drop daily at bedtime, Disp: , Rfl:     magnesium oxide (MAG-OX) 400 mg, Take 250 mg by mouth daily, Disp: , Rfl:     nortriptyline (PAMELOR) 25 mg capsule, Take 1 capsule (25 mg total) by mouth daily at bedtime, Disp: 30 capsule, Rfl: 2    rizatriptan (MAXALT) 10 MG tablet, Take 1 tablet (10 mg total) by mouth once as needed for migraine for up to 1 dose May repeat in 2 hours if needed, Disp: 9 tablet, Rfl: 1    topiramate (TOPAMAX) 50 MG tablet, Take 1 tablet (50 mg total) by mouth 2 (two) times a day, Disp: 60 tablet, Rfl: 2  Social History     Socioeconomic History    Marital status: /Civil Union     Spouse name: Not on file    Number of children: Not on file    Years of education: Not on file    Highest education level: Not on file   Occupational History    Not on file   Social Needs    Financial resource strain: Not on file    Food insecurity     Worry: Not on file     Inability: Not on file    Transportation needs     Medical: Not on file     Non-medical: Not on file   Tobacco Use    Smoking status: Never Smoker    Smokeless tobacco: Never Used   Substance and Sexual Activity    Alcohol use: Yes     Frequency: Monthly or less     Drinks per session: 1 or 2     Binge frequency: Never     Comment: social     Drug use: Never    Sexual activity: Yes     Partners: Male     Birth control/protection: Post-menopausal   Lifestyle    Physical activity     Days per week: Not on file     Minutes per session: Not on file    Stress: Not on file   Relationships    Social connections     Talks on phone: Not on file     Gets together: Not on file     Attends Mu-ism service: Not on file     Active member of club or organization: Not on file     Attends meetings of clubs or organizations: Not on file     Relationship status: Not on file    Intimate partner violence     Fear of current or ex partner: Not on file     Emotionally abused: Not on file     Physically abused: Not on file     Forced sexual activity: Not on file   Other Topics Concern    Not on file   Social History Narrative    Not on file     Family History   Problem Relation Age of Onset    Arthritis Mother     Ulcerative colitis Mother     Glaucoma Mother     Seizures Mother     Hypertension Father     Lymphoma Father         non hodkins    Raynaud syndrome Daughter     No Known Problems Maternal Grandmother     No Known Problems Maternal Grandfather     No Known Problems Paternal Grandmother     No Known Problems Paternal Grandfather     No Known Problems Daughter     No Known Problems Daughter     No Known Problems Maternal Aunt     No Known Problems Paternal Aunt      Past Medical History:   Diagnosis Date    Amenorrhea     Anemia     Contact dermatitis     last assessed: 6/17/2013    [address]'s [address] [address] )     last assessed: 8/8/2012  Neg for Crohns based on last GI eval and path   Degenerative disc disease, cervical     Folliculitis     last assessed: 7/30/2013    Hyperlipidemia     IBS (irritable bowel syndrome)     Menorrhagia     Migraine     Vitamin D deficiency disease     last assessed: 5/17/2016        Review of Systems   Respiratory: Negative  Cardiovascular: Negative  Gastrointestinal: Negative for constipation and diarrhea  Genitourinary: Negative for difficulty urinating, pelvic pain, vaginal bleeding, vaginal discharge, itching or odor  O:  Blood pressure 130/89, height 5' 6" (1 676 m), weight 64 1 kg (141 lb 6 4 oz)  Patient appears well and is not in distress  Breasts are symmetrical without mass, tenderness, nipple discharge, skin changes or adenopathy  Abdomen is soft and nontender without masses  External genitals are normal without lesions or rashes  Urethral meatus and urethra are normal  Bladder is normal to palpation  Vagina is atrophic without discharge or bleeding  Cervix is normal without discharge or lesion  Uterus is normal, mobile, nontender without palpable mass  Adnexa are normal, nontender, without palpable mass  A:  Yearly exam      P:   Pap up to date, due on/before 01/2024  Mammo slip given   Colonoscopy up to date, next 2026 or PRN    RTO one year for yearly exam or sooner as needed

## 2021-06-14 ENCOUNTER — TELEPHONE (OUTPATIENT)
Dept: NEUROLOGY | Facility: CLINIC | Age: 56
End: 2021-06-14

## 2021-06-15 ENCOUNTER — OFFICE VISIT (OUTPATIENT)
Dept: NEUROLOGY | Facility: CLINIC | Age: 56
End: 2021-06-15
Payer: COMMERCIAL

## 2021-06-15 VITALS
WEIGHT: 143.8 LBS | BODY MASS INDEX: 23.11 KG/M2 | HEIGHT: 66 IN | HEART RATE: 78 BPM | SYSTOLIC BLOOD PRESSURE: 117 MMHG | DIASTOLIC BLOOD PRESSURE: 74 MMHG

## 2021-06-15 DIAGNOSIS — G43.709 CHRONIC MIGRAINE WITHOUT AURA WITHOUT STATUS MIGRAINOSUS, NOT INTRACTABLE: Primary | ICD-10-CM

## 2021-06-15 DIAGNOSIS — E78.2 MIXED HYPERLIPIDEMIA: ICD-10-CM

## 2021-06-15 PROCEDURE — 3008F BODY MASS INDEX DOCD: CPT | Performed by: PSYCHIATRY & NEUROLOGY

## 2021-06-15 PROCEDURE — 1036F TOBACCO NON-USER: CPT | Performed by: PSYCHIATRY & NEUROLOGY

## 2021-06-15 PROCEDURE — 99215 OFFICE O/P EST HI 40 MIN: CPT | Performed by: PSYCHIATRY & NEUROLOGY

## 2021-06-15 RX ORDER — TOPIRAMATE 50 MG/1
50 TABLET, FILM COATED ORAL 2 TIMES DAILY
Qty: 120 TABLET | Refills: 2 | Status: SHIPPED | OUTPATIENT
Start: 2021-06-15 | End: 2021-12-16 | Stop reason: SDUPTHER

## 2021-06-15 RX ORDER — NORTRIPTYLINE HYDROCHLORIDE 25 MG/1
25 CAPSULE ORAL
Qty: 90 CAPSULE | Refills: 2 | Status: SHIPPED | OUTPATIENT
Start: 2021-06-15 | End: 2021-12-16 | Stop reason: SDUPTHER

## 2021-06-15 NOTE — PROGRESS NOTES
Patient ID: Baldomero Duran is a 54 y o  female  Assessment/Plan: This is a 55 y/o Female who is here as a follow up for history of chronic migraine without aura  PLAN:      Diagnoses and all orders for this visit:    Chronic migraine without aura without status migrainosus, not intractable  Preventive medications  - continue with Pamelor, and Topamax 50 mg PO BID  Abortive medications - continue with maxalt PRN  Botox - does not want to pursue at this time  Counseling / Headache management instructions-  - When patient has a moderate to severe headache, they should seek rest, initiate relaxation and apply cold compresses to the head  - Maintain regular sleep schedule  Adults need at least 7-8 hours of uninterrupted a night  - Limit over the counter medications such as Tylenol, Ibuprofen, Aleve, Excedrin  (No more than 3 times a week)  - Maintain headache diary  We discussed an MARK for a smart phone is "Migraine javon"  - Limit caffeine to 1-2 cups 8 to 16 oz a day or less, none after 3pm   - Avoid dietary trigger  (aged cheese, peanuts, MSG, aspartame and nitrates)  - Patient is to have regular frequent meals to prevent headache onset  - Please drink at least 64 ounces of water a day to help remain hydrated  -     nortriptyline (PAMELOR) 25 mg capsule; Take 1 capsule (25 mg total) by mouth daily at bedtime  -     topiramate (TOPAMAX) 50 MG tablet; Take 1 tablet (50 mg total) by mouth 2 (two) times a day       Follow up - 3 months  I would be happy to see the patient sooner if any new questions/concerns arise  Patient/Guardian was advised to the call the office if they have any questions and concerns in the meantime       Patient/Guardian does understand that if they have any new stroke like symptoms such as facial droop on one side, weakness/paralysis on either side, speech trouble, numbness on one side, balance issues, any vision changes, extreme dizziness or any new headache, to call  immediately or to proceed to the nearest ER immediately  Total combined time spent 41 minutes today  Greater than 50% of total time was spent with the patient and or family counseling and or coordination of care  A brief description of coordination of care: stroke education risk, medication compliance, reviewing EMR, headache counseling    Subjective:    HPI    This is a 53 y/o F who is here as a follow up for chronic migraine without aura  Patient states that she is under a lot of stress with school closing, and she had some tingling/numbness on the R side of her face, and lasted for 2 hours and she had a headache everyday last week, and she took a maxalt which did seem to help  This week she has taken it easy, and she noticed that her headaches are much better now  She says that overall her headaches are better, and she was having them once every couple of weeks  She is on topamax 50mg and nortriptyline 25mg PO QHS  She does not want to pursue botox at this time  The following portions of the patient's history were reviewed and updated as appropriate:   She  has a past medical history of Amenorrhea, Anemia, Contact dermatitis, Crohn's disease ([facility] [address] ), Degenerative disc disease, cervical, Folliculitis, Hyperlipidemia, IBS (irritable bowel syndrome), Menorrhagia, Migraine, and Vitamin D deficiency disease    She   Patient Active Problem List    Diagnosis Date Noted    Dense breast 2019    Hyperkalemia 2018    Urinary urgency 2018    Chronic migraine without aura without status migrainosus, not intractable 2018    Gastroesophageal reflux disease without esophagitis 2017    Degenerative cervical disc 2015    Hyperlipidemia 2014    Onychomycosis 2013    Irritable bowel syndrome 2012     She  has a past surgical history that includes  section; Dilation and curettage of uterus; pr colonoscopy flx dx w/collj spec when pfrmd (N/A, 9/15/2016); Laser ablation of vascular lesion; Esophagogastroduodenoscopy; and Endometrial ablation  Her family history includes Arthritis in her mother; Glaucoma in her mother; Hypertension in her father; Lymphoma in her father; No Known Problems in her daughter, daughter, maternal aunt, maternal grandfather, maternal grandmother, paternal aunt, paternal grandfather, and paternal grandmother; Raynaud syndrome in her daughter; Seizures in her mother; Ulcerative colitis in her mother  She  reports that she has never smoked  She has never used smokeless tobacco  She reports current alcohol use  She reports that she does not use drugs  Current Outpatient Medications   Medication Sig Dispense Refill    cholecalciferol (VITAMIN D3) 400 units tablet Take 400 Units by mouth daily      Cyanocobalamin (VITAMIN B12 PO) Take by mouth daily      dicyclomine (BENTYL) 20 mg tablet Take 20 mg by mouth as needed       latanoprost (XALATAN) 0 005 % ophthalmic solution 1 drop daily at bedtime      magnesium oxide (MAG-OX) 400 mg Take 250 mg by mouth daily      nortriptyline (PAMELOR) 25 mg capsule Take 1 capsule (25 mg total) by mouth daily at bedtime 90 capsule 2    rizatriptan (MAXALT) 10 MG tablet Take 1 tablet (10 mg total) by mouth once as needed for migraine for up to 1 dose May repeat in 2 hours if needed 9 tablet 1    topiramate (TOPAMAX) 50 MG tablet Take 1 tablet (50 mg total) by mouth 2 (two) times a day 120 tablet 2     No current facility-administered medications for this visit       Current Outpatient Medications on File Prior to Visit   Medication Sig    cholecalciferol (VITAMIN D3) 400 units tablet Take 400 Units by mouth daily    Cyanocobalamin (VITAMIN B12 PO) Take by mouth daily    dicyclomine (BENTYL) 20 mg tablet Take 20 mg by mouth as needed     latanoprost (XALATAN) 0 005 % ophthalmic solution 1 drop daily at bedtime    magnesium oxide (MAG-OX) 400 mg Take 250 mg by mouth daily    rizatriptan (MAXALT) 10 MG tablet Take 1 tablet (10 mg total) by mouth once as needed for migraine for up to 1 dose May repeat in 2 hours if needed     No current facility-administered medications on file prior to visit  She is allergic to sulfa antibiotics            Objective:    Blood pressure 117/74, pulse 78, height 5' 6" (1 676 m), weight 65 2 kg (143 lb 12 8 oz)  Physical Exam  General - Patient is alert, awake, oriented to time, place and person, follows commands  Speech - no dysarthria noted, no aphasia noted  Neck - supple  Skin - no rashes or lesions  Chest - clear to ausculation bilaterally  Heart - normal S1, S2, no murmurs, rubs or gallops     Neuro:   Cranial nerves: PERRL, EOMI, facial sensation intact, able to raise eyebrows symmetrically, cannot assess facial droop and tongue deviation due to face mask requirement  Motor: 5/5 throughout, normal tone, no pronator drift noted  Sensory - intact to soft touch throughout  Reflexes - 2+ throughout  Coordination - no ataxia/dysmetria noted  Gait - normal      ROS:  I personally reviewed ROS   Review of Systems   Constitutional: Negative  Negative for appetite change and fever  HENT: Negative  Negative for hearing loss, tinnitus, trouble swallowing and voice change  Eyes: Negative  Negative for photophobia and pain  Respiratory: Negative  Negative for shortness of breath  Cardiovascular: Negative  Negative for palpitations  Gastrointestinal: Negative  Negative for nausea and vomiting  Endocrine: Negative  Negative for cold intolerance  Genitourinary: Negative  Negative for dysuria, frequency and urgency  Musculoskeletal: Positive for neck pain  Negative for myalgias  Back pain and had a fall on 06/07/2021   Skin: Negative  Negative for rash  Neurological: Positive for headaches (two episodes per month until 2week  every other day)   Negative for dizziness, tremors, seizures, facial asymmetry, speech difficulty, weakness, light-headedness and numbness  Tingling in the face   Hematological: Negative  Does not bruise/bleed easily  Psychiatric/Behavioral: Negative  Negative for confusion, hallucinations and sleep disturbance

## 2021-07-15 ENCOUNTER — HOSPITAL ENCOUNTER (OUTPATIENT)
Dept: RADIOLOGY | Age: 56
Discharge: HOME | End: 2021-07-15
Payer: COMMERCIAL

## 2021-07-15 VITALS — BODY MASS INDEX: 22.5 KG/M2 | HEIGHT: 66 IN | WEIGHT: 140 LBS

## 2021-07-15 DIAGNOSIS — Z12.31 ENCOUNTER FOR SCREENING MAMMOGRAM FOR MALIGNANT NEOPLASM OF BREAST: ICD-10-CM

## 2021-07-15 PROCEDURE — 77063 BREAST TOMOSYNTHESIS BI: CPT

## 2021-07-15 PROCEDURE — 77067 SCR MAMMO BI INCL CAD: CPT

## 2021-12-10 ENCOUNTER — RA CDI HCC (OUTPATIENT)
Dept: OTHER | Facility: HOSPITAL | Age: 56
End: 2021-12-10

## 2021-12-14 ENCOUNTER — TELEPHONE (OUTPATIENT)
Dept: NEUROLOGY | Facility: CLINIC | Age: 56
End: 2021-12-14

## 2021-12-16 ENCOUNTER — OFFICE VISIT (OUTPATIENT)
Dept: NEUROLOGY | Facility: CLINIC | Age: 56
End: 2021-12-16
Payer: COMMERCIAL

## 2021-12-16 VITALS
WEIGHT: 138 LBS | DIASTOLIC BLOOD PRESSURE: 60 MMHG | HEIGHT: 66 IN | SYSTOLIC BLOOD PRESSURE: 110 MMHG | HEART RATE: 70 BPM | BODY MASS INDEX: 22.18 KG/M2 | TEMPERATURE: 98.3 F

## 2021-12-16 DIAGNOSIS — G43.719 INTRACTABLE CHRONIC MIGRAINE WITHOUT AURA AND WITHOUT STATUS MIGRAINOSUS: ICD-10-CM

## 2021-12-16 DIAGNOSIS — G43.709 CHRONIC MIGRAINE WITHOUT AURA WITHOUT STATUS MIGRAINOSUS, NOT INTRACTABLE: ICD-10-CM

## 2021-12-16 PROCEDURE — 99214 OFFICE O/P EST MOD 30 MIN: CPT | Performed by: PSYCHIATRY & NEUROLOGY

## 2021-12-16 RX ORDER — TOPIRAMATE 50 MG/1
50 TABLET, FILM COATED ORAL 2 TIMES DAILY
Qty: 120 TABLET | Refills: 2 | Status: SHIPPED | OUTPATIENT
Start: 2021-12-16 | End: 2022-04-22 | Stop reason: SDUPTHER

## 2021-12-16 RX ORDER — RIZATRIPTAN BENZOATE 10 MG/1
10 TABLET ORAL ONCE AS NEEDED
Qty: 9 TABLET | Refills: 1 | Status: SHIPPED | OUTPATIENT
Start: 2021-12-16

## 2021-12-16 RX ORDER — NORTRIPTYLINE HYDROCHLORIDE 25 MG/1
25 CAPSULE ORAL
Qty: 90 CAPSULE | Refills: 2 | Status: SHIPPED | OUTPATIENT
Start: 2021-12-16 | End: 2022-04-22

## 2021-12-17 ENCOUNTER — OFFICE VISIT (OUTPATIENT)
Dept: FAMILY MEDICINE CLINIC | Facility: CLINIC | Age: 56
End: 2021-12-17
Payer: COMMERCIAL

## 2021-12-17 VITALS
HEART RATE: 74 BPM | DIASTOLIC BLOOD PRESSURE: 76 MMHG | WEIGHT: 145 LBS | HEIGHT: 66 IN | OXYGEN SATURATION: 97 % | SYSTOLIC BLOOD PRESSURE: 108 MMHG | BODY MASS INDEX: 23.3 KG/M2 | TEMPERATURE: 97.3 F

## 2021-12-17 DIAGNOSIS — H40.1130 PRIMARY OPEN ANGLE GLAUCOMA OF BOTH EYES, UNSPECIFIED GLAUCOMA STAGE: ICD-10-CM

## 2021-12-17 DIAGNOSIS — K58.2 IRRITABLE BOWEL SYNDROME WITH BOTH CONSTIPATION AND DIARRHEA: ICD-10-CM

## 2021-12-17 DIAGNOSIS — Z11.4 ENCOUNTER FOR SCREENING FOR HIV: ICD-10-CM

## 2021-12-17 DIAGNOSIS — Z82.49 FAMILY HISTORY OF CARDIAC DISORDER: ICD-10-CM

## 2021-12-17 DIAGNOSIS — Z11.59 NEED FOR HEPATITIS C SCREENING TEST: ICD-10-CM

## 2021-12-17 DIAGNOSIS — Z00.00 HEALTH MAINTENANCE EXAMINATION: Primary | ICD-10-CM

## 2021-12-17 DIAGNOSIS — G43.709 CHRONIC MIGRAINE WITHOUT AURA WITHOUT STATUS MIGRAINOSUS, NOT INTRACTABLE: ICD-10-CM

## 2021-12-17 DIAGNOSIS — E78.2 MIXED HYPERLIPIDEMIA: ICD-10-CM

## 2021-12-17 PROBLEM — R39.15 URINARY URGENCY: Status: RESOLVED | Noted: 2018-05-14 | Resolved: 2021-12-17

## 2021-12-17 PROBLEM — E87.5 HYPERKALEMIA: Status: RESOLVED | Noted: 2018-12-13 | Resolved: 2021-12-17

## 2021-12-17 PROCEDURE — 3725F SCREEN DEPRESSION PERFORMED: CPT | Performed by: NURSE PRACTITIONER

## 2021-12-17 PROCEDURE — 99396 PREV VISIT EST AGE 40-64: CPT | Performed by: NURSE PRACTITIONER

## 2021-12-17 RX ORDER — DICYCLOMINE HCL 20 MG
20 TABLET ORAL 3 TIMES DAILY PRN
Qty: 30 TABLET | Refills: 1 | Status: SHIPPED | OUTPATIENT
Start: 2021-12-17

## 2021-12-29 ENCOUNTER — OFFICE VISIT (OUTPATIENT)
Dept: FAMILY MEDICINE CLINIC | Facility: CLINIC | Age: 56
End: 2021-12-29
Payer: COMMERCIAL

## 2021-12-29 VITALS
BODY MASS INDEX: 23.88 KG/M2 | HEART RATE: 101 BPM | OXYGEN SATURATION: 97 % | SYSTOLIC BLOOD PRESSURE: 104 MMHG | DIASTOLIC BLOOD PRESSURE: 70 MMHG | WEIGHT: 148.6 LBS | RESPIRATION RATE: 16 BRPM | TEMPERATURE: 97.6 F | HEIGHT: 66 IN

## 2021-12-29 DIAGNOSIS — H92.01 RIGHT EAR PAIN: Primary | ICD-10-CM

## 2021-12-29 DIAGNOSIS — H60.501 ACUTE OTITIS EXTERNA OF RIGHT EAR, UNSPECIFIED TYPE: ICD-10-CM

## 2021-12-29 PROCEDURE — 99213 OFFICE O/P EST LOW 20 MIN: CPT | Performed by: FAMILY MEDICINE

## 2021-12-29 PROCEDURE — 3008F BODY MASS INDEX DOCD: CPT | Performed by: FAMILY MEDICINE

## 2021-12-29 PROCEDURE — 1036F TOBACCO NON-USER: CPT | Performed by: FAMILY MEDICINE

## 2021-12-29 RX ORDER — OFLOXACIN 3 MG/ML
5 SOLUTION AURICULAR (OTIC) DAILY
Qty: 10 ML | Refills: 0 | Status: SHIPPED | OUTPATIENT
Start: 2021-12-29 | End: 2022-01-05

## 2022-01-17 ENCOUNTER — CLINICAL SUPPORT (OUTPATIENT)
Dept: FAMILY MEDICINE CLINIC | Facility: CLINIC | Age: 57
End: 2022-01-17
Payer: COMMERCIAL

## 2022-01-17 DIAGNOSIS — R73.09 ELEVATED GLUCOSE: ICD-10-CM

## 2022-01-17 LAB — SL AMB POCT HEMOGLOBIN AIC: 5.5 (ref ?–6.5)

## 2022-01-17 PROCEDURE — 83036 HEMOGLOBIN GLYCOSYLATED A1C: CPT

## 2022-03-23 ENCOUNTER — TELEPHONE (OUTPATIENT)
Dept: OBGYN CLINIC | Facility: CLINIC | Age: 57
End: 2022-03-23

## 2022-03-23 NOTE — TELEPHONE ENCOUNTER
pts mother just got diagnosed with breast cancer and has 3 daughters and would like to know if there is any testing that can be done    Please advise

## 2022-03-25 ENCOUNTER — TELEPHONE (OUTPATIENT)
Dept: OBGYN CLINIC | Facility: CLINIC | Age: 57
End: 2022-03-25

## 2022-03-25 NOTE — TELEPHONE ENCOUNTER
I would just ensure she is up to date with her routine screening  Did her mother require any genetic testing to be done with her diagnosis? If other family history of breast cancer could consider referral to oncology genetics

## 2022-03-25 NOTE — TELEPHONE ENCOUNTER
I called and spoke to pt informing below  She said her mother did not require any genetic testing to be done to her knowledge  Also no fam hx of breast cancer  I told her to just make sure she does self breast exams and if anything were to come up she can call us  She is up to date with mammo, had one 7/2021 so she will get new script when seeing you in July for annual  She asked if she should tell her daughters same thing and I did recc as well for self breast exam, also for them to go to their annuals as well  Anything else I need to let her know? I'm assuming at this point she doesn't need the referral correct?

## 2022-03-25 NOTE — TELEPHONE ENCOUNTER
----- Message from Michaela Carranza sent at 3/24/2022  6:56 PM EDT -----  Regarding: Recent family Dx of  Breast Cancer  My Mother was recently diagnosed with Breast Cancer  She had a Right Breast Lumpectomy done on 3/18/22  Is there any follow up that my 3 daughters or myself have to due because of this recent diagnosis? I spoke to someone in your office on 3/23/22 asking this question but did not receive a call back         Thank Tracy Brown

## 2022-04-05 ENCOUNTER — TELEPHONE (OUTPATIENT)
Dept: NEUROLOGY | Facility: CLINIC | Age: 57
End: 2022-04-05

## 2022-04-05 NOTE — TELEPHONE ENCOUNTER
Dr Jona Mixon asked for assistance with rescheduling to 4/15 with Aure[facility] at 8:15 am (and patient in that slot will be rescheduled to 4/26 at 3:30 pm)       Called patient with no answer  Left message on voicemail box for call back

## 2022-04-11 ENCOUNTER — TELEPHONE (OUTPATIENT)
Dept: FAMILY MEDICINE CLINIC | Facility: CLINIC | Age: 57
End: 2022-04-11

## 2022-04-11 DIAGNOSIS — R39.9 UTI SYMPTOMS: Primary | ICD-10-CM

## 2022-04-11 RX ORDER — NITROFURANTOIN 25; 75 MG/1; MG/1
100 CAPSULE ORAL 2 TIMES DAILY
Qty: 14 CAPSULE | Refills: 0 | Status: SHIPPED | OUTPATIENT
Start: 2022-04-11 | End: 2022-07-18

## 2022-04-11 NOTE — TELEPHONE ENCOUNTER
Pt called today  She took an OTC Azo UTI test  It was positive  She is wondering if she can please have an antibiotic? She is having a bunch of pressure and slight burning while urinating  Please advise

## 2022-04-21 ENCOUNTER — TELEPHONE (OUTPATIENT)
Dept: NEUROLOGY | Facility: CLINIC | Age: 57
End: 2022-04-21

## 2022-04-21 NOTE — TELEPHONE ENCOUNTER
ADD ON 4/22:    Patient called to reschedule for a sooner appointment  Offered her an appointment with Lachelle Husain in [place] at 315 pm and she said she'll take it

## 2022-04-22 ENCOUNTER — OFFICE VISIT (OUTPATIENT)
Dept: NEUROLOGY | Facility: CLINIC | Age: 57
End: 2022-04-22
Payer: COMMERCIAL

## 2022-04-22 VITALS
HEART RATE: 83 BPM | BODY MASS INDEX: 23.95 KG/M2 | HEIGHT: 66 IN | WEIGHT: 149 LBS | DIASTOLIC BLOOD PRESSURE: 67 MMHG | SYSTOLIC BLOOD PRESSURE: 110 MMHG | TEMPERATURE: 97.8 F

## 2022-04-22 DIAGNOSIS — G43.709 CHRONIC MIGRAINE WITHOUT AURA WITHOUT STATUS MIGRAINOSUS, NOT INTRACTABLE: Primary | ICD-10-CM

## 2022-04-22 DIAGNOSIS — M54.2 CERVICALGIA: ICD-10-CM

## 2022-04-22 PROCEDURE — 1036F TOBACCO NON-USER: CPT | Performed by: NURSE PRACTITIONER

## 2022-04-22 PROCEDURE — 3008F BODY MASS INDEX DOCD: CPT | Performed by: NURSE PRACTITIONER

## 2022-04-22 PROCEDURE — 99214 OFFICE O/P EST MOD 30 MIN: CPT | Performed by: NURSE PRACTITIONER

## 2022-04-22 RX ORDER — NORTRIPTYLINE HYDROCHLORIDE 25 MG/1
50 CAPSULE ORAL
Qty: 180 CAPSULE | Refills: 2 | Status: SHIPPED | OUTPATIENT
Start: 2022-04-22

## 2022-04-22 RX ORDER — TOPIRAMATE 50 MG/1
50 TABLET, FILM COATED ORAL 2 TIMES DAILY
Qty: 120 TABLET | Refills: 2 | Status: SHIPPED | OUTPATIENT
Start: 2022-04-22

## 2022-04-22 NOTE — PROGRESS NOTES
Patient ID: Torrey Smith is a 64 y o  female  Assessment/Plan:    Chronic migraine without aura without status migrainosus, not intractable  Patient with headaches 1-2 times per week over the past few months, recently worsening over the past week with increased stressors which will likely be ongoing  At this time given no significant improvement since last visit in headaches, agreeable to med changes  Plan:  -increase nortriptyline to 50 mg qhs  -did have neck spasm/tension on exam, has done PT in the past and will perform exercises at home  -remain on current dose of b2, magnesium, topamax (had side effects with higher doses in the past), maxalt    Could consider SNRI in the future or gabapentin, would avoid antihypertensives given her BP runs on low end of normal  If headaches significantly worsen could consider CGRP  Diagnoses and all orders for this visit:    Chronic migraine without aura without status migrainosus, not intractable  -     nortriptyline (PAMELOR) 25 mg capsule; Take 2 capsules (50 mg total) by mouth daily at bedtime  -     topiramate (TOPAMAX) 50 MG tablet; Take 1 tablet (50 mg total) by mouth 2 (two) times a day    Cervicalgia           Subjective:    HPI       This is a 63 y/o F who is here as a follow up for chronic migraines  Last office visit 12/2021 in which no changes were made-did have a lot of stressful life events so had worsening headaches  Interval History:  Has noted worsening headaches over the past week  Since last seen her headaches haven't improved much but were more manageable per patient  Her mom was recently diagnosed with breast cancer  This week she has had 1 migraine, moderate headache about 2 days this week  Other then this week she was have a migraine once a week  Half the time it would resolve without medication  Do resolve majority of the time with maxalt  She is able to function with the migraines currently         Location: temples , parietal bilaterally    Duration: this week-all day, previous-a few hours    Timing: varies    Description: pounding, feels like head is in a "vice", Rates: severe-8/10, moderate 4-5/10    Associated symptoms: photophobia, phonophobia, vomiting, pins and needles on the temple rarely    She has had facial droop in the past with headaches, but has not had this in 10 years  Aura: none    Triggers: Weather at times    Lifestyle: 2-3 cups of coffee daily, 3-4 bottles of water daily, 6 hours nightly sometimes can have trouble sleeping    Denies vision changes, dizziness, no worsening with coughing/sneezing/exercise/position change  Current medications:  Preventative:   nortriptyline 25 mg 1 tab qhs  topamax 50mg BID  Vitamin b2  Magnesium oxide 400 mg daily    Abortive:  Rizatriptan 10 mg   motrin        prior medicaitons:  topamax, nortriptyline, magnesium, b2     maxalt            Objective:    Blood pressure 110/67, pulse 83, temperature 97 8 °F (36 6 °C), temperature source Temporal, height 5' 6" (1 676 m), weight 67 6 kg (149 lb), not currently breastfeeding  Physical Exam  Constitutional:       General: She is awake  HENT:      Right Ear: Hearing normal       Left Ear: Hearing normal    Eyes:      General: Lids are normal       Extraocular Movements: Extraocular movements intact  Pupils: Pupils are equal, round, and reactive to light  Neurological:      Mental Status: She is alert  Deep Tendon Reflexes: Strength normal       Reflex Scores:       Bicep reflexes are 2+ on the right side and 2+ on the left side  Brachioradialis reflexes are 2+ on the right side and 2+ on the left side  Patellar reflexes are 2+ on the right side and 2+ on the left side  Achilles reflexes are 2+ on the right side and 2+ on the left side  Psychiatric:         Speech: Speech normal          Neurological Exam  Mental Status  Awake and alert  Oriented to person, place, time and situation   Speech is normal  Language is fluent with no aphasia  Cranial Nerves  CN II: Visual fields full to confrontation  CN III, IV, VI: Extraocular movements intact bilaterally  Normal lids and orbits bilaterally  Pupils equal round and reactive to light bilaterally  CN V:  Right: Facial sensation is normal   Left: Facial sensation is normal on the left  CN VII:  Right: There is no facial weakness  Left: There is no facial weakness  CN VIII:  Right: Hearing is normal   Left: Hearing is normal   CN IX, X: Palate elevates symmetrically  CN XI:  Right: Trapezius strength is normal   Left: Trapezius strength is normal   CN XII: Tongue midline without atrophy or fasciculations  Motor   Strength is 5/5 throughout all four extremities  Sensory  Light touch is normal in upper and lower extremities  Temperature is normal in upper and lower extremities  Reflexes                                           Right                      Left  Brachioradialis                    2+                         2+  Biceps                                 2+                         2+  Patellar                                2+                         2+  Achilles                                2+                         2+    Coordination  Right: Finger-to-nose normal   Left: Finger-to-nose normal     Gait  Casual gait is normal including stance, stride, and arm swing  Able to rise from chair without using arms  I have personally reviewed the ROS performed by the MA   ROS:    Review of Systems   Constitutional: Negative  Negative for appetite change and fever  HENT: Negative  Negative for hearing loss, tinnitus, trouble swallowing and voice change  Eyes: Negative  Negative for photophobia and pain  Respiratory: Negative  Negative for shortness of breath  Cardiovascular: Negative  Negative for palpitations  Gastrointestinal: Negative  Negative for nausea and vomiting  Endocrine: Negative    Negative for cold intolerance  Genitourinary: Negative  Negative for dysuria, frequency and urgency  Musculoskeletal: Negative  Negative for myalgias and neck pain  Skin: Negative  Negative for rash  Neurological: Positive for headaches  Negative for dizziness, tremors, seizures, syncope, facial asymmetry, speech difficulty, weakness, light-headedness and numbness  Hematological: Negative  Does not bruise/bleed easily  Psychiatric/Behavioral: Negative  Negative for confusion, hallucinations and sleep disturbance  All other systems reviewed and are negative

## 2022-07-18 ENCOUNTER — ANNUAL EXAM (OUTPATIENT)
Dept: OBGYN CLINIC | Facility: CLINIC | Age: 57
End: 2022-07-18
Payer: COMMERCIAL

## 2022-07-18 VITALS
BODY MASS INDEX: 24.89 KG/M2 | SYSTOLIC BLOOD PRESSURE: 110 MMHG | HEIGHT: 65 IN | DIASTOLIC BLOOD PRESSURE: 80 MMHG | WEIGHT: 149.4 LBS

## 2022-07-18 DIAGNOSIS — Z12.31 ENCOUNTER FOR SCREENING MAMMOGRAM FOR BREAST CANCER: ICD-10-CM

## 2022-07-18 DIAGNOSIS — N39.0 URINARY TRACT INFECTION WITHOUT HEMATURIA, SITE UNSPECIFIED: ICD-10-CM

## 2022-07-18 DIAGNOSIS — Z01.419 ROUTINE GYNECOLOGICAL EXAMINATION: Primary | ICD-10-CM

## 2022-07-18 PROCEDURE — S0612 ANNUAL GYNECOLOGICAL EXAMINA: HCPCS | Performed by: OBSTETRICS & GYNECOLOGY

## 2022-07-18 NOTE — PROGRESS NOTES
Jaquan Boykin   1965    CC:  Yearly exam    S:  64 y o  female here for yearly exam  She is postmenopausal and has had no vaginal bleeding  Ablation in 2009  She denies vaginal discharge, itching, odor or dryness  Mother diagnosed with breast cancer since last visit (77-77yo)  Stage 2 based on sized - had surgery with Dr Dahiana Frey, and follows with Dr Trini Reyes - completing radiation today  Frustrated by inability to lose weight as easily in the past - discussed normal metabolic changes with menopause, watching carbs/sugars  Sexual activity: She is sexually active without pain, bleeding  She does note dryness with intercourse, does not use lubricants  She does report occasional stress urinary incontinence,   Also has a urinary odor since treatment of her UTI with macrobid  No bowel problems or breast concerns  Last Pap: 1/14/19 - NILM, neg HPV  Last Mammo: 7/15/21 - BIRad 2  Last Colonoscopy: 9/15/16 - 10yr recall     We reviewed ASCCP guidelines for Pap testing  Family hx of breast cancer:  Mother  Family hx of ovarian cancer: no  Family hx of colon cancer: no      Current Outpatient Medications:     cholecalciferol (VITAMIN D3) 400 units tablet, Take 400 Units by mouth daily, Disp: , Rfl:     Cyanocobalamin (VITAMIN B12 PO), Take by mouth daily, Disp: , Rfl:     dicyclomine (BENTYL) 20 mg tablet, Take 1 tablet (20 mg total) by mouth 3 (three) times a day as needed (abdominal cramping), Disp: 30 tablet, Rfl: 1    latanoprost (XALATAN) 0 005 % ophthalmic solution, 1 drop daily at bedtime, Disp: , Rfl:     magnesium oxide (MAG-OX) 400 mg, Take 250 mg by mouth daily, Disp: , Rfl:     nortriptyline (PAMELOR) 25 mg capsule, Take 2 capsules (50 mg total) by mouth daily at bedtime, Disp: 180 capsule, Rfl: 2    Riboflavin (VITAMIN B-2 PO), Take by mouth daily, Disp: , Rfl:     rizatriptan (MAXALT) 10 MG tablet, Take 1 tablet (10 mg total) by mouth once as needed for migraine for up to 1 dose May repeat in 2 hours if needed, Disp: 9 tablet, Rfl: 1    topiramate (TOPAMAX) 50 MG tablet, Take 1 tablet (50 mg total) by mouth 2 (two) times a day, Disp: 120 tablet, Rfl: 2    nitrofurantoin (MACROBID) 100 mg capsule, Take 1 capsule (100 mg total) by mouth 2 (two) times a day (Patient not taking: No sig reported), Disp: 14 capsule, Rfl: 0  Patient Active Problem List   Diagnosis    Chronic migraine without aura without status migrainosus, not intractable    Gastroesophageal reflux disease without esophagitis    Degenerative cervical disc    Irritable bowel syndrome    Dense breast    Primary open angle glaucoma (POAG) of both eyes     Family History   Problem Relation Age of Onset    Arthritis Mother     Ulcerative colitis Mother     Glaucoma Mother     Seizures Mother     Stroke Mother    [facility] Breast cancer Mother     Hypertension Father     Lymphoma Father         non hodkins    Raynaud syndrome Daughter     No Known Problems Maternal Grandmother     No Known Problems Maternal Grandfather     No Known Problems Paternal Grandmother     No Known Problems Paternal Grandfather     No Known Problems Daughter     No Known Problems Daughter     No Known Problems Maternal Aunt     No Known Problems Paternal Aunt      Past Medical History:   Diagnosis Date    Amenorrhea     Anemia     Contact dermatitis     last assessed: 6/17/2013    Crohn's disease ([facility] [address] )     last assessed: 8/8/2012  Neg for Crohns based on last GI eval and path   Degenerative disc disease, cervical     Folliculitis     last assessed: 7/30/2013    Hyperlipidemia     IBS (irritable bowel syndrome)     Menorrhagia     Migraine     Vitamin D deficiency disease     last assessed: 5/17/2016        Review of Systems   Respiratory: Negative  Cardiovascular: Negative  Gastrointestinal: Negative for constipation and diarrhea     Genitourinary: Negative for difficulty urinating, pelvic pain, vaginal bleeding, vaginal discharge, itching or odor  O:  Blood pressure 110/80, height 5' 5" (1 651 m), weight 67 8 kg (149 lb 6 4 oz), not currently breastfeeding  Patient appears well and is not in distress  Breasts are symmetrical without mass, tenderness, nipple discharge, skin changes or adenopathy  Abdomen is soft and nontender without masses  External genitals are normal without lesions or rashes  Urethral meatus and urethra are normal  Bladder is normal to palpation  Vagina is normal without discharge or bleeding, generalized atrophic changes present   Cervix is normal without discharge or lesion  Uterus is normal, mobile, nontender without palpable mass  Adnexa are normal, nontender, without palpable mass  A:  Yearly exam      P:   Pap & HPV due 2023  Mammo ordered   Colon Cancer Screening up to date      Discussed OTC options for lubricants  Will check urine culture due to continued UTI symptoms (odor)     Reviewed considerations of menopause, to call with any postmenopausal bleeding or other concerns  RTO one year for yearly exam or sooner as needed

## 2022-07-28 DIAGNOSIS — N39.0 URINARY TRACT INFECTION WITHOUT HEMATURIA, SITE UNSPECIFIED: Primary | ICD-10-CM

## 2022-07-28 NOTE — TELEPHONE ENCOUNTER
----- Message from Verma Spurling, MD sent at 7/28/2022  8:03 AM EDT -----  She has a sulfa allergy, so please send:  Keflex 500mg BID x 7d  ----- Message -----  From: Verma Spurling, MD  Sent: 7/28/2022   8:03 AM EDT  To:     Urine culture still positive for E   Coli - would treat with Bactrim DS BID x 5d

## 2022-07-28 NOTE — TELEPHONE ENCOUNTER
Spoke to pt and reviewed results and recommendations from their UA per CT  [place] in [place] and will  meds this wknd

## 2022-07-29 RX ORDER — CEPHALEXIN 500 MG/1
500 CAPSULE ORAL EVERY 12 HOURS SCHEDULED
Qty: 14 CAPSULE | Refills: 0 | Status: SHIPPED | OUTPATIENT
Start: 2022-07-29 | End: 2022-08-05

## 2022-08-17 ENCOUNTER — TELEPHONE (OUTPATIENT)
Dept: NEUROLOGY | Facility: CLINIC | Age: 57
End: 2022-08-17

## 2022-08-17 NOTE — TELEPHONE ENCOUNTER
Patient called in after receiving Elva's  regarding the appt on 8/24 w/ Dr John Simons that needled to be rescheduled  Patient was asking if there was anything sooner as she is having some medication issues that she wanted to address  From the VM, she said [place] mentioned Sae Copping for another follow-up if there was anything sooner with her  Esha Greenhouse had opening on 8/24 (so patient can still keep appt on same day)    RESCHEDULED: Wed, 8/24/22 at 12:45pm w/ Sae Venegas in [place]  Pt made of office address/directions  Thank you!

## 2022-08-17 NOTE — TELEPHONE ENCOUNTER
I called patient LVM to patient that her appointment has been cancel and rescheduled do to provider is out of office  I also mailed new appointment card to patient

## 2022-08-19 ENCOUNTER — HOSPITAL ENCOUNTER (OUTPATIENT)
Dept: MAMMOGRAPHY | Facility: MEDICAL CENTER | Age: 57
Discharge: HOME | End: 2022-08-19
Payer: COMMERCIAL

## 2022-08-19 VITALS — HEIGHT: 65 IN | WEIGHT: 149 LBS | BODY MASS INDEX: 24.83 KG/M2

## 2022-08-19 DIAGNOSIS — Z12.31 ENCOUNTER FOR SCREENING MAMMOGRAM FOR BREAST CANCER: ICD-10-CM

## 2022-08-19 PROCEDURE — 77067 SCR MAMMO BI INCL CAD: CPT

## 2022-08-19 PROCEDURE — 77063 BREAST TOMOSYNTHESIS BI: CPT

## 2022-08-24 ENCOUNTER — OFFICE VISIT (OUTPATIENT)
Dept: NEUROLOGY | Facility: CLINIC | Age: 57
End: 2022-08-24
Payer: COMMERCIAL

## 2022-08-24 VITALS
WEIGHT: 150 LBS | BODY MASS INDEX: 24.99 KG/M2 | HEIGHT: 65 IN | HEART RATE: 98 BPM | SYSTOLIC BLOOD PRESSURE: 133 MMHG | DIASTOLIC BLOOD PRESSURE: 86 MMHG

## 2022-08-24 DIAGNOSIS — G43.709 CHRONIC MIGRAINE WITHOUT AURA WITHOUT STATUS MIGRAINOSUS, NOT INTRACTABLE: Primary | ICD-10-CM

## 2022-08-24 PROCEDURE — 99214 OFFICE O/P EST MOD 30 MIN: CPT | Performed by: NURSE PRACTITIONER

## 2022-08-24 RX ORDER — GALCANEZUMAB 120 MG/ML
INJECTION, SOLUTION SUBCUTANEOUS
Qty: 1 ML | Refills: 11 | Status: SHIPPED | OUTPATIENT
Start: 2022-08-24

## 2022-08-24 RX ORDER — KETOROLAC TROMETHAMINE 10 MG/1
TABLET, FILM COATED ORAL
Qty: 30 TABLET | Refills: 0 | Status: SHIPPED | OUTPATIENT
Start: 2022-08-24

## 2022-08-24 RX ORDER — GALCANEZUMAB 120 MG/ML
INJECTION, SOLUTION SUBCUTANEOUS
Qty: 2 ML | Refills: 0 | Status: SHIPPED | OUTPATIENT
Start: 2022-08-24

## 2022-08-24 RX ORDER — FROVATRIPTAN SUCCINATE 2.5 MG/1
2.5 TABLET, FILM COATED ORAL AS NEEDED
COMMUNITY

## 2022-08-24 NOTE — ASSESSMENT & PLAN NOTE
Patient with headaches up to 4 times per week over the past few months  Did notice mild improvement with increase in topamax and nortriptyline initially but has been experiencing side effects of dry mouth so would not furher increase  Given side effects and headache frequency will adjust medications  Her BP does tend to run low normal at times so have been avoiding antihypertensives  She was interested in CGRP if covered by insurance  It not covered by could consider SSRI/SNRI or other anticonvulsant  We did also discuss botox  Plan:  -start emgality 120 mg monthly  -remain on current dose of b2, magnesium, topamax (had side effects with higher doses in the past), nortriptyline  Will plan to wean once emgaility is started given side effects   -for abortive can continue frovatriptan, will add on toradol prn during the day as triptan causes sedation and she does not utilize during the day  Could also consider nurtec or ubrelvy in the future  Plan for follow up in 4 months  To contact the office sooner with any concerns or worsening symptoms

## 2022-08-24 NOTE — PROGRESS NOTES
Patient ID: Cheyenne Moses is a 64 y o  female  Assessment/Plan:    Chronic migraine without aura without status migrainosus, not intractable  Patient with headaches up to 4 times per week over the past few months  Did notice mild improvement with increase in topamax and nortriptyline initially but has been experiencing side effects of dry mouth so would not furher increase  Given side effects and headache frequency will adjust medications  Her BP does tend to run low normal at times so have been avoiding antihypertensives  She was interested in CGRP if covered by insurance  It not covered by could consider SSRI/SNRI or other anticonvulsant  We did also discuss botox  Plan:  -start emgality 120 mg monthly  -remain on current dose of b2, magnesium, topamax (had side effects with higher doses in the past), nortriptyline  Will plan to wean once emgaility is started given side effects   -for abortive can continue frovatriptan, will add on toradol prn during the day as triptan causes sedation and she does not utilize during the day  Could also consider nurtec or ubrelvy in the future  Plan for follow up in 4 months  To contact the office sooner with any concerns or worsening symptoms  Diagnoses and all orders for this visit:    Chronic migraine without aura without status migrainosus, not intractable  -     Galcanezumab-gnlm (Emgality) 120 MG/ML SOAJ; Take 2 ml under the skin once  -     Galcanezumab-gnlm (Emgality) 120 MG/ML SOAJ; Take 1 ml under the skin monthly  -     ketorolac (TORADOL) 10 mg tablet; Take 1 tab at onset of headache    Other orders  -     frovatriptan (FROVA) 2 5 MG tablet; Take 2 5 mg by mouth if needed for migraine If recurs, may repeat after 2 hours  Max of 3 tabs in 24 hours  Subjective:    HPI    This is a 63 y/o F who is here as a follow up for chronic migraines  Last office visit 4/2022 in which her nortriptyline was increased          Interval History:  Increase in topamax and nortriptyline did mildly improve for a short period of time but then headache worsened  She is feeling side effects from medication dry mouth  She is having about 4 headaches per week  They last for several hours  She does take the frovatriptan that does help with the migraine but does make her drowsy so she cannot take this while is working  No new symptoms with headaches  She can get neck pain with the headaches  She has been on BP medications in the past but she is not sure if these affected her migraines  Prior hx   Location: temples , parietal bilaterally     Duration: this week-all day, previous-a few hours     Timing: varies     Description: pounding, feels like head is in a "vice", Rates: severe-8/10, moderate 4-5/10     Associated symptoms: photophobia, phonophobia, vomiting, pins and needles on the temple rarely     She has had facial droop in the past with headaches, but has not had this in 10 years  Aura: none     Triggers: Weather at times, stress     Lifestyle: 2-3 cups of coffee daily, 3-4 bottles of water daily, 6 hours nightly sometimes can have trouble sleeping     Denies vision changes, dizziness, no worsening with coughing/sneezing/exercise/position change  Current medications:  Preventative:   nortriptyline 50 mg 1 tab qhs  topamax 50mg BID  Vitamin b2  Magnesium oxide 400 mg daily       Abortive:  frovatriptan  motrin        prior medicaitons:  topamax, nortriptyline, magnesium, b2     maxalt  Rizatriptan 10 mg-was changed due to insurance    The following portions of the patient's history were reviewed and updated as appropriate: allergies, current medications, past family history, past medical history, past social history, past surgical history and problem list           Objective:    Blood pressure 133/86, pulse 98, height 5' 5" (1 651 m), weight 68 kg (150 lb), not currently breastfeeding      Physical Exam  Constitutional: General: She is awake  HENT:      Right Ear: Hearing normal       Left Ear: Hearing normal    Eyes:      General: Lids are normal       Extraocular Movements: Extraocular movements intact  Pupils: Pupils are equal, round, and reactive to light  Neurological:      Mental Status: She is alert  Deep Tendon Reflexes: Strength normal       Reflex Scores:       Bicep reflexes are 2+ on the right side and 2+ on the left side  Brachioradialis reflexes are 2+ on the right side and 2+ on the left side  Patellar reflexes are 2+ on the right side and 2+ on the left side  Achilles reflexes are 2+ on the right side and 2+ on the left side  Psychiatric:         Speech: Speech normal          Neurological Exam  Mental Status  Awake and alert  Oriented to person, place, time and situation  Speech is normal  Language is fluent with no aphasia  Cranial Nerves  CN II: Visual fields full to confrontation  CN III, IV, VI: Extraocular movements intact bilaterally  Normal lids and orbits bilaterally  Pupils equal round and reactive to light bilaterally  CN V:  Right: Facial sensation is normal   Left: Facial sensation is normal on the left  CN VII:  Right: There is no facial weakness  Left: There is no facial weakness  CN VIII:  Right: Hearing is normal   Left: Hearing is normal   CN IX, X: Palate elevates symmetrically  CN XI:  Right: Trapezius strength is normal   Left: Trapezius strength is normal   CN XII: Tongue midline without atrophy or fasciculations  Motor   Strength is 5/5 throughout all four extremities  Sensory  Light touch is normal in upper and lower extremities  Temperature is normal in upper and lower extremities       Reflexes                                            Right                      Left  Brachioradialis                    2+                         2+  Biceps                                 2+                         2+  Patellar                                2+ 2+  Achilles                                2+                         2+    Coordination  Right: Finger-to-nose normal Left: Finger-to-nose normal     Gait  Casual gait is normal including stance, stride, and arm swing  Able to rise from chair without using arms  I have personally reviewed the ROS performed by the MA     ROS:    Review of Systems   Constitutional: Negative  HENT: Negative  Eyes: Negative  Respiratory: Negative  Cardiovascular: Negative  Gastrointestinal: Negative  Endocrine: Negative  Genitourinary: Negative  Musculoskeletal: Negative  Skin: Negative  Allergic/Immunologic: Negative  Neurological: Positive for headaches  Hematological: Negative  Psychiatric/Behavioral: Negative

## 2022-08-24 NOTE — PATIENT INSTRUCTIONS
Start emgality    During the day you can take toradol for headache, do not take any other NSAIDs that day

## 2022-08-29 ENCOUNTER — TELEPHONE (OUTPATIENT)
Dept: NEUROLOGY | Facility: CLINIC | Age: 57
End: 2022-08-29

## 2022-09-02 NOTE — TELEPHONE ENCOUNTER
Emgality denied  You must have failed 2 formulary alternative drugs: depakote delayed release   venlafaxine extended release     Please advise

## 2022-09-02 NOTE — TELEPHONE ENCOUNTER
Please let patient know emgality was denied and has to try other alternatives  My suggestion would be to trial a low dose of velenlafaxine ER 37 5 mg daily, if no improvement after 1-2 months can increase to 75 mg daily (2 of the 37 5 mg tabs)  If that does not work we can get her off of topamax and trial depakote  Please let me know if agreeable and I will send in medication to pharmacy

## 2022-09-08 DIAGNOSIS — G43.719 INTRACTABLE CHRONIC MIGRAINE WITHOUT AURA AND WITHOUT STATUS MIGRAINOSUS: Primary | ICD-10-CM

## 2022-09-08 RX ORDER — VENLAFAXINE HYDROCHLORIDE 37.5 MG/1
37.5 TABLET, EXTENDED RELEASE ORAL
Qty: 90 TABLET | Refills: 2 | Status: SHIPPED | OUTPATIENT
Start: 2022-09-08

## 2022-09-08 NOTE — TELEPHONE ENCOUNTER
Spoke with patient and went over Gregoria's response  Patient would like weaning instructions for Topamax sent to her My Chart  My Chart message with weaning instructions generated and sent

## 2022-09-08 NOTE — TELEPHONE ENCOUNTER
Spoke with patient  Notified of Emgality denial and insurance reasoning for denial   Pt understood  Patient is agreeable to trying Venlafaxine  Patient states she is currently on Topamax 50 mg BID  As well as Nortriptyline 50 mg at HS    Topamax per patient is not doing anything for her, and does not like to s/e of it  States in fact her headaches have gotten worse on medication  She would like to begin weaning process now and not wait if possible  Also asked if she should stay on Nortriptyline while on Venlafaxine? Please advise      CB# [phone]

## 2022-09-08 NOTE — TELEPHONE ENCOUNTER
Yes I would stay on nortriptyline while on venlafaxine-they are both low dose so would not be worried about interaction  She can wean topamax-would have her take topamax 50 mg 1/2 tab in the AM, 1 tab in the PM for 1 week, then 1/2 tab BID for 1 week, then 1/2 tab QHS for 1 week then stop  I will send in prescription for venlafaxine

## 2022-09-12 ENCOUNTER — TELEPHONE (OUTPATIENT)
Dept: NEUROLOGY | Facility: CLINIC | Age: 57
End: 2022-09-12

## 2022-09-12 NOTE — TELEPHONE ENCOUNTER
Received PA request for venlafaxine    Submitted PA on CMM, awaiting determination      Key: [id number]

## 2022-09-14 NOTE — TELEPHONE ENCOUNTER
If she does not want to pay goodrx price I could try to send in non-extended release and see what the cost is   It would just need to be taken BID

## 2022-09-14 NOTE — TELEPHONE ENCOUNTER
Venlafaxine denied  Patient's condiditon is not approved diagnosis for medication     There is also a good Rx coupon, 30 day supply $24 80 and 90 day supply of medication is $65 40  If there are no there recommendations we can offer patient coupon if she is agreeable     Please advise   Non extended release is likely cheaper with coupon, not sure if it would require PA

## 2022-09-16 NOTE — TELEPHONE ENCOUNTER
Called patient  She states he already filled 90 day supply of medication for $65  She states that she will complete her 90 day supply of the medication and once it is finishing she will try the non extended release version BID   Patient believes the medication is working for her

## 2022-11-11 ENCOUNTER — OFFICE VISIT (OUTPATIENT)
Dept: FAMILY MEDICINE CLINIC | Facility: CLINIC | Age: 57
End: 2022-11-11

## 2022-11-11 VITALS
OXYGEN SATURATION: 97 % | SYSTOLIC BLOOD PRESSURE: 122 MMHG | TEMPERATURE: 97.8 F | BODY MASS INDEX: 25.12 KG/M2 | HEIGHT: 65 IN | HEART RATE: 78 BPM | DIASTOLIC BLOOD PRESSURE: 80 MMHG | WEIGHT: 150.8 LBS

## 2022-11-11 DIAGNOSIS — R82.998 DARK URINE: ICD-10-CM

## 2022-11-11 DIAGNOSIS — R76.8 POSITIVE ANA (ANTINUCLEAR ANTIBODY): Primary | ICD-10-CM

## 2022-11-11 DIAGNOSIS — R82.90 MALODOROUS URINE: ICD-10-CM

## 2022-11-11 LAB
SL AMB  POCT GLUCOSE, UA: ABNORMAL
SL AMB LEUKOCYTE ESTERASE,UA: 15
SL AMB POCT BILIRUBIN,UA: ABNORMAL
SL AMB POCT BLOOD,UA: ABNORMAL
SL AMB POCT CLARITY,UA: CLEAR
SL AMB POCT COLOR,UA: ABNORMAL
SL AMB POCT KETONES,UA: ABNORMAL
SL AMB POCT NITRITE,UA: ABNORMAL
SL AMB POCT PH,UA: 8
SL AMB POCT SPECIFIC GRAVITY,UA: 1
SL AMB POCT URINE PROTEIN: 0.3
SL AMB POCT UROBILINOGEN: 3.5

## 2022-11-11 RX ORDER — VARENICLINE 0.03 MG/.05ML
1 SPRAY NASAL 2 TIMES DAILY
COMMUNITY

## 2022-11-11 NOTE — PATIENT INSTRUCTIONS
Make follow up with rheumatology  We will call with urine testing results  Please call the office if you are experiencing any worsening of symptoms or no symptom improvement

## 2022-11-11 NOTE — PROGRESS NOTES
Assessment/Plan:   Diagnosis ICD-10-CM Associated Orders   1  Positive SANDEEP (antinuclear antibody)  R76 8 Ambulatory Referral to Rheumatology   2  Malodorous urine  R82 90 POCT urine dip     Urine culture     Urine culture   3  Dark urine  R82 998 POCT urine dip     Urine culture     Urine culture   Discussed test results  Make follow up with rheumatology  We will call with urine testing results  Advised to call the office for any worsening of symptoms or no symptom improvement  Patient verbalizes understand and agrees with treatment plan  Diagnoses and all orders for this visit:    Positive SANDEEP (antinuclear antibody)  -     Ambulatory Referral to Rheumatology; Future    Malodorous urine  -     POCT urine dip  -     Urine culture; Future  -     Urine culture    Dark urine  -     POCT urine dip  -     Urine culture; Future  -     Urine culture    Other orders  -     Varenicline Tartrate (Tyrvaya) 0 03 MG/ACT SOLN; 1 spray into each nostril 2 (two) times a day              Subjective:        Patient ID: Piedad Salas is a 62 y o  female  Chief Complaint   Patient presents with   • abnormal lab results     Patient was seen by her eye doctor due to dry eyes  She had labs done for an auto-immune disease and was told her SANDEEP was very high at 640  Here to discuss next step in work up  Patient presents to discuss abnormal lab results: Patient was seen by her eye doctor due to dry eyes  She had labs done for an auto-immune disease and was told her SANDEEP was very high at 640  Here to discuss next step in work up  It was checked at the end of August which was positive and repeat SANDEEP at the end of September it was positive  Has had dry eyes/ dry mouth for a while, this was not new for her  For her yearly GYN she felt she had a urine infection because it was a strong odor and darker but no dysuria  She was treated for a UTI at that time  It cleared up but now the symptoms are back again   This was last treated July 2022  No dysuria no hematuria  The following portions of the patient's history were reviewed and updated as appropriate: allergies, current medications, past family history, past social history and problem list     Review of Systems   Constitutional: Negative for chills and fever  Eyes: Negative for discharge  Respiratory: Negative for shortness of breath  Cardiovascular: Negative for chest pain  Gastrointestinal: Negative for constipation and diarrhea  Genitourinary: Negative for difficulty urinating  Musculoskeletal: Negative for joint swelling  Skin: Negative for rash  Neurological: Negative for headaches  Hematological: Negative for adenopathy  Psychiatric/Behavioral: The patient is not nervous/anxious  Objective:  /80   Pulse 78   Temp 97 8 °F (36 6 °C) (Temporal)   Ht 5' 5" (1 651 m)   Wt 68 4 kg (150 lb 12 8 oz)   LMP  (LMP Unknown)   SpO2 97%   BMI 25 09 kg/m²      Physical Exam  Vitals and nursing note reviewed  Constitutional:       General: She is not in acute distress  Appearance: She is well-developed  She is not diaphoretic  HENT:      Head: Normocephalic and atraumatic  Right Ear: External ear normal       Left Ear: External ear normal    Eyes:      General: Lids are normal          Right eye: No discharge  Left eye: No discharge  Conjunctiva/sclera: Conjunctivae normal    Pulmonary:      Effort: Pulmonary effort is normal  No respiratory distress  Musculoskeletal:         General: No deformity  Cervical back: Neck supple  Skin:     General: Skin is warm and dry  Neurological:      Mental Status: She is alert and oriented to person, place, and time  Psychiatric:         Speech: Speech normal          Behavior: Behavior normal          Thought Content:  Thought content normal          Judgment: Judgment normal              Depression Screening and Follow-up Plan: Patient was screened for depression during today's encounter  They screened negative with a PHQ-2 score of 0  Current Outpatient Medications:   •  cholecalciferol (VITAMIN D3) 400 units tablet, Take 400 Units by mouth daily, Disp: , Rfl:   •  Cyanocobalamin (VITAMIN B12 PO), Take 1 tablet by mouth daily, Disp: , Rfl:   •  dicyclomine (BENTYL) 20 mg tablet, Take 1 tablet (20 mg total) by mouth 3 (three) times a day as needed (abdominal cramping), Disp: 30 tablet, Rfl: 1  •  frovatriptan (FROVA) 2 5 MG tablet, Take 2 5 mg by mouth if needed for migraine If recurs, may repeat after 2 hours  Max of 3 tabs in 24 hours  , Disp: , Rfl:   •  Galcanezumab-gnlm (Emgality) 120 MG/ML SOAJ, Take 1 ml under the skin monthly, Disp: 1 mL, Rfl: 11  •  ketorolac (TORADOL) 10 mg tablet, Take 1 tab at onset of headache, Disp: 30 tablet, Rfl: 0  •  latanoprost (XALATAN) 0 005 % ophthalmic solution, 1 drop daily at bedtime, Disp: , Rfl:   •  magnesium oxide (MAG-OX) 400 mg, Take 250 mg by mouth daily, Disp: , Rfl:   •  nortriptyline (PAMELOR) 25 mg capsule, Take 2 capsules (50 mg total) by mouth daily at bedtime, Disp: 180 capsule, Rfl: 2  •  Riboflavin (VITAMIN B-2 PO), Take 1 tablet by mouth daily, Disp: , Rfl:   •  Varenicline Tartrate (Tyrvaya) 0 03 MG/ACT SOLN, 1 spray into each nostril 2 (two) times a day, Disp: , Rfl:   •  venlafaxine 37 5 mg 24 hr tablet, Take 1 tablet (37 5 mg total) by mouth daily with breakfast, Disp: 90 tablet, Rfl: 2  •  Galcanezumab-gnlm (Emgality) 120 MG/ML SOAJ, Take 2 ml under the skin once (Patient not taking: Reported on 11/11/2022), Disp: 2 mL, Rfl: 0  •  rizatriptan (MAXALT) 10 MG tablet, Take 1 tablet (10 mg total) by mouth once as needed for migraine for up to 1 dose May repeat in 2 hours if needed (Patient not taking: No sig reported), Disp: 9 tablet, Rfl: 1  •  topiramate (TOPAMAX) 50 MG tablet, Take 1 tablet (50 mg total) by mouth 2 (two) times a day (Patient not taking: Reported on 11/11/2022), Disp: 120 tablet, Rfl: 2  Allergies   Allergen Reactions   • Sulfa Antibiotics

## 2022-11-13 LAB — BACTERIA UR CULT: ABNORMAL

## 2022-11-14 ENCOUNTER — TELEPHONE (OUTPATIENT)
Dept: FAMILY MEDICINE CLINIC | Facility: CLINIC | Age: 57
End: 2022-11-14

## 2022-11-14 DIAGNOSIS — N30.00 ACUTE CYSTITIS WITHOUT HEMATURIA: Primary | ICD-10-CM

## 2022-11-14 RX ORDER — NITROFURANTOIN 25; 75 MG/1; MG/1
100 CAPSULE ORAL 2 TIMES DAILY
Qty: 10 CAPSULE | Refills: 0 | Status: SHIPPED | OUTPATIENT
Start: 2022-11-14 | End: 2022-11-19

## 2022-11-14 NOTE — TELEPHONE ENCOUNTER
Please giver her info for other rheumatologists:    Dr Cydney Gallardo @ [address] @ [address] [place] [address], [id number] [phone]       Urine culture was positive for infection  Source being e coli  I sent abx to pharmacy for her to complete  Macrobid bid x 5 days  She is to f/u if no improvement

## 2022-11-14 NOTE — TELEPHONE ENCOUNTER
Piotr Patel called [facility]  She was offered next available appointment July 2023  She is asking for another another recommendation? Also pt requesting urine culture results if they are in?   Pt's number is [phone]  Please leave a detailed message per pt's verbal consent

## 2022-11-14 NOTE — TELEPHONE ENCOUNTER
Patient is trying to schedule an appointment with [facility], she currently has a rheumatology appointment scheduled at another practice, but it is not until July 2024  [facility]ology requested our office fax 2 office visits to [phone] prior to being able to schedule an appointment  Info was faxed

## 2022-11-25 ENCOUNTER — TELEPHONE (OUTPATIENT)
Dept: FAMILY MEDICINE CLINIC | Facility: CLINIC | Age: 57
End: 2022-11-25

## 2022-11-25 DIAGNOSIS — B96.20 E. COLI UTI: Primary | ICD-10-CM

## 2022-11-25 DIAGNOSIS — N39.0 E. COLI UTI: Primary | ICD-10-CM

## 2022-11-25 RX ORDER — NITROFURANTOIN 25; 75 MG/1; MG/1
100 CAPSULE ORAL 2 TIMES DAILY
Qty: 14 CAPSULE | Refills: 0 | Status: SHIPPED | OUTPATIENT
Start: 2022-11-25 | End: 2022-12-02

## 2022-11-25 NOTE — TELEPHONE ENCOUNTER
----- Message from [employer]  Tere sent at 11/23/2022 12:45 PM EST -----  Regarding: return of UTI  Contact: Zeyad Gutiérrez,    When I was into see you on 11/11 it was confirmed that I did have a UTI  I finished the medication last week but I am again feeling the same symptoms as before  ie   strong urine  Would I be able to have a refill          Thank you,  Feng Bran

## 2022-12-13 LAB
EXTERNAL HIV SCREEN: NORMAL
HCV AB SER-ACNC: NEGATIVE

## 2022-12-19 ENCOUNTER — OFFICE VISIT (OUTPATIENT)
Dept: FAMILY MEDICINE CLINIC | Facility: CLINIC | Age: 57
End: 2022-12-19

## 2022-12-19 VITALS
HEART RATE: 94 BPM | BODY MASS INDEX: 25.83 KG/M2 | OXYGEN SATURATION: 98 % | WEIGHT: 155 LBS | RESPIRATION RATE: 16 BRPM | SYSTOLIC BLOOD PRESSURE: 124 MMHG | TEMPERATURE: 97.6 F | DIASTOLIC BLOOD PRESSURE: 80 MMHG | HEIGHT: 65 IN

## 2022-12-19 DIAGNOSIS — Z00.00 HEALTHCARE MAINTENANCE: Primary | ICD-10-CM

## 2022-12-19 DIAGNOSIS — E78.2 MIXED HYPERLIPIDEMIA: ICD-10-CM

## 2022-12-19 DIAGNOSIS — N39.0 RECURRENT UTI: ICD-10-CM

## 2022-12-19 RX ORDER — CYCLOSPORINE 0.5 MG/ML
EMULSION OPHTHALMIC
COMMUNITY

## 2022-12-19 RX ORDER — NITROFURANTOIN 25; 75 MG/1; MG/1
100 CAPSULE ORAL 2 TIMES DAILY
Qty: 20 CAPSULE | Refills: 0 | Status: SHIPPED | OUTPATIENT
Start: 2022-12-19 | End: 2022-12-29

## 2022-12-20 NOTE — PROGRESS NOTES
Assessment/Plan: Updated completed  Blood pressure stable  Up-to-date with GYN colonoscopy and mammography  Recheck yearly  Labs reviewed  Health Maintenance  Lifestyle Advice: continue current healthy lifestyle patterns and return for routine annual checkups  Diet: well balanced diet  Exercise: intermittently  Dental: regular dental visits  Vision: no vision problems  Preventative Health: Female Preventative: Exercises regularly  Does monthly breast self examination    No problem-specific Assessment & Plan notes found for this encounter  Diagnoses and all orders for this visit:    Healthcare maintenance    Recurrent UTI  -     Ambulatory Referral to Urology; Future  -     nitrofurantoin (MACROBID) 100 mg capsule; Take 1 capsule (100 mg total) by mouth 2 (two) times a day for 10 days    Mixed hyperlipidemia  -     Lipid panel; Future    Other orders  -     cycloSPORINE (RESTASIS) 0 05 % ophthalmic emulsion; Restasis 0 05 % eye drops in a dropperette                  Subjective:      Patient ID: Zarina Bucio is a 62 y o  female  HM        The following portions of the patient's history were reviewed and updated as appropriate: allergies, current medications, past family history, past medical history, past social history, past surgical history and problem list     Review of Systems   Constitutional: Negative for appetite change, fatigue, fever and unexpected weight change  HENT: Negative for congestion, ear pain, postnasal drip, rhinorrhea, sinus pressure, sinus pain and sore throat  Eyes: Negative for redness and visual disturbance  Respiratory: Negative for chest tightness and shortness of breath  Cardiovascular: Negative for chest pain, palpitations and leg swelling  Gastrointestinal: Negative for abdominal distention, abdominal pain, diarrhea and nausea  Endocrine: Negative for cold intolerance and heat intolerance  Genitourinary: Negative for dysuria and hematuria  Musculoskeletal: Negative for arthralgias, gait problem and myalgias  Skin: Negative for pallor and rash  Neurological: Negative for dizziness, tremors, weakness, light-headedness and headaches  Psychiatric/Behavioral: Negative for behavioral problems  The patient is not nervous/anxious  Objective:    /80 (BP Location: Left arm, Patient Position: Sitting, Cuff Size: Adult)   Pulse 94   Temp 97 6 °F (36 4 °C) (Temporal)   Resp 16   Ht 5' 5" (1 651 m)   Wt 70 3 kg (155 lb)   LMP  (LMP Unknown)   SpO2 98%   BMI 25 79 kg/m²          Physical Exam  Vitals and nursing note reviewed  Constitutional:       General: She is not in acute distress  Appearance: She is not diaphoretic  HENT:      Head: Normocephalic and atraumatic  Eyes:      General: No scleral icterus  Conjunctiva/sclera: Conjunctivae normal       Pupils: Pupils are equal, round, and reactive to light  Neck:      Thyroid: No thyromegaly  Cardiovascular:      Rate and Rhythm: Normal rate and regular rhythm  Pulses:           Carotid pulses are 0 on the right side and 0 on the left side  Heart sounds: Normal heart sounds  No murmur heard  Pulmonary:      Effort: Pulmonary effort is normal  No respiratory distress  Breath sounds: Normal breath sounds  No wheezing  Abdominal:      General: There is no distension  Palpations: Abdomen is soft  There is no mass  Tenderness: There is no abdominal tenderness  Musculoskeletal:         General: Normal range of motion  Cervical back: Neck supple  Right lower leg: No edema  Left lower leg: No edema  Lymphadenopathy:      Cervical: No cervical adenopathy  Skin:     General: Skin is warm  Coloration: Skin is not pale  Neurological:      Mental Status: She is alert and oriented to person, place, and time  Cranial Nerves: No cranial nerve deficit        Coordination: Coordination normal       Deep Tendon Reflexes: Reflexes are normal and symmetric  Psychiatric:         Behavior: Behavior normal          Thought Content: Thought content normal          Judgment: Judgment normal              Sulfa antibiotics    Sanders Apgar Bigley had no medications administered during this visit    Health Maintenance   Topic Date Due   • Hepatitis B Vaccine (1 of 3 - 3-dose series) Never done   • BMI: Followup Plan  Never done   • DTaP,Tdap,and Td Vaccines (1 - Tdap) Never done   • COVID-19 Vaccine (3 - Booster for Lisa series) 2022   • Annual Physical  2022   • Breast Cancer Screening: Mammogram  2023   • Depression Screening  2023   • BMI: Adult  2023   • Cervical Cancer Screening  2024   • Colorectal Cancer Screening  09/15/2026   • HIV Screening  Completed   • Hepatitis C Screening  Completed   • Influenza Vaccine  Completed   • Pneumococcal Vaccine: Pediatrics (0 to 5 Years) and At-Risk Patients (6 to 59 Years)  Aged Out   • HIB Vaccine  Aged Out   • IPV Vaccine  Aged Out   • Hepatitis A Vaccine  Aged Out   • Meningococcal ACWY Vaccine  Aged Out   • HPV Vaccine  Aged Out      Social History     Socioeconomic History   • Marital status: /Civil Union     Spouse name: Not on file   • Number of children: Not on file   • Years of education: Not on file   • Highest education level: Not on file   Occupational History   • Not on file   Tobacco Use   • Smoking status: Never   • Smokeless tobacco: Never   Vaping Use   • Vaping Use: Never used   Substance and Sexual Activity   • Alcohol use: Yes     Comment: social    • Drug use: No   • Sexual activity: Yes     Partners: Male     Birth control/protection: Male Sterilization   Other Topics Concern   • Not on file   Social History Narrative   • Not on file     Social Determinants of Health     Financial Resource Strain: Not on file   Food Insecurity: Not on file   Transportation Needs: Not on file   Physical Activity: Not on file   Stress: Not on file Social Connections: Not on file   Intimate Partner Violence: Not on file   Housing Stability: Not on file      Family History   Problem Relation Age of Onset   • Arthritis Mother    • Ulcerative colitis Mother    • Glaucoma Mother    • Seizures Mother    • Stroke Mother    • Breast cancer Mother 68   • Migraines Mother    • Parkinsonism Mother    • Hypertension Father    • Lymphoma Father         non hodkins   • Raynaud syndrome Daughter    • No Known Problems Daughter    • No Known Problems Daughter    • No Known Problems Maternal Grandmother    • No Known Problems Maternal Grandfather    • No Known Problems Paternal Grandmother    • No Known Problems Paternal Grandfather    • No Known Problems Maternal Aunt    • No Known Problems Paternal Aunt       Past Medical History:   Diagnosis Date   • Amenorrhea    • Anemia    • Cluster headache    • Contact dermatitis     last assessed: 2013   • Crohn's disease ([facility] [address] )     last assessed: 2012  Neg for Crohns based on last GI eval and path  • Degenerative disc disease, cervical    • Folliculitis     last assessed: 2013   • Headache, tension-type    • Hyperlipidemia    • IBS (irritable bowel syndrome)    • Menorrhagia    • Migraine    • Vitamin D deficiency disease     last assessed: 2016      has a past surgical history that includes  section; Dilation and curettage of uterus; pr colonoscopy flx dx w/collj spec when pfrmd (N/A, 9/15/2016); Laser ablation of vascular lesion; Esophagogastroduodenoscopy; and Endometrial ablation     Patient Active Problem List    Diagnosis Date Noted   • Dense breast 2019   • Chronic migraine without aura without status migrainosus, not intractable 2018   • Gastroesophageal reflux disease without esophagitis 2017   • Degenerative cervical disc 2015   • Primary open angle glaucoma (POAG) of both eyes 10/16/2013   • Irritable bowel syndrome 2012       Current Outpatient Medications:   • cholecalciferol (VITAMIN D3) 400 units tablet, Take 400 Units by mouth daily, Disp: , Rfl:   •  Cyanocobalamin (VITAMIN B12 PO), Take 1 tablet by mouth daily, Disp: , Rfl:   •  cycloSPORINE (RESTASIS) 0 05 % ophthalmic emulsion, Restasis 0 05 % eye drops in a dropperette, Disp: , Rfl:   •  dicyclomine (BENTYL) 20 mg tablet, Take 1 tablet (20 mg total) by mouth 3 (three) times a day as needed (abdominal cramping), Disp: 30 tablet, Rfl: 1  •  frovatriptan (FROVA) 2 5 MG tablet, Take 2 5 mg by mouth if needed for migraine If recurs, may repeat after 2 hours  Max of 3 tabs in 24 hours  , Disp: , Rfl:   •  ketorolac (TORADOL) 10 mg tablet, Take 1 tab at onset of headache, Disp: 30 tablet, Rfl: 0  •  latanoprost (XALATAN) 0 005 % ophthalmic solution, 1 drop daily at bedtime, Disp: , Rfl:   •  magnesium oxide (MAG-OX) 400 mg, Take 250 mg by mouth daily, Disp: , Rfl:   •  nitrofurantoin (MACROBID) 100 mg capsule, Take 1 capsule (100 mg total) by mouth 2 (two) times a day for 10 days, Disp: 20 capsule, Rfl: 0  •  nortriptyline (PAMELOR) 25 mg capsule, Take 2 capsules (50 mg total) by mouth daily at bedtime, Disp: 180 capsule, Rfl: 2  •  Riboflavin (VITAMIN B-2 PO), Take 1 tablet by mouth daily, Disp: , Rfl:   •  Varenicline Tartrate (Tyrvaya) 0 03 MG/ACT SOLN, 1 spray into each nostril 2 (two) times a day, Disp: , Rfl:   •  venlafaxine 37 5 mg 24 hr tablet, Take 1 tablet (37 5 mg total) by mouth daily with breakfast, Disp: 90 tablet, Rfl: 2              BMI Counseling: Body mass index is 25 79 kg/m²  The BMI is above normal  Nutrition recommendations include decreasing portion sizes, encouraging healthy choices of fruits and vegetables, decreasing fast food intake, consuming healthier snacks, limiting drinks that contain sugar, reducing intake of saturated and trans fat and reducing intake of cholesterol  Exercise recommendations include exercising 3-5 times per week   Rationale for BMI follow-up plan is due to patient being overweight or obese

## 2023-01-03 ENCOUNTER — OFFICE VISIT (OUTPATIENT)
Dept: NEUROLOGY | Facility: CLINIC | Age: 58
End: 2023-01-03

## 2023-01-03 VITALS — HEIGHT: 65 IN | BODY MASS INDEX: 25.79 KG/M2 | TEMPERATURE: 97.7 F

## 2023-01-03 DIAGNOSIS — G43.709 CHRONIC MIGRAINE WITHOUT AURA WITHOUT STATUS MIGRAINOSUS, NOT INTRACTABLE: ICD-10-CM

## 2023-01-03 RX ORDER — NORTRIPTYLINE HYDROCHLORIDE 25 MG/1
25 CAPSULE ORAL
Qty: 90 CAPSULE | Refills: 2 | Status: SHIPPED | OUTPATIENT
Start: 2023-01-03

## 2023-01-03 RX ORDER — CYPROHEPTADINE HYDROCHLORIDE 4 MG/1
TABLET ORAL
Qty: 30 TABLET | Refills: 0 | Status: SHIPPED | OUTPATIENT
Start: 2023-01-03

## 2023-01-03 NOTE — PROGRESS NOTES
Patient ID: Liza Dumont is a 62 y o  female  Assessment/Plan:    Chronic migraine without aura without status migrainosus, not intractable  Patient with recent improvement in headaches-decreased frequency and severity since start of venlafaxine last visit  She is currently having approximately 2 mild to moderate headaches per week  She does also admit to some weather related headaches on top of this  She did recently wean off of topamax due to side effects with no change in her headaches  She continues to have dry mouth/eyes which we did discuss could be potential side effect of nortriptyline, she is currently following with rheumatology for positive SANDEEP  Plan:  -continue current dose of venlafaxine, b2, magnesium  -reduce nortriptyline to 25 mg qhs-if no improvement in dry mouth over the next month and no change in headache could consider stopping  If her headaches do worsen would increase her venlafaxine  -for abortive can continue use of toradol or frovatriptan as needed  -start cyproheptadine 4 mg qhs prn for weather related headaches      Plan for follow up in 6 months  To contact the office sooner with any concerns or worsening symptoms  Diagnoses and all orders for this visit:    Chronic migraine without aura without status migrainosus, not intractable  -     cyproheptadine (PERIACTIN) 4 mg tablet; Take 1 tab qhs prn for weather related headache  -     nortriptyline (PAMELOR) 25 mg capsule; Take 1 capsule (25 mg total) by mouth daily at bedtime         I have spent 25 minutes with the patient and family/ caregiver and in review of chart/diagnostic studies today  in which greater than 50% of this time was spent in counseling and/or coordination of care regarding diagnoses, test results, impression, plan and potential medication side effects  Subjective:    HPI  Patient is a 65 y/o F who is here as a follow up for chronic migraines  Last office 8/2022 in which she was to trial CGRP  Interval History:  emgality was not covered by her insurance, she was started on low dose venlafaxine  She weaned herself of topamax due to side effects  She did not notice any difference in headaches  Venlafaxine has been helpful for her headaches  She is having about 2 mild to moderate headaches per week which is improve from previous, she can have weather related headaches on top of this  She did feel toradol was helpful as abortive  She really only needs to take abortive medication for her headache 2-3x per month  She tends to really only use for moderate to severe headaches  She was using abortive more previous  She denies any new symptoms with headaches  She did have work up for her dry eye/mouth-did have positive SANDEEP  Did see rheumatology regarding this  Did discuss could be related to TCA  Prior hx   Location: temples , parietal bilaterally     Duration: this week-all day, previous-a few hours     Timing: varies     Description: pounding, feels like head is in a "vice", Rates: severe-8/10, moderate 4-5/10     Associated symptoms: photophobia, phonophobia, vomiting, pins and needles on the temple rarely     She has had facial droop in the past with headaches, but has not had this in 10 years  Aura: none     Triggers: Weather at times, stress     Lifestyle: 2-3 cups of coffee daily, 3-4 bottles of water daily, 6 hours nightly sometimes can have trouble sleeping     Denies vision changes, dizziness, no worsening with coughing/sneezing/exercise/position change       Current medications:  Preventative:   nortriptyline 50 mg 1 tab qhs  venlafaxine  Vitamin b2  Magnesium oxide 400 mg daily        Abortive:  frovatriptan  toradol  motrin        prior medicaitons:  topamax, nortriptyline, magnesium, b2     maxalt  Rizatriptan 10 mg-was changed due to insurance       The following portions of the patient's history were reviewed and updated as appropriate: allergies, current medications, past family history, past medical history, past social history, past surgical history and problem list        Objective:    Temperature 97 7 °F (36 5 °C), temperature source Temporal, height 5' 5" (1 651 m), not currently breastfeeding  Physical Exam  Constitutional:       General: She is awake  HENT:      Right Ear: Hearing normal       Left Ear: Hearing normal    Eyes:      General: Lids are normal       Extraocular Movements: Extraocular movements intact  Pupils: Pupils are equal, round, and reactive to light  Neurological:      Mental Status: She is alert  Motor: Motor strength is normal       Deep Tendon Reflexes:      Reflex Scores:       Bicep reflexes are 2+ on the right side and 2+ on the left side  Brachioradialis reflexes are 2+ on the right side and 2+ on the left side  Patellar reflexes are 2+ on the right side and 2+ on the left side  Achilles reflexes are 2+ on the right side and 2+ on the left side  Psychiatric:         Speech: Speech normal          Neurological Exam  Mental Status  Awake and alert  Oriented to person, place, time and situation  Speech is normal  Language is fluent with no aphasia  Cranial Nerves  CN II: Visual fields full to confrontation  CN III, IV, VI: Extraocular movements intact bilaterally  Normal lids and orbits bilaterally  Pupils equal round and reactive to light bilaterally  CN V:  Right: Facial sensation is normal   Left: Facial sensation is normal on the left  CN VII:  Right: There is no facial weakness  Left: There is no facial weakness  CN VIII:  Right: Hearing is normal   Left: Hearing is normal   CN IX, X: Palate elevates symmetrically  CN XI:  Right: Trapezius strength is normal   Left: Trapezius strength is normal   CN XII: Tongue midline without atrophy or fasciculations  Motor   Strength is 5/5 throughout all four extremities  Sensory  Light touch is normal in upper and lower extremities   Temperature is normal in upper and lower extremities  Reflexes                                            Right                      Left  Brachioradialis                    2+                         2+  Biceps                                 2+                         2+  Patellar                                2+                         2+  Achilles                                2+                         2+    Coordination  Right: Finger-to-nose normal Left: Finger-to-nose normal     Gait  Casual gait is normal including stance, stride, and arm swing  Able to rise from chair without using arms  I have personally reviewed the ROS performed by the MA     ROS:    Review of Systems   Constitutional: Negative  Negative for appetite change and fever  HENT: Negative  Negative for hearing loss, tinnitus, trouble swallowing and voice change  Eyes: Negative  Negative for photophobia, pain and visual disturbance  Respiratory: Negative  Negative for shortness of breath  Cardiovascular: Negative  Negative for palpitations  Gastrointestinal: Negative  Negative for nausea and vomiting  Endocrine: Negative  Negative for cold intolerance  Genitourinary: Negative  Negative for dysuria, frequency and urgency  Musculoskeletal: Negative  Negative for gait problem, myalgias and neck pain  Skin: Negative  Negative for rash  Allergic/Immunologic: Negative  Neurological: Positive for headaches  Negative for dizziness, tremors, seizures, syncope, facial asymmetry, speech difficulty, weakness, light-headedness and numbness  Hematological: Negative  Does not bruise/bleed easily  Psychiatric/Behavioral: Negative  Negative for confusion, hallucinations and sleep disturbance

## 2023-01-03 NOTE — ASSESSMENT & PLAN NOTE
Patient with recent improvement in headaches-decreased frequency and severity since start of venlafaxine last visit  She is currently having approximately 2 mild to moderate headaches per week  She does also admit to some weather related headaches on top of this  She did recently wean off of topamax due to side effects with no change in her headaches  She continues to have dry mouth/eyes which we did discuss could be potential side effect of nortriptyline, she is currently following with rheumatology for positive SANDEEP  Plan:  -continue current dose of venlafaxine, b2, magnesium  -reduce nortriptyline to 25 mg qhs-if no improvement in dry mouth over the next month and no change in headache could consider stopping  If her headaches do worsen would increase her venlafaxine  -for abortive can continue use of toradol or frovatriptan as needed  -start cyproheptadine 4 mg qhs prn for weather related headaches      Plan for follow up in 6 months  To contact the office sooner with any concerns or worsening symptoms

## 2023-01-03 NOTE — PATIENT INSTRUCTIONS
Continue all medications other then changes noted below-  Decrease nortriptyline to 25 mg at bedtime-if dry mouth does not improve and no change in headaches after 1-2 months can stop    Take cyproheptadine as needed at bedtime for weather related headaches

## 2023-01-06 ENCOUNTER — TELEPHONE (OUTPATIENT)
Dept: UROLOGY | Facility: AMBULATORY SURGERY CENTER | Age: 58
End: 2023-01-06

## 2023-01-06 NOTE — TELEPHONE ENCOUNTER
What is the reason for the patient’s appointment? NP- Recurrent UTI's    Patient can be reached at [phone]    What office location does the patient prefer? [place]       Do we accept the patient's insurance or is the patient Self-Pay? CBC    Has the patient had any previous Urologist(s)? No    Have patient records been requested? No    Has the patient had any outside testing done? No    Does the patient have a personal history of cancer?  No

## 2023-01-20 ENCOUNTER — EVALUATION (OUTPATIENT)
Dept: PHYSICAL THERAPY | Facility: MEDICAL CENTER | Age: 58
End: 2023-01-20

## 2023-01-20 DIAGNOSIS — M54.42 ACUTE LEFT-SIDED LOW BACK PAIN WITH LEFT-SIDED SCIATICA: Primary | ICD-10-CM

## 2023-01-20 NOTE — PROGRESS NOTES
PT Evaluation     Today's date: 2023  Patient name: Radames Hernandez  : 1965  MRN: [id number]  Referring provider: Luz Woo, PT  Dx:   Encounter Diagnosis     ICD-10-CM    1  Acute left-sided low back pain with left-sided sciatica  M54 42                      Assessment  Assessment details: Radames Hernandez is a pleasant 62 y o  female who presents with acute L-sided LBP with radiating numbness into the L anterior thigh/posterior calf for the last month  No further referral is necessary at this time based upon examination results  Primary movement impairment is L L4/L5 hypomobility, which contributes to L sciatic neural tension and limits her ability to sit for prolonged periods and roll in bed  Patient also presents with an impaired feedforward mechanism and lack of TA/multifidus co-contraction along with hip girdle weakness, which further contributes to compensatory strain on her lumbar spine when participating in ADL  Patient was educated in an illustrated HEP for lumbar and sciatic neural mobility, and she reported decreased pain post-tx  Patient would benefit from skilled PT services to address the listed impairments to facilitate a return to PLOF  Impairments: abnormal muscle firing, abnormal muscle tone, abnormal or restricted ROM, abnormal movement, activity intolerance, impaired physical strength, lacks appropriate home exercise program and pain with function  Functional limitations: sitting, bed mobility  Symptom irritability: lowBarriers to therapy: none  Understanding of Dx/Px/POC: good   Prognosis: good  Prognosis details: Positive prognostic factors include positive attitude towards recovery  No negative prognostic factors  Goals  STG:  Patient will be independent with home exercise program   Patient will demonstrate increased ROM on L passive SLR test to indicate decreased neural tension    LTG:  Patient will demonstrate increased L L4/L5 lumbar joint play to be able to roll   Patient will be able to perform proper TA contraction without compensation to be able to sit for longer periods  Patient will be able to manage symptoms independently  Plan  Plan details: Prognosis is above given PT services 1x/week for the next 4 weeks and given HEP adherence  Patient would benefit from: skilled physical therapy  Referral necessary: No  Planned modality interventions: cryotherapy and thermotherapy: hydrocollator packs  Planned therapy interventions: activity modification, body mechanics training, flexibility, functional ROM exercises, graded exercise, graded activity, home exercise program, joint mobilization, manual therapy, massage, Mtz taping, neuromuscular re-education, patient education, strengthening, stretching, therapeutic activities and therapeutic exercise  Frequency: 1x week  Duration in weeks: 4  Treatment plan discussed with: patient        Subjective Evaluation    History of Present Illness  Mechanism of injury: This is a 62 y o  female presenting with L-sided LBP for the last month  She reports that she is working at [employer] and was working with a student, and the student went to lower themselves to the ground while holding her hand  She reports that she had some discomfort in her upper back at the time and later developed pain in the L side of her low back  She also has a sensation of heaviness into the front of her L thigh along with numbness in her L thigh and into her L calf when sitting  She does have a hx of LBP, but this current episode is not similar to her previous episodes  She reports no bowel/bladder changes, no weakness in her legs, no tripping over objects, no nausea/vomiting              Not a recurrent problem   Quality of life: good    Pain  Current pain ratin  At best pain rating: 3  At worst pain ratin  Location: L side of low back/radiation into L anterior thigh/posterior calf  Quality: tight (heaviness)  Relieving factors: medications (Advil)  Aggravating factors: sitting    Social Support    Employment status: working    Diagnostic Tests  No diagnostic tests performed  Treatments  Previous treatment: chiropractic  Patient Goals  Patient goals for therapy: decreased pain, increased motion and independence with ADLs/IADLs  Patient goal: to be able to roll in bed, to wake up with less pain, to be able to sit        Objective     Concurrent Complaints  Negative for bladder dysfunction and bowel dysfunction    Additional Special Questions  No weakness in the foot/LE    Palpation   Left   Hypertonic in the erector spinae, lumbar paraspinals and quadratus lumborum  Tenderness of the erector spinae, lumbar paraspinals and quadratus lumborum  Neurological Testing     Sensation     Lumbar   Left   Intact: light touch    Right   Intact: light touch    Reflexes   Left   Patellar (L4): normal (2+)  Achilles (S1): trace (1+)  Clonus sign: negative    Right   Patellar (L4): normal (2+)  Achilles (S1): trace (1+)  Clonus sign: negative    Active Range of Motion     Lumbar   Flexion:  with pain Restriction level: moderate  Extension:  with pain Restriction level: moderate  Left rotation:  WFL  Right rotation:  [facility] [place]  Left Hip   External rotation (90/90): [facility] [place]  Internal rotation (90/90): WFL    Right Hip   External rotation (90/90): [facility] [place]  Internal rotation (90/90): [facility] [place]    Additional Active Range of Motion Details  No change with repeated standing lumbar FLX/EXT AROM testing     Passive Range of Motion   Left Hip   External rotation (90/90): [facility] [place]  External rotation (prone): [facility] [place]  Internal rotation (90/90): [facility] [place]  Internal rotation (prone): WFL    Right Hip   External rotation (90/90): [facility] [place]  External rotation (prone): [facility] [place]  Internal rotation (90/90):  [facility] [place]  Internal rotation (prone): [facility] [place]    Joint Play   Joints within functional limits: L1, L2 and L3     Hypomobile: L4, L5 and S1     Strength/Myotome Testing     Lumbar   Left   Heel walk: normal  Toe walk: normal    Right   Heel walk: normal  Toe walk: normal    Left Hip   Planes of Motion   Flexion: 4+  Extension: 3+  Abduction: 3+    Right Hip   Planes of Motion   Flexion: 4+  Extension: 3+  Abduction: 3+    Left Knee   Flexion: 5  Extension: 5    Right Knee   Flexion: 5  Extension: 5    Left Ankle/Foot   Dorsiflexion: 5  Plantar flexion: 5  Great toe extension: 4+    Right Ankle/Foot   Dorsiflexion: 5  Plantar flexion: 5  Great toe extension: 5    Muscle Activation     Additional Muscle Activation Details  Difficulty activating bilateral TA    Tests     Lumbar   Positive SIJ compression  Negative sacroiliac distraction  Left   Positive passive SLR  Right   Negative passive SLR  Left Pelvic Girdle/Sacrum   Positive: active SLR test      Right Pelvic Girdle/Sacrum   Negative: active SLR test      Left Hip   Negative NAMRATA, FADIR and scour  Right Hip   Negative NAMRATA, FADIR and scour       General Comments:      Hip Comments   Decreased L-sided LBP during lumbar FLX/EXT AROM testing after L L4/L5 UPA/transverse glides             Precautions: none    HEP: SKTC/supine sciatic nerve glides/SKTC  Manuals 1/20            L L4/L5 UPA             L L4/L5 transverse glide             Lumbar PAs                          Neuro Re-Ed             Supine sciatic nerve glides x10 HEP            TA activation              Bridges             Multifidus press                                                                 Ther Ex             SKTC 10"x10 HEP            LTR             Supine clams             Supine hip ADD isometrics             Seated pball rollouts                                                    Ther Activity                                       Gait Training                                       Modalities             MHP lumbar

## 2023-01-24 ENCOUNTER — OFFICE VISIT (OUTPATIENT)
Dept: PHYSICAL THERAPY | Facility: MEDICAL CENTER | Age: 58
End: 2023-01-24

## 2023-01-24 DIAGNOSIS — M54.42 ACUTE LEFT-SIDED LOW BACK PAIN WITH LEFT-SIDED SCIATICA: Primary | ICD-10-CM

## 2023-01-24 NOTE — PROGRESS NOTES
Daily Note     Today's date: 2023  Patient name: Apollo Lai  : 1965  MRN: [id number]  Referring provider: Marialuisa Guardado, PT  Dx:   Encounter Diagnosis     ICD-10-CM    1  Acute left-sided low back pain with left-sided sciatica  M54 42                      Subjective: Patient reports that she felt relief after the initial evaluation, and she had less pain in her back and her leg when waking up the following day  She notes that this relief lasted for a few days, but she has some pain in the back of her L thigh/glute today  Objective: See treatment diary below      Assessment: Continued with manual interventions to address L lower lumbar hypomobility  L posterior thigh pain was reproduced with L L4/L5 UPA/transverse glide  Patient reported decreased intensity of L posterior thigh pain when ambulating after manuals  Reviewed HEP of SKTC/supine sciatic nerve glides/SKTC with cueing provided to prevent prolonged hold in DF  Patient reported further reduction in symptom intensity when ambulating post-tx  Patient tolerated treatment well and would benefit from continued PT to address the listed impairments to maximize function  Plan: Continue per plan of care  Precautions: none    HEP: SKTC/supine sciatic nerve glides/SKTC  Manuals            L L4/L5 UPA  KP Gr  III           L L4/L5 transverse glide  KP Gr  III           Lumbar PAs  KP Gr   III                        Neuro Re-Ed             Supine sciatic nerve glides x10 HEP 2x10           TA activation              Bridges             Multifidus press                                                                 Ther Ex             SKTC 10"x10 HEP 10"x10 (x2)           LTR             Supine clams             Supine hip ADD isometrics             Seated pball rollouts                                                    Ther Activity                                       Gait Training Modalities             MHP lumbar  10' seated pre

## 2023-01-31 ENCOUNTER — OFFICE VISIT (OUTPATIENT)
Dept: PHYSICAL THERAPY | Facility: MEDICAL CENTER | Age: 58
End: 2023-01-31

## 2023-01-31 DIAGNOSIS — M54.42 ACUTE LEFT-SIDED LOW BACK PAIN WITH LEFT-SIDED SCIATICA: Primary | ICD-10-CM

## 2023-01-31 NOTE — PROGRESS NOTES
Daily Note     Today's date: 2023  Patient name: Destiney Wei  : 1965  MRN: [id number]  Referring provider: Ashok Lan, PT  Dx:   Encounter Diagnosis     ICD-10-CM    1  Acute left-sided low back pain with left-sided sciatica  M54 42                      Subjective: Patient reports that she had some pain in her L leg 2 days ago, but she notes that she was spending a lot more time on her feet that day  She reports that she has been feeling better overall with less numbness and less pain over the past few weeks  Objective: See treatment diary below      Assessment: Continued with manual interventions to address L lumbar hypomobility with patient demonstrating improved joint play post-tx  She also reported decreased stiffness when ambulating after manuals  Patient reported no paresthesias and no radiating pain in her LE today pre or post-tx  Cueing provided for proper speed during supine sciatic nerve glides  Patient tolerated treatment well  Patient would benefit from continued PT to improve lumbar and neural mobility to prevent recurrence of symptoms during ADL  Plan: Continue per plan of care  Precautions: none    HEP: SKTC/supine sciatic nerve glides/SKTC  Manuals           L L4/L5 UPA  KP Gr  III KP Gr  III          L L4/L5 transverse glide  KP Gr  III KP Gr  III          Lumbar PAs  KP Gr  III KP Gr   III                       Neuro Re-Ed             Supine sciatic nerve glides x10 HEP 2x10 2x10          TA activation              Bridges             Multifidus press                                                                 Ther Ex             SKTC 10"x10 HEP 10"x10 (x2) 10"x10 (x2)          LTR             Supine clams             Supine hip ADD isometrics             Seated pball rollouts                                                    Ther Activity                                       Gait Training                                       Modalities MHP lumbar  10' seated pre 10' seated pre

## 2023-02-03 ENCOUNTER — OFFICE VISIT (OUTPATIENT)
Dept: UROLOGY | Facility: AMBULATORY SURGERY CENTER | Age: 58
End: 2023-02-03

## 2023-02-03 VITALS
DIASTOLIC BLOOD PRESSURE: 80 MMHG | WEIGHT: 155 LBS | BODY MASS INDEX: 25.79 KG/M2 | HEART RATE: 80 BPM | SYSTOLIC BLOOD PRESSURE: 132 MMHG

## 2023-02-03 DIAGNOSIS — N39.0 RECURRENT UTI: ICD-10-CM

## 2023-02-03 DIAGNOSIS — G43.709 CHRONIC MIGRAINE WITHOUT AURA WITHOUT STATUS MIGRAINOSUS, NOT INTRACTABLE: Primary | ICD-10-CM

## 2023-02-03 LAB
AMORPH URATE CRY URNS QL MICRO: ABNORMAL
BACTERIA UR QL AUTO: ABNORMAL /HPF
BILIRUB UR QL STRIP: NEGATIVE
CLARITY UR: ABNORMAL
COLOR UR: YELLOW
GLUCOSE UR STRIP-MCNC: NEGATIVE MG/DL
HGB UR QL STRIP.AUTO: NEGATIVE
KETONES UR STRIP-MCNC: NEGATIVE MG/DL
LEUKOCYTE ESTERASE UR QL STRIP: ABNORMAL
NITRITE UR QL STRIP: POSITIVE
NON-SQ EPI CELLS URNS QL MICRO: ABNORMAL /HPF
PH UR STRIP.AUTO: 7 [PH]
POST-VOID RESIDUAL VOLUME, ML POC: 48 ML
PROT UR STRIP-MCNC: NEGATIVE MG/DL
RBC #/AREA URNS AUTO: ABNORMAL /HPF
SL AMB  POCT GLUCOSE, UA: NORMAL
SL AMB LEUKOCYTE ESTERASE,UA: NORMAL
SL AMB POCT BILIRUBIN,UA: NORMAL
SL AMB POCT BLOOD,UA: NORMAL
SL AMB POCT CLARITY,UA: NORMAL
SL AMB POCT COLOR,UA: YELLOW
SL AMB POCT KETONES,UA: NORMAL
SL AMB POCT NITRITE,UA: NORMAL
SL AMB POCT PH,UA: 5
SL AMB POCT SPECIFIC GRAVITY,UA: 1.02
SL AMB POCT URINE PROTEIN: NORMAL
SL AMB POCT UROBILINOGEN: 0.2
SP GR UR STRIP.AUTO: 1.01 (ref 1–1.03)
UROBILINOGEN UR STRIP-ACNC: <2 MG/DL
WBC #/AREA URNS AUTO: ABNORMAL /HPF

## 2023-02-03 RX ORDER — VENLAFAXINE HYDROCHLORIDE 75 MG/1
75 CAPSULE, EXTENDED RELEASE ORAL DAILY
Qty: 90 CAPSULE | Refills: 2 | Status: SHIPPED | OUTPATIENT
Start: 2023-02-03

## 2023-02-03 RX ORDER — ESTRADIOL 0.1 MG/G
1 CREAM VAGINAL 3 TIMES WEEKLY
Qty: 42.5 G | Refills: 1 | Status: SHIPPED | OUTPATIENT
Start: 2023-02-03

## 2023-02-03 NOTE — PROGRESS NOTES
2/3/2023    Delonte Roldan  1965  [id number]        Assessment  -Recurrent urinary tract infections     Discussion/Plan  Chuck Gunderson is a 62 y o  female who presents in consultation     1  Recurrent urinary tract infections- urine dip in the office today is positive for nitrites and blood  We discussed prescribing an antibiotic, but she wishes to wait for results of final urine culture and sensitivity  Plan to call patient with results  PVR assessment is 48 mL  We discussed trialing vaginal estrogen cream and reviewed mechanism of action, potential side effects, and administration instructions  She does note that her mother was recently diagnosed with breast cancer and I reviewed risks of estrogen which she verbalizes understanding  Patient will discuss with her GYN  We will obtain a renal ultrasound to rule out any anatomical cause of her symptoms  Reviewed dietary and behavioral modifications and encourage patient to increase water intake and begin trialing daily probiotic  We also discussed the symptoms of acute urinary tract infection versus colonization of bacteria in the bladder  Call with results of urine testing and renal ultrasound  We will continue to treat UTIs as needed  Plan to follow-up in 3 months for reevaluation  Patient was advised to call sooner with any questions or issues     -All questions answered, patients agree with plan     History of Present Illness  62 y o  female who presents in consultation today for evaluation of recurrent urinary tract infections  Patient referred by her PCP  She reports symptoms of increased urinary frequency and odorous/cloudy urine since April 2022  Patient has been treated for recurrent urinary tract infections by PCP and GYN  Her last urinary tract infection treated in November 2022 for E  coli organism  Patient states she is currently symptomatic  She denies any symptoms of gross hematuria, dysuria, flank pain, fever, or chills    Patient reports a strong family history of kidney stones  She states she underwent cystoscopy by her PCP many years ago and was found to have irritation to the bladder  Patient primarily hydrates with water throughout the day  She denies any correlation of symptoms with sexual activity  Patient otherwise denies any additional neurologic history, surgical intervention, or instrumentation  Review of Systems  Review of Systems   Constitutional: Negative  HENT: Negative  Respiratory: Negative  Cardiovascular: Negative  Gastrointestinal: Negative  Genitourinary: Positive for frequency  Negative for decreased urine volume, difficulty urinating, dysuria, flank pain, hematuria and urgency  Musculoskeletal: Negative  Skin: Negative  Neurological: Negative  Psychiatric/Behavioral: Negative  Past Medical History  Past Medical History:   Diagnosis Date   • Amenorrhea    • Anemia    • Cluster headache    • Contact dermatitis     last assessed: 2013   • Crohn's disease ([facility] [address] )     last assessed: 2012  Neg for Crohns based on last GI eval and path  • Degenerative disc disease, cervical    • Folliculitis     last assessed: 2013   • Headache, tension-type    • Hyperlipidemia    • IBS (irritable bowel syndrome)    • Menorrhagia    • Migraine    • Vitamin D deficiency disease     last assessed: 2016       Past Social History  Past Surgical History:   Procedure Laterality Date   •  SECTION     • DILATION AND CURETTAGE OF UTERUS     • ENDOMETRIAL ABLATION     • ESOPHAGOGASTRODUODENOSCOPY     • LASER ABLATION OF VASCULAR LESION     • CA COLONOSCOPY FLX DX W/COLLJ SPEC WHEN PFRMD N/A 9/15/2016    Procedure: COLONOSCOPY;  Surgeon: Jorge Venegas DO;  Location: [facility] GI [facility];   Service: Gastroenterology       Past Family History  Family History   Problem Relation Age of Onset   • Arthritis Mother    • Ulcerative colitis Mother    • Glaucoma Mother    • Seizures Mother    • Stroke Mother    • Breast cancer Mother 68   • Migraines Mother    • Parkinsonism Mother    • Hypertension Father    • Lymphoma Father         non hodkins   • Raynaud syndrome Daughter    • No Known Problems Daughter    • No Known Problems Daughter    • No Known Problems Maternal Grandmother    • No Known Problems Maternal Grandfather    • No Known Problems Paternal Grandmother    • No Known Problems Paternal Grandfather    • No Known Problems Maternal Aunt    • No Known Problems Paternal Aunt        Past Social history  Social History     Socioeconomic History   • Marital status: /Civil Union     Spouse name: Not on file   • Number of children: Not on file   • Years of education: Not on file   • Highest education level: Not on file   Occupational History   • Not on file   Tobacco Use   • Smoking status: Never   • Smokeless tobacco: Never   Vaping Use   • Vaping Use: Never used   Substance and Sexual Activity   • Alcohol use: Yes     Comment: social    • Drug use: No   • Sexual activity: Yes     Partners: Male     Birth control/protection: Male Sterilization   Other Topics Concern   • Not on file   Social History Narrative   • Not on file     Social Determinants of Health     Financial Resource Strain: Not on file   Food Insecurity: Not on file   Transportation Needs: Not on file   Physical Activity: Not on file   Stress: Not on file   Social Connections: Not on file   Intimate Partner Violence: Not on file   Housing Stability: Not on file       Current Medications  Current Outpatient Medications   Medication Sig Dispense Refill   • cholecalciferol (VITAMIN D3) 400 units tablet Take 400 Units by mouth daily     • Cyanocobalamin (VITAMIN B12 PO) Take 1 tablet by mouth daily     • cycloSPORINE (RESTASIS) 0 05 % ophthalmic emulsion Restasis 0 05 % eye drops in a dropperette     • cyproheptadine (PERIACTIN) 4 mg tablet Take 1 tab qhs prn for weather related headache 30 tablet 0   • dicyclomine (BENTYL) 20 mg tablet Take 1 tablet (20 mg total) by mouth 3 (three) times a day as needed (abdominal cramping) 30 tablet 1   • frovatriptan (FROVA) 2 5 MG tablet Take 2 5 mg by mouth if needed for migraine If recurs, may repeat after 2 hours  Max of 3 tabs in 24 hours  • ketorolac (TORADOL) 10 mg tablet Take 1 tab at onset of headache 30 tablet 0   • latanoprost (XALATAN) 0 005 % ophthalmic solution 1 drop daily at bedtime     • magnesium oxide (MAG-OX) 400 mg Take 250 mg by mouth daily     • nortriptyline (PAMELOR) 25 mg capsule Take 1 capsule (25 mg total) by mouth daily at bedtime 90 capsule 2   • Riboflavin (VITAMIN B-2 PO) Take 1 tablet by mouth daily     • Varenicline Tartrate (Tyrvaya) 0 03 MG/ACT SOLN 1 spray into each nostril 2 (two) times a day     • venlafaxine 37 5 mg 24 hr tablet Take 1 tablet (37 5 mg total) by mouth daily with breakfast 90 tablet 2     No current facility-administered medications for this visit  Allergies  Allergies   Allergen Reactions   • Sulfa Antibiotics        Past medical history, social history, family history, medications and allergies were reviewed  Vitals  There were no vitals filed for this visit  Physical Exam  Physical Exam  Constitutional:       Appearance: Normal appearance  She is well-developed  HENT:      Head: Normocephalic  Eyes:      Pupils: Pupils are equal, round, and reactive to light  Pulmonary:      Effort: Pulmonary effort is normal    Abdominal:      Palpations: Abdomen is soft  Tenderness: There is no right CVA tenderness or left CVA tenderness  Musculoskeletal:         General: Normal range of motion  Cervical back: Normal range of motion  Skin:     General: Skin is warm and dry  Neurological:      General: No focal deficit present  Mental Status: She is alert and oriented to person, place, and time  Psychiatric:         Mood and Affect: Mood normal          Behavior: Behavior normal          Thought Content:  Thought content normal  Judgment: Judgment normal          Results    I have personally reviewed all pertinent lab results and reviewed with patient  Lab Results   Component Value Date    K 4 5 12/28/2018    CO2 20 12/28/2018     12/28/2018    BUN 18 12/28/2018    CREATININE 0 77 12/28/2018     Lab Results   Component Value Date    WBC 5 8 11/10/2018    HGB 12 9 11/10/2018    HCT 39 1 11/10/2018    MCV 88 11/10/2018     11/10/2018     No results found for this or any previous visit (from the past 1 hour(s))

## 2023-02-05 LAB — BACTERIA UR CULT: ABNORMAL

## 2023-02-06 ENCOUNTER — TELEPHONE (OUTPATIENT)
Dept: UROLOGY | Facility: AMBULATORY SURGERY CENTER | Age: 58
End: 2023-02-06

## 2023-02-06 ENCOUNTER — TELEPHONE (OUTPATIENT)
Dept: OBGYN CLINIC | Facility: CLINIC | Age: 58
End: 2023-02-06

## 2023-02-06 DIAGNOSIS — N39.0 URINARY TRACT INFECTION WITHOUT HEMATURIA, SITE UNSPECIFIED: Primary | ICD-10-CM

## 2023-02-06 RX ORDER — CEPHALEXIN 500 MG/1
500 CAPSULE ORAL EVERY 12 HOURS SCHEDULED
Qty: 14 CAPSULE | Refills: 0 | Status: SHIPPED | OUTPATIENT
Start: 2023-02-06 | End: 2023-02-13

## 2023-02-06 NOTE — TELEPHONE ENCOUNTER
[facility] [address] Clinical  Patient recently seen in consultation  [facility] inform patient final culture positive for infection   Prescription for Keflex sent to her pharmacy  Hank Pedroza to follow up as scheduled  LMOM for pt to call office back to discuss results  No consent on file  Office number provided

## 2023-02-06 NOTE — TELEPHONE ENCOUNTER
Pt called to return missed call and requesting c/b with results     Pt call [phone]

## 2023-02-06 NOTE — TELEPHONE ENCOUNTER
Called Katlyn Simpson and notified her that Charles Hernández was sent to [address] [place] [address] - should hydrate with water also

## 2023-02-06 NOTE — TELEPHONE ENCOUNTER
----- Message from Sanders Apgar Bigley sent at 2023  2:15 PM EST -----  Regarding: Question about new med  Contact: [phone]  Good Afternoon,   I recently was seen by Adrianne Hardwick at [facility] for recurrent UTI  She prescribed Estradiol to be used 3 times a week  I'm just checking to make sure that this is safe to use given my mother's Breast Cancer diagnosis          Thank you,   Joel Alex

## 2023-02-07 DIAGNOSIS — G43.709 CHRONIC MIGRAINE WITHOUT AURA WITHOUT STATUS MIGRAINOSUS, NOT INTRACTABLE: Primary | ICD-10-CM

## 2023-02-07 RX ORDER — FROVATRIPTAN SUCCINATE 2.5 MG/1
2.5 TABLET, FILM COATED ORAL AS NEEDED
Qty: 9 TABLET | Refills: 1 | Status: SHIPPED | OUTPATIENT
Start: 2023-02-07

## 2023-02-07 NOTE — TELEPHONE ENCOUNTER
----- Message from Ruben Diehl RN sent at 2/7/2023 10:14 AM EST -----  Regarding: FW: Change of medication  Contact: [phone]    ----- Message -----  From: Liane Patel  Sent: 2/6/2023   9:42 PM EST  To: Neurology [address] Clinical Team 5  Subject: Change of medication                             I should have requested this when I emailed before but it slipped my mind  Could you send in a refill for the frovatriptan 2 5mg?        Thank you ,  Zarina Contreras

## 2023-02-10 ENCOUNTER — OFFICE VISIT (OUTPATIENT)
Dept: PHYSICAL THERAPY | Facility: MEDICAL CENTER | Age: 58
End: 2023-02-10

## 2023-02-10 DIAGNOSIS — M54.42 ACUTE LEFT-SIDED LOW BACK PAIN WITH LEFT-SIDED SCIATICA: Primary | ICD-10-CM

## 2023-02-10 NOTE — PROGRESS NOTES
Daily Note     Today's date: 2/10/2023  Patient name: Isra Meredith  : 1965  MRN: [id number]  Referring provider: Gladis Adams, PT  Dx:   Encounter Diagnosis     ICD-10-CM    1  Acute left-sided low back pain with left-sided sciatica  M54 42                      Subjective: Patient reports that she feels a lot of relief after each PT session  She notes that she had some pain down her L leg last  () after being on her feet for long periods of time shopping  She reports that this pain subsided over the next few days after doing her exercises at home  She feels that she is progressing overall and is able to do more lifting and household chores  Objective: See treatment diary below      Assessment: Continued with manual interventions to address L lumbar hypomobility with patient demonstrating improved joint play and reporting decreased stiffness when ambulating post-tx  Cueing provided to maintain neutral hip position during supine nerve glides  Added LTR to HEP to further improve lumbar mobility  Patient tolerated treatment well and reported alleviation of tightness at the end of session  Patient would benefit from continued PT to maximize independence with HEP prior to upcoming d/c       Plan: Continue per plan of care  Potential d/c next visit  Precautions: none    HEP: SKTC/supine sciatic nerve glides/SKTC  Manuals  2/10         L L4/L5 UPA  KP Gr  III KP Gr  III KP Gr  III         L L4/L5 transverse glide  KP Gr  III KP Gr  III KP Gr  III         Lumbar PAs  KP Gr  III KP Gr  III KP Gr   III                      Neuro Re-Ed             Supine sciatic nerve glides x10 HEP 2x10 2x10 2x10         TA activation              Bridges             Multifidus press                                                                 Ther Ex             SKTC 10"x10 HEP 10"x10 (x2) 10"x10 (x2) 10"x10 (x2)         LTR    5"x10         Supine clams             Supine hip ADD isometrics Seated pball rollouts                                                    Ther Activity                                       Gait Training                                       Modalities             MHP lumbar  10' seated pre 10' seated pre 10' seated pre

## 2023-02-16 ENCOUNTER — OFFICE VISIT (OUTPATIENT)
Dept: PHYSICAL THERAPY | Facility: MEDICAL CENTER | Age: 58
End: 2023-02-16

## 2023-02-16 DIAGNOSIS — M54.42 ACUTE LEFT-SIDED LOW BACK PAIN WITH LEFT-SIDED SCIATICA: Primary | ICD-10-CM

## 2023-02-16 NOTE — PROGRESS NOTES
Discharge Summary     Today's date: 2023  Patient name: Haley Doss  : 1965  MRN: [id number]  Referring provider: Joel Sargent, PT  Dx:   Encounter Diagnosis     ICD-10-CM    1  Acute left-sided low back pain with left-sided sciatica  M54 42                      Subjective: Patient reports that her back is continuing to feel better with less pain and an improved ability to perform her household tasks  She reports that she has occasional soreness at times, but her exercises help her to manage her pain  She is pleased with her overall progress and feels ready to be discharged to her home program       Objective: See treatment diary below      Assessment: Patient has demonstrated excellent progress with improving lumbar joint mobility since her initial visit, which has reduced her sciatic neural tension and thus increased her sitting tolerance  In addition, improvements in lumbar ROM and neural mobility have facilitated a full return to bending, lifting, and twisting during household chores without limitation  Patient has met all of her goals for PT and has reached her maximum benefit from formal PT services  She is independent in an HEP for continued lumbopelvic mobility and is now discharged to her HEP  Plan: Discharge to HEP  Precautions: none    HEP: SKTC/supine sciatic nerve glides/SKTC  Manuals 1/20 1/24 1/31 2/10 2/16        L L4/L5 UPA  KP Gr  III KP Gr  III KP Gr  III KP Gr  III        L L4/L5 transverse glide  KP Gr  III KP Gr  III KP Gr  III KP Gr  III        Lumbar PAs  KP Gr  III KP Gr  III KP Gr  III KP Gr   III                     Neuro Re-Ed             Supine sciatic nerve glides x10 HEP 2x10 2x10 2x10 2x10        TA activation              Bridges             Multifidus press                                                                 Ther Ex             SKTC 10"x10 HEP 10"x10 (x2) 10"x10 (x2) 10"x10 (x2) 10"x10 (x2)        LTR    5"x10 5"x10        Supine clams Supine hip ADD isometrics             Seated pball rollouts                                                    Ther Activity                                       Gait Training                                       Modalities             MHP lumbar  10' seated pre 10' seated pre 10' seated pre 10' seated pre

## 2023-02-23 ENCOUNTER — PATIENT MESSAGE (OUTPATIENT)
Dept: UROLOGY | Facility: AMBULATORY SURGERY CENTER | Age: 58
End: 2023-02-23

## 2023-02-23 DIAGNOSIS — N39.0 RECURRENT UTI: Primary | ICD-10-CM

## 2023-02-27 ENCOUNTER — TELEPHONE (OUTPATIENT)
Dept: UROLOGY | Facility: AMBULATORY SURGERY CENTER | Age: 58
End: 2023-02-27

## 2023-02-27 DIAGNOSIS — N39.0 URINARY TRACT INFECTION WITHOUT HEMATURIA, SITE UNSPECIFIED: Primary | ICD-10-CM

## 2023-02-27 RX ORDER — CEPHALEXIN 500 MG/1
500 CAPSULE ORAL EVERY 12 HOURS SCHEDULED
Qty: 10 CAPSULE | Refills: 0 | Status: SHIPPED | OUTPATIENT
Start: 2023-02-27 | End: 2023-03-04

## 2023-02-27 NOTE — TELEPHONE ENCOUNTER
Called and spoke with pt  Pt stated she has urgency, frequency, and burning  Pt stated she also has fowl smelling urine

## 2023-02-27 NOTE — TELEPHONE ENCOUNTER
----- Message from [id number] Toan Collazo sent at 2/27/2023  3:11 PM EST -----  Received results of recent urine culture which is positive for infection  Please ask patient if she is symptomatic

## 2023-03-27 ENCOUNTER — PATIENT MESSAGE (OUTPATIENT)
Dept: UROLOGY | Facility: AMBULATORY SURGERY CENTER | Age: 58
End: 2023-03-27

## 2023-03-27 DIAGNOSIS — N39.0 RECURRENT UTI: Primary | ICD-10-CM

## 2023-03-30 ENCOUNTER — TELEPHONE (OUTPATIENT)
Dept: UROLOGY | Facility: AMBULATORY SURGERY CENTER | Age: 58
End: 2023-03-30

## 2023-03-30 DIAGNOSIS — N39.0 URINARY TRACT INFECTION WITHOUT HEMATURIA, SITE UNSPECIFIED: Primary | ICD-10-CM

## 2023-03-30 NOTE — TELEPHONE ENCOUNTER
----- Message from [id number] Toan Collazo sent at 3/30/2023 12:13 PM EDT -----  Received results of recent urine culture which is positive for infection  Please ask patient if she is symptomatic  LMOVM requesting pt to call back  Office number provided

## 2023-03-31 ENCOUNTER — TELEPHONE (OUTPATIENT)
Dept: UROLOGY | Facility: AMBULATORY SURGERY CENTER | Age: 58
End: 2023-03-31

## 2023-03-31 RX ORDER — CEPHALEXIN 500 MG/1
500 CAPSULE ORAL EVERY 12 HOURS SCHEDULED
Qty: 10 CAPSULE | Refills: 0 | Status: SHIPPED | OUTPATIENT
Start: 2023-03-31 | End: 2023-04-05

## 2023-03-31 NOTE — TELEPHONE ENCOUNTER
----- Message from [id number] Toan Collazo sent at 3/30/2023 12:13 PM EDT -----  Received results of recent urine culture which is positive for infection  Please ask patient if she is symptomatic  Spoke with pt and she stated she is having symptoms and AZO isn't helping  I advised pt Keflex was sent to pharmacy for her (per most recent note)  Pt in agreement to begin antibx and had no questions

## 2023-04-26 ENCOUNTER — HOSPITAL ENCOUNTER (OUTPATIENT)
Dept: ULTRASOUND IMAGING | Facility: MEDICAL CENTER | Age: 58
Discharge: HOME | End: 2023-04-26

## 2023-04-26 DIAGNOSIS — N39.0 RECURRENT UTI: ICD-10-CM

## 2023-05-04 NOTE — PROGRESS NOTES
5/5/2023    Frank Pinedo  1965  [id number]        Assessment  -Recurrent urinary tract infections    Discussion/Plan  Karen Boston is a 62 y o  female being managed by our office    1  Recurrent urinary tract infections-urine dip in the office that appears negative for infection or blood  PVR assessment is 0 mL  We reviewed the symptoms of acute infection versus colonization of bacteria in the bladder  Recommend continuing to treat UTIs as needed  The results of her recent renal ultrasound were unremarkable  Provided patient with standing orders for urine culture  She will remain on Hiprex 1 g twice daily, vaginal Estrace 3 times per week, probiotic and cranberry supplements daily  Encouraged patient to increase water intake  We also discussed how IBS flare can exacerbate her urinary symptoms  Follow-up in 6 months for reevaluation  She was advised to call sooner with any questions or issues     -All questions answered, patients agree with plan      History of Present Illness  62 y o  female with a history of recurrent UTI presents today for follow up  Patient seen in consultation in February 2023  She has a history of recurrent urinary tract infections, symptoms including increased urinary frequency, and odorous and cloudy urine  Her last positive UTI was on 3/30/2023 for E  coli organism  Renal ultrasound from 4/26/2023 identified bilateral, tiny nonobstructing calculi measuring up to 2 mm in size and bilateral simple renal cysts  Patient has since been started on Hiprex a few weeks ago and takes twice daily  She also uses vaginal Estrace cream 3 times per week  Patient states she is currently asymptomatic  She denies any episodes of gross hematuria, dysuria, or flank pain  Patient continues to take OTC probiotic and cranberry supplements daily  Review of Systems  Review of Systems   Constitutional: Negative  HENT: Negative  Respiratory: Negative  Cardiovascular: Negative  Gastrointestinal: Negative  Genitourinary: Negative for decreased urine volume, difficulty urinating, dysuria, flank pain, frequency, hematuria and urgency  Musculoskeletal: Negative  Skin: Negative  Neurological: Negative  Psychiatric/Behavioral: Negative  Past Medical History  Past Medical History:   Diagnosis Date    Amenorrhea     Anemia     Cluster headache     Contact dermatitis     last assessed: 2013    Crohn's disease ([facility] [address] )     last assessed: 2012  Neg for Crohns based on last GI eval and path   Degenerative disc disease, cervical     Folliculitis     last assessed: 2013    Headache, tension-type     Hyperlipidemia     IBS (irritable bowel syndrome)     Menorrhagia     Migraine     Vitamin D deficiency disease     last assessed: 2016       Past Social History  Past Surgical History:   Procedure Laterality Date     SECTION      DILATION AND CURETTAGE OF UTERUS      ENDOMETRIAL ABLATION      ESOPHAGOGASTRODUODENOSCOPY      LASER ABLATION OF VASCULAR LESION      [place] COLONOSCOPY FLX DX W/COLLJ SPEC WHEN PFRMD N/A 9/15/2016    Procedure: COLONOSCOPY;  Surgeon: Danyelle Vega DO;  Location: [facility] GI LAB;   Service: Gastroenterology       Past Family History  Family History   Problem Relation Age of Onset    Arthritis Mother     Ulcerative colitis Mother    Nicolas Bran Glaucoma Mother     Seizures Mother    Nicolasmagdi Bran Stroke Mother     Breast cancer Mother 68    Migraines Mother     Parkinsonism Mother     Hypertension Father     Lymphoma Father         non hodkins    Raynaud syndrome Daughter     No Known Problems Daughter     No Known Problems Daughter     No Known Problems Maternal Grandmother     No Known Problems Maternal Grandfather     No Known Problems Paternal Grandmother     No Known Problems Paternal Grandfather     No Known Problems Maternal Aunt     No Known Problems Paternal Aunt        Past Social history  Social History Socioeconomic History    Marital status: /Civil Union     Spouse name: Not on file    Number of children: Not on file    Years of education: Not on file    Highest education level: Not on file   Occupational History    Not on file   Tobacco Use    Smoking status: Never    Smokeless tobacco: Never   Vaping Use    Vaping Use: Never used   Substance and Sexual Activity    Alcohol use: Yes     Comment: social     Drug use: No    Sexual activity: Yes     Partners: Male     Birth control/protection: Male Sterilization   Other Topics Concern    Not on file   Social History Narrative    Not on file     Social Determinants of Health     Financial Resource Strain: Not on file   Food Insecurity: Not on file   Transportation Needs: Not on file   Physical Activity: Not on file   Stress: Not on file   Social Connections: Not on file   Intimate Partner Violence: Not on file   Housing Stability: Not on file       Current Medications  Current Outpatient Medications   Medication Sig Dispense Refill    cholecalciferol (VITAMIN D3) 400 units tablet Take 400 Units by mouth daily      Cyanocobalamin (VITAMIN B12 PO) Take 1 tablet by mouth daily      cycloSPORINE (RESTASIS) 0 05 % ophthalmic emulsion Restasis 0 05 % eye drops in a dropperette      cyproheptadine (PERIACTIN) 4 mg tablet Take 1 tab qhs prn for weather related headache 30 tablet 0    dicyclomine (BENTYL) 20 mg tablet Take 1 tablet (20 mg total) by mouth 3 (three) times a day as needed (abdominal cramping) 30 tablet 1    estradiol (ESTRACE VAGINAL) 0 1 mg/g vaginal cream Insert 1 g into the vagina 3 (three) times a week 42 5 g 1    frovatriptan (FROVA) 2 5 MG tablet Take 1 tablet (2 5 mg total) by mouth if needed for migraine If recurs, may repeat after 2 hours  Max of 3 tabs in 24 hours   9 tablet 1    ketorolac (TORADOL) 10 mg tablet Take 1 tab at onset of headache 30 tablet 0    latanoprost (XALATAN) 0 005 % ophthalmic solution 1 drop daily at bedtime      magnesium oxide (MAG-OX) 400 mg Take 250 mg by mouth daily      methenamine hippurate (HIPREX) 1 g tablet Take 1 tablet (1 g total) by mouth 2 (two) times a day with meals 60 tablet 3    nortriptyline (PAMELOR) 25 mg capsule Take 1 capsule (25 mg total) by mouth daily at bedtime 90 capsule 2    Riboflavin (VITAMIN B-2 PO) Take 1 tablet by mouth daily      Varenicline Tartrate (Tyrvaya) 0 03 MG/ACT SOLN 1 spray into each nostril 2 (two) times a day      venlafaxine (EFFEXOR-XR) 75 mg 24 hr capsule Take 1 capsule (75 mg total) by mouth daily 90 capsule 2     No current facility-administered medications for this visit  Allergies  Allergies   Allergen Reactions    Sulfa Antibiotics        Past medical history, social history, family history, medications and allergies were reviewed  Vitals  There were no vitals filed for this visit  Physical Exam  Physical Exam  Constitutional:       Appearance: Normal appearance  She is well-developed  HENT:      Head: Normocephalic  Eyes:      Pupils: Pupils are equal, round, and reactive to light  Pulmonary:      Effort: Pulmonary effort is normal    Abdominal:      Palpations: Abdomen is soft  Musculoskeletal:         General: Normal range of motion  Cervical back: Normal range of motion  Skin:     General: Skin is warm and dry  Neurological:      General: No focal deficit present  Mental Status: She is alert and oriented to person, place, and time  Psychiatric:         Mood and Affect: Mood normal          Behavior: Behavior normal          Thought Content:  Thought content normal          Judgment: Judgment normal          Results    I have personally reviewed all pertinent lab results and reviewed with patient  Lab Results   Component Value Date    K 4 5 12/28/2018    CO2 20 12/28/2018     12/28/2018    BUN 18 12/28/2018    CREATININE 0 77 12/28/2018     Lab Results   Component Value Date    WBC 5 8 11/10/2018    HGB 12 9 11/10/2018    HCT 39 1 11/10/2018    MCV 88 11/10/2018     11/10/2018     No results found for this or any previous visit (from the past 1 hour(s))

## 2023-05-05 ENCOUNTER — OFFICE VISIT (OUTPATIENT)
Dept: UROLOGY | Facility: AMBULATORY SURGERY CENTER | Age: 58
End: 2023-05-05

## 2023-05-05 VITALS
HEIGHT: 65 IN | DIASTOLIC BLOOD PRESSURE: 84 MMHG | WEIGHT: 150 LBS | OXYGEN SATURATION: 100 % | HEART RATE: 78 BPM | BODY MASS INDEX: 24.99 KG/M2 | SYSTOLIC BLOOD PRESSURE: 132 MMHG

## 2023-05-05 DIAGNOSIS — N39.0 RECURRENT UTI: Primary | ICD-10-CM

## 2023-05-05 LAB
POST-VOID RESIDUAL VOLUME, ML POC: 0 ML
SL AMB  POCT GLUCOSE, UA: NORMAL
SL AMB LEUKOCYTE ESTERASE,UA: NORMAL
SL AMB POCT BILIRUBIN,UA: NORMAL
SL AMB POCT BLOOD,UA: NORMAL
SL AMB POCT CLARITY,UA: CLEAR
SL AMB POCT COLOR,UA: YELLOW
SL AMB POCT KETONES,UA: NORMAL
SL AMB POCT NITRITE,UA: NORMAL
SL AMB POCT PH,UA: 7.5
SL AMB POCT SPECIFIC GRAVITY,UA: 1.01
SL AMB POCT URINE PROTEIN: NORMAL
SL AMB POCT UROBILINOGEN: 0.2

## 2023-05-05 RX ORDER — ESTRADIOL 0.1 MG/G
1 CREAM VAGINAL 3 TIMES WEEKLY
Qty: 42.5 G | Refills: 3 | Status: SHIPPED | OUTPATIENT
Start: 2023-05-05

## 2023-05-05 RX ORDER — METHENAMINE HIPPURATE 1000 MG/1
1 TABLET ORAL 2 TIMES DAILY WITH MEALS
Qty: 180 TABLET | Refills: 3 | Status: SHIPPED | OUTPATIENT
Start: 2023-05-05

## 2023-06-26 DIAGNOSIS — K58.2 IRRITABLE BOWEL SYNDROME WITH BOTH CONSTIPATION AND DIARRHEA: ICD-10-CM

## 2023-06-27 RX ORDER — DICYCLOMINE HCL 20 MG
20 TABLET ORAL 3 TIMES DAILY PRN
Qty: 30 TABLET | Refills: 0 | Status: SHIPPED | OUTPATIENT
Start: 2023-06-27

## 2023-07-05 ENCOUNTER — OFFICE VISIT (OUTPATIENT)
Dept: NEUROLOGY | Facility: CLINIC | Age: 58
End: 2023-07-05
Payer: COMMERCIAL

## 2023-07-05 VITALS
BODY MASS INDEX: 25.11 KG/M2 | WEIGHT: 150.7 LBS | SYSTOLIC BLOOD PRESSURE: 124 MMHG | HEART RATE: 96 BPM | HEIGHT: 65 IN | DIASTOLIC BLOOD PRESSURE: 76 MMHG

## 2023-07-05 DIAGNOSIS — G43.709 CHRONIC MIGRAINE WITHOUT AURA WITHOUT STATUS MIGRAINOSUS, NOT INTRACTABLE: Primary | ICD-10-CM

## 2023-07-05 PROCEDURE — 99214 OFFICE O/P EST MOD 30 MIN: CPT | Performed by: NURSE PRACTITIONER

## 2023-07-05 RX ORDER — NORTRIPTYLINE HYDROCHLORIDE 25 MG/1
25 CAPSULE ORAL
Qty: 90 CAPSULE | Refills: 2 | Status: SHIPPED | OUTPATIENT
Start: 2023-07-05

## 2023-07-05 RX ORDER — VENLAFAXINE HYDROCHLORIDE 75 MG/1
75 CAPSULE, EXTENDED RELEASE ORAL DAILY
Qty: 90 CAPSULE | Refills: 2 | Status: SHIPPED | OUTPATIENT
Start: 2023-07-05

## 2023-07-05 NOTE — PATIENT INSTRUCTIONS
Trial nurtec at onset of headache, this would replace the frovatriptan given you have had side effects with triptans    If too costly would look on manufacture website for coupon , if still too costly with that let me know.

## 2023-07-05 NOTE — PROGRESS NOTES
Patient ID: Jonathon Rutledge is a 62 y.o. female. Assessment/Plan:    Chronic migraine without aura without status migrainosus, not intractable  Patient improvement in headaches, decreased frequency, severity, and better response to abortive medications with increase in venlafaxine. Her dry mouth did improve with reduction of nortriptyline. We did discuss considering an increase in venlafaxine, however patient did not wish to make any changes to preventative medications today. She has experience drowsiness with several triptans (frovatriptan, ritzatriptan) however medication is effective. Given side effect she was willing to trial nurtec to see if less side effects. Plan:   Preventative:   nortriptyline 25 mg 1 tab qhs  venlafaxine 75 mg qd  Vitamin b2  Magnesium oxide 400 mg daily        Abortive: At onset of headache take nurtec 75 mg  May use toradol is headache persists   cyproheptadine prn for weather related headaches         Plan for follow up in 6-8 months. To contact the office sooner with any concerns or worsening symptoms. Diagnoses and all orders for this visit:    Chronic migraine without aura without status migrainosus, not intractable  -     rimegepant sulfate (NURTEC) 75 mg TBDP; Take 1 tab at onset of headache, max 1 tab in 24 hours  -     venlafaxine (EFFEXOR-XR) 75 mg 24 hr capsule; Take 1 capsule (75 mg total) by mouth daily  -     nortriptyline (PAMELOR) 25 mg capsule; Take 1 capsule (25 mg total) by mouth daily at bedtime           Subjective:    HPI       Patient is a 65 y/o F who is here as a follow up for chronic migraines. Last office 1/2023 in which she noted ongoing dry mouth, was to reduce nortriptyline. Interval History:  She has found cyproheptadine helpful for weather related headaches. She feels headaches are under good control depending on circumstances. She has about 1-2 migraines per week. Do respond to abortive medications.   She has found headaches are less severe and not lasting as long as previous. She has had some family stressors and has a slight increase in mild tension type headaches but these are not overly bothersome right now. Dry mouth is improved with decreasing nortriptyline. Prior hx   Location: temples , parietal bilaterally     Duration: a few hours, previously would last the entire day      Timing: varies     Description: pounding, feels like head is in a "vice", Rates: severe-8/10-occurring much less frequency, moderate 4-5/10 typically will treat at this point      Associated symptoms: photophobia, phonophobia, vomiting, pins and needles on the temple rarely     She has had facial droop in the past with headaches, but has not had this in 10 years. Aura: none     Triggers: Weather at times, stress     Lifestyle: 2-3 cups of coffee daily, 3-4 bottles of water daily, 6 hours nightly sometimes can have trouble sleeping, she has morning grogginess with melatonin      Denies vision changes, dizziness, no worsening with coughing/sneezing/exercise/position change. Current medications:  Preventative:   nortriptyline 25 mg 1 tab qhs  venlafaxine 75 mg qd  Vitamin b2  Magnesium oxide 400 mg daily        Abortive:  Frovatriptan-can make her drowsy, so will only take it the evening  toradol  motrin   cyproheptadine prn for weather related headaches     prior medicaitons:  topamax, nortriptyline, magnesium, b2     maxalt  Rizatriptan 10 mg-was changed due to insurance    The following portions of the patient's history were reviewed and updated as appropriate: allergies, current medications, past family history, past medical history, past social history, past surgical history and problem list.          Objective:    Blood pressure 124/76, pulse 96, height 5' 5" (1.651 m), weight 68.4 kg (150 lb 11.2 oz), not currently breastfeeding. Physical Exam  Constitutional:       General: She is awake.    HENT:      Right Ear: Hearing normal. Left Ear: Hearing normal.   Eyes:      General: Lids are normal.      Extraocular Movements: Extraocular movements intact. Pupils: Pupils are equal, round, and reactive to light. Neurological:      Mental Status: She is alert. Motor: Motor strength is normal.     Deep Tendon Reflexes:      Reflex Scores:       Bicep reflexes are 2+ on the right side and 2+ on the left side. Brachioradialis reflexes are 2+ on the right side and 2+ on the left side. Patellar reflexes are 2+ on the right side and 2+ on the left side. Achilles reflexes are 2+ on the right side and 2+ on the left side. Psychiatric:         Speech: Speech normal.         Neurological Exam  Mental Status  Awake and alert. Oriented to person, place, time and situation. Speech is normal. Language is fluent with no aphasia. Cranial Nerves  CN II: Visual fields full to confrontation. CN III, IV, VI: Extraocular movements intact bilaterally. Normal lids and orbits bilaterally. Pupils equal round and reactive to light bilaterally. CN V:  Right: Facial sensation is normal.  Left: Facial sensation is normal on the left. CN VII:  Right: There is no facial weakness. Left: There is no facial weakness. CN VIII:  Right: Hearing is normal.  Left: Hearing is normal.  CN IX, X: Palate elevates symmetrically  CN XI:  Right: Trapezius strength is normal.  Left: Trapezius strength is normal.  CN XII: Tongue midline without atrophy or fasciculations. Motor   Strength is 5/5 throughout all four extremities. Sensory  Light touch is normal in upper and lower extremities. Temperature is normal in upper and lower extremities.      Reflexes                                            Right                      Left  Brachioradialis                    2+                         2+  Biceps                                 2+                         2+  Patellar                                2+                         2+  Achilles 2+                         2+    Coordination  Right: Finger-to-nose normal.Left: Finger-to-nose normal.    Gait  Casual gait is normal including stance, stride, and arm swing. Able to rise from chair without using arms. I have personally reviewed the ROS performed by the MA.      ROS:    Review of Systems   Constitutional: Negative for appetite change, fatigue and fever. HENT: Negative. Negative for hearing loss, tinnitus, trouble swallowing and voice change. Eyes: Negative. Negative for photophobia, pain and visual disturbance. Respiratory: Negative. Negative for shortness of breath. Cardiovascular: Negative. Negative for palpitations. Gastrointestinal: Negative. Negative for nausea and vomiting. Endocrine: Negative. Negative for cold intolerance. Genitourinary: Negative. Negative for dysuria, frequency and urgency. Musculoskeletal: Negative for back pain, gait problem, myalgias and neck pain. Skin: Negative. Negative for rash. Allergic/Immunologic: Negative. Neurological: Negative. Negative for dizziness, tremors, seizures, syncope, facial asymmetry, speech difficulty, weakness, light-headedness, numbness and headaches. Hematological: Negative. Does not bruise/bleed easily. Psychiatric/Behavioral: Negative. Negative for confusion, hallucinations and sleep disturbance.

## 2023-07-05 NOTE — ASSESSMENT & PLAN NOTE
Patient improvement in headaches, decreased frequency, severity, and better response to abortive medications with increase in venlafaxine. Her dry mouth did improve with reduction of nortriptyline. We did discuss considering an increase in venlafaxine, however patient did not wish to make any changes to preventative medications today. She has experience drowsiness with several triptans (frovatriptan, ritzatriptan) however medication is effective. Given side effect she was willing to trial nurtec to see if less side effects. Plan:   Preventative:   nortriptyline 25 mg 1 tab qhs  venlafaxine 75 mg qd  Vitamin b2  Magnesium oxide 400 mg daily        Abortive: At onset of headache take nurtec 75 mg  May use toradol is headache persists   cyproheptadine prn for weather related headaches         Plan for follow up in 6-8 months. To contact the office sooner with any concerns or worsening symptoms.

## 2023-07-18 ENCOUNTER — PATIENT MESSAGE (OUTPATIENT)
Dept: NEUROLOGY | Facility: CLINIC | Age: 58
End: 2023-07-18

## 2023-07-20 ENCOUNTER — TELEPHONE (OUTPATIENT)
Dept: NEUROLOGY | Facility: CLINIC | Age: 58
End: 2023-07-20

## 2023-07-20 NOTE — TELEPHONE ENCOUNTER
----- Message from Ruben Samuel RN sent at 7/18/2023  5:31 PM EDT -----  Regarding: FW: Nurtec  Contact: [phone]    ----- Message -----  From: Ruperto Perdomo  Sent: 7/18/2023   5:21 PM EDT  To: Neurology [facility] Clinical Team 5  Subject: Nurtec                                           Good Afternoon,  The pharmacy was unable to fill the prescription of Nurtec because it needed an authorization. They said that they were going to contact your office but I haven't heard anything about it.         Thank you,  Shaina Hutchinson

## 2023-08-08 ENCOUNTER — ANNUAL EXAM (OUTPATIENT)
Age: 58
End: 2023-08-08
Payer: COMMERCIAL

## 2023-08-08 VITALS
DIASTOLIC BLOOD PRESSURE: 70 MMHG | BODY MASS INDEX: 25.16 KG/M2 | WEIGHT: 151 LBS | HEIGHT: 65 IN | SYSTOLIC BLOOD PRESSURE: 122 MMHG

## 2023-08-08 DIAGNOSIS — Z01.419 ROUTINE GYNECOLOGICAL EXAMINATION: Primary | ICD-10-CM

## 2023-08-08 DIAGNOSIS — Z12.31 ENCOUNTER FOR SCREENING MAMMOGRAM FOR BREAST CANCER: ICD-10-CM

## 2023-08-08 PROCEDURE — S0612 ANNUAL GYNECOLOGICAL EXAMINA: HCPCS | Performed by: OBSTETRICS & GYNECOLOGY

## 2023-08-08 NOTE — PROGRESS NOTES
Andres Carson   1965    CC:  Yearly exam    S:  62 y.o. female here for yearly exam. She is postmenopausal and has had no vaginal bleeding. Continues with chronic vaginal discharge.  + headaches continue to be a problem. + return of hot flashes  Notices she has no libido. Sexual activity: She is sexually active without pain, bleeding. She does note dryness. Has generalized issues with dryness - eyes, mouth, etc.     She does report urinary incontinence with some cough/sneeze , no other urinary concerns, bowel problems, breast concerns. Last Pap: 1/14/19 - NILM, Neg HPV  Last Mammo: 8/16/22 - [facility] 1  Last Colonoscopy: 9/15/16 - 10yr recall     We reviewed ASCCP guidelines for Pap testing. Family hx of breast cancer: Mother  Family hx of ovarian cancer: no  Family hx of colon cancer: no      Current Outpatient Medications:   •  cholecalciferol (VITAMIN D3) 400 units tablet, Take 400 Units by mouth daily, Disp: , Rfl:   •  Cyanocobalamin (VITAMIN B12 PO), Take 1 tablet by mouth daily, Disp: , Rfl:   •  cycloSPORINE (RESTASIS) 0.05 % ophthalmic emulsion, Restasis 0.05 % eye drops in a dropperette, Disp: , Rfl:   •  cyproheptadine (PERIACTIN) 4 mg tablet, Take 1 tab qhs prn for weather related headache, Disp: 30 tablet, Rfl: 0  •  dicyclomine (BENTYL) 20 mg tablet, Take 1 tablet (20 mg total) by mouth 3 (three) times a day as needed (abdominal cramping), Disp: 30 tablet, Rfl: 0  •  estradiol (ESTRACE VAGINAL) 0.1 mg/g vaginal cream, Insert 1 g into the vagina 3 (three) times a week, Disp: 42.5 g, Rfl: 3  •  frovatriptan (FROVA) 2.5 MG tablet, Take 1 tablet (2.5 mg total) by mouth if needed for migraine If recurs, may repeat after 2 hours. Max of 3 tabs in 24 hours. , Disp: 9 tablet, Rfl: 1  •  ketorolac (TORADOL) 10 mg tablet, Take 1 tab at onset of headache, Disp: 30 tablet, Rfl: 0  •  latanoprost (XALATAN) 0.005 % ophthalmic solution, 1 drop daily at bedtime, Disp: , Rfl:   •  magnesium oxide (MAG-OX) 400 mg, Take 250 mg by mouth daily, Disp: , Rfl:   •  methenamine hippurate (HIPREX) 1 g tablet, Take 1 tablet (1 g total) by mouth 2 (two) times a day with meals, Disp: 180 tablet, Rfl: 3  •  nortriptyline (PAMELOR) 25 mg capsule, Take 1 capsule (25 mg total) by mouth daily at bedtime, Disp: 90 capsule, Rfl: 2  •  Riboflavin (VITAMIN B-2 PO), Take 1 tablet by mouth daily, Disp: , Rfl:   •  Varenicline Tartrate (Tyrvaya) 0.03 MG/ACT SOLN, 1 spray into each nostril 2 (two) times a day, Disp: , Rfl:   •  venlafaxine (EFFEXOR-XR) 75 mg 24 hr capsule, Take 1 capsule (75 mg total) by mouth daily, Disp: 90 capsule, Rfl: 2  •  rimegepant sulfate (NURTEC) 75 mg TBDP, Take 1 tab at onset of headache, max 1 tab in 24 hours (Patient not taking: Reported on 8/8/2023), Disp: 8 tablet, Rfl: 2  Patient Active Problem List   Diagnosis   • Chronic migraine without aura without status migrainosus, not intractable   • Gastroesophageal reflux disease without esophagitis   • Degenerative cervical disc   • Irritable bowel syndrome   • Dense breast   • Primary open angle glaucoma (POAG) of both eyes     Family History   Problem Relation Age of Onset   • Arthritis Mother    • Ulcerative colitis Mother    • Glaucoma Mother    • Seizures Mother    • Stroke Mother    • Breast cancer Mother 68   • Migraines Mother    • Parkinsonism Mother    • Hypertension Father    • Lymphoma Father         non hodkins   • Raynaud syndrome Daughter    • No Known Problems Daughter    • No Known Problems Daughter    • No Known Problems Maternal Grandmother    • No Known Problems Maternal Grandfather    • No Known Problems Paternal Grandmother    • No Known Problems Paternal Grandfather    • No Known Problems Maternal Aunt    • No Known Problems Paternal Aunt      Past Medical History:   Diagnosis Date   • Amenorrhea    • Anemia    • Cluster headache    • Contact dermatitis     last assessed: 6/17/2013   • Crohn's disease ([address] [facility])     last assessed: 8/8/2012. Neg for Crohns based on last GI eval and path. • Degenerative disc disease, cervical    • Folliculitis     last assessed: 7/30/2013   • Headache, tension-type    • Hyperlipidemia    • IBS (irritable bowel syndrome)    • Menorrhagia    • Migraine    • Vitamin D deficiency disease     last assessed: 5/17/2016        Review of Systems   Respiratory: Negative. Cardiovascular: Negative. Gastrointestinal: Negative for constipation and diarrhea. Genitourinary: Negative for difficulty urinating, pelvic pain, vaginal bleeding, vaginal discharge, itching or odor. O:  Blood pressure 122/70, height 5' 5" (1.651 m), weight 68.5 kg (151 lb), not currently breastfeeding. Patient appears well and is not in distress  Breasts are symmetrical without mass, tenderness, nipple discharge, skin changes or adenopathy. Abdomen is soft and nontender without masses. External genitals are normal without lesions or rashes. Urethral meatus and urethra are normal  Bladder is normal to palpation  Vagina is normal without discharge or bleeding, generalized atrophic changes present   Cervix is normal without discharge or lesion. Uterus is normal, mobile, nontender without palpable mass. Adnexa are normal, nontender, without palpable mass. A:  Yearly exam.     P:   Pap & HPV up to date  Mammo ordered   Colon Cancer Screening up to date     Reviewed considerations of menopause, to call with any postmenopausal bleeding or other concerns. RTO one year for yearly exam or sooner as needed.

## 2023-08-10 ENCOUNTER — VBI (OUTPATIENT)
Dept: ADMINISTRATIVE | Facility: OTHER | Age: 58
End: 2023-08-10

## 2023-08-17 DIAGNOSIS — N39.0 URINARY TRACT INFECTION WITHOUT HEMATURIA, SITE UNSPECIFIED: Primary | ICD-10-CM

## 2023-08-17 RX ORDER — NITROFURANTOIN 25; 75 MG/1; MG/1
100 CAPSULE ORAL 2 TIMES DAILY
Qty: 14 CAPSULE | Refills: 0 | Status: SHIPPED | OUTPATIENT
Start: 2023-08-17

## 2023-09-06 ENCOUNTER — HOSPITAL ENCOUNTER (OUTPATIENT)
Dept: MAMMOGRAPHY | Facility: MEDICAL CENTER | Age: 58
Discharge: HOME | End: 2023-09-06
Payer: COMMERCIAL

## 2023-09-06 VITALS — WEIGHT: 151 LBS | BODY MASS INDEX: 25.16 KG/M2 | HEIGHT: 65 IN

## 2023-09-06 DIAGNOSIS — Z12.31 ENCOUNTER FOR SCREENING MAMMOGRAM FOR BREAST CANCER: ICD-10-CM

## 2023-09-06 PROCEDURE — 77067 SCR MAMMO BI INCL CAD: CPT

## 2023-09-06 PROCEDURE — 77063 BREAST TOMOSYNTHESIS BI: CPT

## 2023-10-10 ENCOUNTER — OFFICE VISIT (OUTPATIENT)
Dept: FAMILY MEDICINE CLINIC | Facility: CLINIC | Age: 58
End: 2023-10-10
Payer: COMMERCIAL

## 2023-10-10 VITALS
HEIGHT: 65 IN | OXYGEN SATURATION: 99 % | TEMPERATURE: 97.8 F | WEIGHT: 144 LBS | DIASTOLIC BLOOD PRESSURE: 82 MMHG | SYSTOLIC BLOOD PRESSURE: 120 MMHG | HEART RATE: 74 BPM | BODY MASS INDEX: 23.99 KG/M2

## 2023-10-10 DIAGNOSIS — J06.9 ACUTE URI: Primary | ICD-10-CM

## 2023-10-10 PROCEDURE — 99214 OFFICE O/P EST MOD 30 MIN: CPT | Performed by: NURSE PRACTITIONER

## 2023-10-10 RX ORDER — PREDNISONE 20 MG/1
40 TABLET ORAL DAILY
Qty: 6 TABLET | Refills: 0 | Status: SHIPPED | OUTPATIENT
Start: 2023-10-10 | End: 2023-10-13

## 2023-10-10 NOTE — PATIENT INSTRUCTIONS
Start prednisone, this is the steroid. 40mg daily for 3 days. Take with food. It's better to take it earlier in the day than later as it could keep you up at night. Do not mix with NSAIDs such as aleve, ibuprofen, advil, motrin. Stay well hydrated. Start Flonase, this is an intranasal corticosteroid. This is 1-2 sprays each nostril once daily. Please be aware that this can cause nasal mucosa irritation. Stop using if you experience any nose bleeds. This can be used for allergy symptoms as well as ear discomfort/pressure. Please call the office if you are experiencing any worsening of symptoms or no symptom improvement.

## 2023-10-10 NOTE — PROGRESS NOTES
Name: Priyanka Hernandez      : 1965      MRN: [id number]  Encounter Provider: JEANNIE Quiros  Encounter Date: 10/10/2023   Encounter department: [facility]    Assessment & Plan     1. Acute URI  -     predniSONE 20 mg tablet; Take 2 tablets (40 mg total) by mouth daily for 3 days    No signs of acute bacterial infection on exam.  Symptom management. tart prednisone, this is the steroid. 40mg daily for 3 days. Take with food. It's better to take it earlier in the day than later as it could keep you up at night. Do not mix with NSAIDs such as aleve, ibuprofen, advil, motrin. Stay well hydrated. Start Flonase, this is an intranasal corticosteroid. This is 1-2 sprays each nostril once daily. Please be aware that this can cause nasal mucosa irritation. Stop using if you experience any nose bleeds. This can be used for allergy symptoms as well as ear discomfort/pressure. Please call the office if you are experiencing any worsening of symptoms or no symptom improvement. Subjective      Here to discuss cold symptoms present x 3 days.  also sick. He was negative for strep and COVID. She didn't do any covid testing. No covid exposure. Sore throat is largest complaint- worse in the morning. Sore Throat   This is a new problem. The current episode started in the past 7 days. The problem has been rapidly worsening. The pain is worse on the left side. There has been no fever. The fever has been present for less than 1 day. The pain is at a severity of 7/10. Associated symptoms include congestion, coughing, diarrhea, ear pain, headaches, a hoarse voice, a plugged ear sensation, stridor, swollen glands and trouble swallowing. Pertinent negatives include no abdominal pain, drooling, ear discharge, neck pain, shortness of breath or vomiting. She has had no exposure to strep or mono.      Review of Systems   HENT: Positive for congestion, ear pain, hoarse voice, sore throat and trouble swallowing. Negative for drooling and ear discharge. Respiratory: Positive for cough and stridor. Negative for shortness of breath. Gastrointestinal: Positive for diarrhea. Negative for abdominal pain and vomiting. Musculoskeletal: Negative for neck pain. Neurological: Positive for headaches. Current Outpatient Medications on File Prior to Visit   Medication Sig   • cholecalciferol (VITAMIN D3) 400 units tablet Take 400 Units by mouth daily   • Cyanocobalamin (VITAMIN B12 PO) Take 1 tablet by mouth daily   • cycloSPORINE (RESTASIS) 0.05 % ophthalmic emulsion Restasis 0.05 % eye drops in a dropperette   • cyproheptadine (PERIACTIN) 4 mg tablet Take 1 tab qhs prn for weather related headache   • dicyclomine (BENTYL) 20 mg tablet Take 1 tablet (20 mg total) by mouth 3 (three) times a day as needed (abdominal cramping)   • estradiol (ESTRACE VAGINAL) 0.1 mg/g vaginal cream Insert 1 g into the vagina 3 (three) times a week   • latanoprost (XALATAN) 0.005 % ophthalmic solution 1 drop daily at bedtime   • magnesium oxide (MAG-OX) 400 mg Take 250 mg by mouth daily   • methenamine hippurate (HIPREX) 1 g tablet Take 1 tablet (1 g total) by mouth 2 (two) times a day with meals   • nortriptyline (PAMELOR) 25 mg capsule Take 1 capsule (25 mg total) by mouth daily at bedtime   • Riboflavin (VITAMIN B-2 PO) Take 1 tablet by mouth daily   • Varenicline Tartrate (Tyrvaya) 0.03 MG/ACT SOLN 1 spray into each nostril 2 (two) times a day   • venlafaxine (EFFEXOR-XR) 75 mg 24 hr capsule Take 1 capsule (75 mg total) by mouth daily   • frovatriptan (FROVA) 2.5 MG tablet Take 1 tablet (2.5 mg total) by mouth if needed for migraine If recurs, may repeat after 2 hours. Max of 3 tabs in 24 hours.  (Patient not taking: Reported on 10/10/2023)   • ketorolac (TORADOL) 10 mg tablet Take 1 tab at onset of headache (Patient not taking: Reported on 10/10/2023)   • nitrofurantoin (MACROBID) 100 mg capsule Take 1 capsule (100 mg total) by mouth 2 (two) times a day (Patient not taking: Reported on 10/10/2023)   • rimegepant sulfate (NURTEC) 75 mg TBDP Take 1 tab at onset of headache, max 1 tab in 24 hours (Patient not taking: Reported on 8/8/2023)       Objective     /82 (BP Location: Left arm, Patient Position: Sitting, Cuff Size: Adult)   Pulse 74   Temp 97.8 °F (36.6 °C) (Temporal)   Ht 5' 5" (1.651 m)   Wt 65.3 kg (144 lb)   LMP  (LMP Unknown)   SpO2 99%   BMI 23.96 kg/m²     Physical Exam  Vitals and nursing note reviewed. Constitutional:       General: She is not in acute distress. Appearance: She is well-developed. She is not diaphoretic. HENT:      Head: Normocephalic and atraumatic. Right Ear: External ear normal. A middle ear effusion is present. Tympanic membrane is retracted. Tympanic membrane is not perforated or erythematous. Left Ear: External ear normal. A middle ear effusion is present. Tympanic membrane is erythematous and retracted. Tympanic membrane is not perforated. Mouth/Throat:      Pharynx: Oropharynx is clear. Uvula midline. Posterior oropharyngeal erythema present. No oropharyngeal exudate. Eyes:      General: Lids are normal.         Right eye: No discharge. Left eye: No discharge. Conjunctiva/sclera: Conjunctivae normal.   Cardiovascular:      Rate and Rhythm: Normal rate and regular rhythm. Heart sounds: No murmur heard. Pulmonary:      Effort: Pulmonary effort is normal. No respiratory distress. Breath sounds: Normal breath sounds. No wheezing. Musculoskeletal:         General: No deformity. Cervical back: Neck supple. Lymphadenopathy:      Head:      Right side of head: Tonsillar adenopathy present. Left side of head: Tonsillar adenopathy present. Skin:     General: Skin is warm and dry. Neurological:      Mental Status: She is alert and oriented to person, place, and time.    Psychiatric:         Speech: Speech normal.         Behavior: Behavior normal.         Thought Content:  Thought content normal.         Judgment: Judgment normal.       JEANNIE Navarro

## 2023-10-12 DIAGNOSIS — J32.9 SINUSITIS, UNSPECIFIED CHRONICITY, UNSPECIFIED LOCATION: Primary | ICD-10-CM

## 2023-10-12 RX ORDER — AMOXICILLIN AND CLAVULANATE POTASSIUM 875; 125 MG/1; MG/1
1 TABLET, FILM COATED ORAL EVERY 12 HOURS SCHEDULED
Qty: 14 TABLET | Refills: 0 | Status: SHIPPED | OUTPATIENT
Start: 2023-10-12 | End: 2023-10-19

## 2023-10-18 ENCOUNTER — OFFICE VISIT (OUTPATIENT)
Dept: OBGYN CLINIC | Facility: CLINIC | Age: 58
End: 2023-10-18
Payer: COMMERCIAL

## 2023-10-18 DIAGNOSIS — R53.82 CHRONIC FATIGUE: ICD-10-CM

## 2023-10-18 DIAGNOSIS — N95.1 MENOPAUSAL SYMPTOMS: Primary | ICD-10-CM

## 2023-10-18 DIAGNOSIS — G47.9 SLEEP DISTURBANCE: ICD-10-CM

## 2023-10-18 PROCEDURE — 99215 OFFICE O/P EST HI 40 MIN: CPT | Performed by: OBSTETRICS & GYNECOLOGY

## 2023-10-20 ENCOUNTER — APPOINTMENT (OUTPATIENT)
Dept: LAB | Age: 58
End: 2023-10-20
Payer: COMMERCIAL

## 2023-10-20 DIAGNOSIS — R53.82 CHRONIC FATIGUE: ICD-10-CM

## 2023-10-20 LAB
CORTIS AM PEAK SERPL-MCNC: 14.3 UG/DL (ref 6.7–22.6)
T3FREE SERPL-MCNC: 3.49 PG/ML (ref 2.5–3.9)
TESTOST SERPL-MSCNC: 11 NG/DL
TSH SERPL DL<=0.05 MIU/L-ACNC: 0.75 UIU/ML (ref 0.45–4.5)

## 2023-10-20 PROCEDURE — 82627 DEHYDROEPIANDROSTERONE: CPT

## 2023-10-20 PROCEDURE — 84403 ASSAY OF TOTAL TESTOSTERONE: CPT

## 2023-10-20 PROCEDURE — 82533 TOTAL CORTISOL: CPT

## 2023-10-20 PROCEDURE — 84481 FREE ASSAY (FT-3): CPT

## 2023-10-20 PROCEDURE — 36415 COLL VENOUS BLD VENIPUNCTURE: CPT

## 2023-10-20 PROCEDURE — 84443 ASSAY THYROID STIM HORMONE: CPT

## 2023-10-21 LAB — DHEA-S SERPL-MCNC: 131 UG/DL (ref 29.4–220.5)

## 2023-10-24 DIAGNOSIS — N95.1 MENOPAUSAL SYMPTOMS: Primary | ICD-10-CM

## 2023-10-24 RX ORDER — PROGESTERONE 100 MG/1
100 CAPSULE ORAL
Qty: 30 CAPSULE | Refills: 11 | Status: SHIPPED | OUTPATIENT
Start: 2023-10-24

## 2023-10-24 RX ORDER — ESTRADIOL 0.05 MG/D
1 PATCH, EXTENDED RELEASE TRANSDERMAL 2 TIMES WEEKLY
Qty: 8 PATCH | Refills: 11 | Status: SHIPPED | OUTPATIENT
Start: 2023-10-26 | End: 2023-10-25 | Stop reason: SDUPTHER

## 2023-10-24 NOTE — PROGRESS NOTES
Assessment/Plan:       Diagnoses and all orders for this visit:    Menopausal symptoms    Chronic fatigue  -     TSH, 3rd generation with Free T4 reflex; Future  -     T3, free; Future  -     DHEA-sulfate; Future  -     Testosterone; Future  -     Cortisol Level, AM Specimen; Future    Sleep disturbance        1) This was a lengthy visit spent discussing the HPATG (hypothalamus/pituitary/adrenal/thyroid/gonadal) axis and the impact that hormonal deviation in one gland can have on another. It is not uncommon for ovarian declined to coincide or invoke thyroid and adrenal dysfunction as well. 2) I offered testing of thyroid and adrenal axes first to exclude pathology here. Will email her with results  3)Controversies surrounding estrogen based HRT discussed, including the fear based off the [facility] trials concerning Prempro. I will not be prescribing Prempro. One should not assume all therapies confer the same risk or benefit. Stay tuned to the REPLEN[place] trials for E2/PG therapy in the [facility] with [facility], but European trials with same medication point to safety and efficacy with superior health outcomes with women on this type of HRT. 4) I discussed the long term health benefits of E2/PG, including: reduction of CVD risk, reduction of hyperlipidemia, reduction of DM and GLP-1 agonist activity with mild appetite suppression; reduction of colon CA, osteoporosis and cognitive decline, Alzheimer's disease. 5) Braeden Burciagae is now on the market for breast cancer prevention with premarin/bazedoxifene. Did warn that this has elevated VTE risk. The best time to target CVD and atherosclerosis is within the first 5 years of menopause with oral estrogen  therapy. Unfortunately, this protective time frame has been lost, with AMI risk mildly increasing within first year of oral therapy use in both I and  trials. This risk is mitigated with transdermal therapy. Oral progesterone still preferred, no CVD or VTE risk.    6) She would like to think about her options until blood work back. Will decide if HRT is worth a try then. Would prefer transdermal E2/oral PG to start  7) Follow up prn. This was a 60 minute visit with greater than 50% of time spent in face to face counseling and coordination of care. Subjective:      Patient ID: Niki Hudson is a 62 y.o. female. Luis Alberto Strickland presents for hormonal consult, her first visit with me; self referred, sees Dr. Genet Pichardo for gyn care. Luis Alberto Strickland is s/p endo ablation, so menopausal status for how long uncertain. Over past 1.5 years complains of increasin) Hot flashes, worse in summer  2) Weight gain, mild, of 10 lbs . Does do yoga 1-2x/week, has not changed diet  3) Fatigue, does not feel refreshed upon awakening. 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 PMXH: chronic migraines; chronic UTIs recently; IBS  SHX: denies tobacco, ETOH  FHX: Mom alive, dx with breast Ca age76; s/p stroke, hx of DM, epilepsy. MGM lung Ca. MGF AMI. Uncle with neurofibromatosis. Dad alive, lymphoma, HTN. PGP old age. NO siblings. 4 daughters, ages 29/23/21/19. Review of Systems   Constitutional:  Positive for fatigue and unexpected weight change. Negative for activity change and appetite change. Cardiovascular: Negative. Gastrointestinal:  Positive for constipation. Endocrine: Positive for heat intolerance. Genitourinary: Negative. Neurological:  Negative for headaches. Psychiatric/Behavioral:  Negative for sleep disturbance.         Objective:      LMP  (LMP Unknown)          Physical Exam

## 2023-10-25 ENCOUNTER — TELEPHONE (OUTPATIENT)
Dept: OBGYN CLINIC | Facility: CLINIC | Age: 58
End: 2023-10-25

## 2023-10-25 DIAGNOSIS — N95.1 MENOPAUSAL SYMPTOMS: ICD-10-CM

## 2023-10-25 RX ORDER — ESTRADIOL 0.05 MG/D
1 PATCH, EXTENDED RELEASE TRANSDERMAL 2 TIMES WEEKLY
Qty: 8 PATCH | Refills: 11 | Status: SHIPPED | OUTPATIENT
Start: 2023-10-26 | End: 2023-10-26

## 2023-10-25 NOTE — TELEPHONE ENCOUNTER
Patient said no patches are covered so she will use the [employer] mike on her phone but can you res[facility]

## 2023-10-26 RX ORDER — ESTRADIOL 0.05 MG/D
PATCH, EXTENDED RELEASE TRANSDERMAL
Qty: 8 PATCH | Refills: 11 | Status: SHIPPED | OUTPATIENT
Start: 2023-10-26

## 2023-11-11 ENCOUNTER — OFFICE VISIT (OUTPATIENT)
Dept: URGENT CARE | Age: 58
End: 2023-11-11
Payer: COMMERCIAL

## 2023-11-11 VITALS
DIASTOLIC BLOOD PRESSURE: 64 MMHG | OXYGEN SATURATION: 98 % | HEIGHT: 65 IN | TEMPERATURE: 97.8 F | BODY MASS INDEX: 24.49 KG/M2 | SYSTOLIC BLOOD PRESSURE: 134 MMHG | HEART RATE: 88 BPM | WEIGHT: 147 LBS | RESPIRATION RATE: 18 BRPM

## 2023-11-11 DIAGNOSIS — J20.9 ACUTE BRONCHITIS, UNSPECIFIED ORGANISM: Primary | ICD-10-CM

## 2023-11-11 DIAGNOSIS — R05.1 ACUTE COUGH: ICD-10-CM

## 2023-11-11 DIAGNOSIS — J02.9 SORE THROAT: ICD-10-CM

## 2023-11-11 LAB
S PYO AG THROAT QL: NEGATIVE
SARS-COV-2 AG UPPER RESP QL IA: NEGATIVE
VALID CONTROL: NORMAL

## 2023-11-11 PROCEDURE — 99213 OFFICE O/P EST LOW 20 MIN: CPT

## 2023-11-11 PROCEDURE — 87070 CULTURE OTHR SPECIMN AEROBIC: CPT

## 2023-11-11 PROCEDURE — 87811 SARS-COV-2 COVID19 W/OPTIC: CPT

## 2023-11-11 PROCEDURE — 87880 STREP A ASSAY W/OPTIC: CPT

## 2023-11-11 RX ORDER — BENZONATATE 100 MG/1
100 CAPSULE ORAL 3 TIMES DAILY PRN
Qty: 20 CAPSULE | Refills: 0 | Status: SHIPPED | OUTPATIENT
Start: 2023-11-11

## 2023-11-11 RX ORDER — AZITHROMYCIN 250 MG/1
TABLET, FILM COATED ORAL
Qty: 6 TABLET | Refills: 0 | Status: SHIPPED | OUTPATIENT
Start: 2023-11-11 | End: 2023-11-15

## 2023-11-11 NOTE — PATIENT INSTRUCTIONS
Start antibiotic as prescribed  Start tessalon perles as prescribed  Vitamin D3 2000 IU daily  Vitamin C 1000mg twice per day  Multivitamin daily  Fluids and rest  Over the counter cold medication as needed (EX: Mucinex, tylenol/motrin)  Follow up with PCP in 3-5 days. Proceed to ER if symptoms worsen. Eat yogurt with live and active cultures and/or take a probiotic at least 3 hours before or after antibiotic dose. Monitor stool for diarrhea and/or blood. If this occurs, contact primary care doctor ASAP.

## 2023-11-11 NOTE — PROGRESS NOTES
[facility]        NAME: Malachi Cruz is a 62 y.o. female  : 1965    MRN: [id number]  DATE: 2023  TIME: 6:57 PM    Assessment and Plan   Acute bronchitis, unspecified organism [J20.9]  1. Acute bronchitis, unspecified organism  azithromycin (ZITHROMAX) 250 mg tablet    benzonatate (TESSALON PERLES) 100 mg capsule      2. Sore throat  Throat culture    POCT rapid strepA    Poct Covid 19 Rapid Antigen Test      3. Acute cough  benzonatate (TESSALON PERLES) 100 mg capsule        POCT rapid strep: Negative    POCT rapid covid: Negative    Patient Instructions     Start antibiotic as prescribed  Start tessalon perles as prescribed  Vitamin D3 2000 IU daily  Vitamin C 1000mg twice per day  Multivitamin daily  Fluids and rest  Over the counter cold medication as needed (EX: Mucinex, tylenol/motrin)  Follow up with PCP in 3-5 days. Proceed to ER if symptoms worsen. Eat yogurt with live and active cultures and/or take a probiotic at least 3 hours before or after antibiotic dose. Monitor stool for diarrhea and/or blood. If this occurs, contact primary care doctor ASAP. Chief Complaint     Chief Complaint   Patient presents with    Cough     Cough, sore throat x 1 week, left ear pain began yesterday         History of Present Illness       Patient is a 61 yo female with no significant PMH presenting in the clinic today for cold sx x 1 month. Admits dry cough, sore throat, left ear pain, "sweats", body aches, sinus pain/pressure, rhinorrhea, and headache. Patient notes her sx have been gradually worsening over the past week. Denies fever, chills, fatigue, congestion, chest pain, SOB, abdominal pain, n/v/d. Admits the use of Flonase, Advil, hot tea and DayQuil for sx management. 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 Denies h/o respiratory conditions and smoking.         Review of Systems   Review of Systems   Constitutional:  Negative for chills, fatigue and fever. HENT:  Positive for ear pain, postnasal drip, rhinorrhea, sinus pressure, sinus pain and sore throat. Negative for congestion, ear discharge and tinnitus. Respiratory:  Positive for cough. Negative for shortness of breath. Cardiovascular:  Negative for chest pain. Gastrointestinal:  Negative for abdominal pain, diarrhea, nausea and vomiting. Musculoskeletal:  Negative for myalgias. Skin:  Negative for rash. Neurological:  Positive for headaches. Negative for dizziness.          Current Medications       Current Outpatient Medications:     azithromycin (ZITHROMAX) 250 mg tablet, Take 2 tablets today then 1 tablet daily x 4 days, Disp: 6 tablet, Rfl: 0    benzonatate (TESSALON PERLES) 100 mg capsule, Take 1 capsule (100 mg total) by mouth 3 (three) times a day as needed for cough, Disp: 20 capsule, Rfl: 0    cholecalciferol (VITAMIN D3) 400 units tablet, Take 400 Units by mouth daily, Disp: , Rfl:     Cyanocobalamin (VITAMIN B12 PO), Take 1 tablet by mouth daily, Disp: , Rfl:     cycloSPORINE (RESTASIS) 0.05 % ophthalmic emulsion, Restasis 0.05 % eye drops in a dropperette, Disp: , Rfl:     cyproheptadine (PERIACTIN) 4 mg tablet, Take 1 tab qhs prn for weather related headache, Disp: 30 tablet, Rfl: 0    dicyclomine (BENTYL) 20 mg tablet, Take 1 tablet (20 mg total) by mouth 3 (three) times a day as needed (abdominal cramping), Disp: 30 tablet, Rfl: 0    estradiol (ESTRACE VAGINAL) 0.1 mg/g vaginal cream, Insert 1 g into the vagina 3 (three) times a week, Disp: 42.5 g, Rfl: 3    latanoprost (XALATAN) 0.005 % ophthalmic solution, 1 drop daily at bedtime, Disp: , Rfl:     magnesium oxide (MAG-OX) 400 mg, Take 250 mg by mouth daily, Disp: , Rfl:     methenamine hippurate (HIPREX) 1 g tablet, Take 1 tablet (1 g total) by mouth 2 (two) times a day with meals, Disp: 180 tablet, Rfl: 3    nortriptyline (PAMELOR) 25 mg capsule, Take 1 capsule (25 mg total) by mouth daily at bedtime, Disp: 90 capsule, Rfl: 2    Progesterone 100 MG CAPS, Take 100 mg by mouth at bedtime, Disp: 30 capsule, Rfl: 11    Riboflavin (VITAMIN B-2 PO), Take 1 tablet by mouth daily, Disp: , Rfl:     Varenicline Tartrate (Tyrvaya) 0.03 MG/ACT SOLN, 1 spray into each nostril 2 (two) times a day, Disp: , Rfl:     venlafaxine (EFFEXOR-XR) 75 mg 24 hr capsule, Take 1 capsule (75 mg total) by mouth daily, Disp: 90 capsule, Rfl: 2    estradiol (VIVELLE-DOT) 0.05 MG/24HR, PLACE 1 PATCH ON THE SKIN 2 TIMES A WEEK., Disp: 8 patch, Rfl: 11    frovatriptan (FROVA) 2.5 MG tablet, Take 1 tablet (2.5 mg total) by mouth if needed for migraine If recurs, may repeat after 2 hours. Max of 3 tabs in 24 hours. (Patient not taking: Reported on 10/10/2023), Disp: 9 tablet, Rfl: 1    ketorolac (TORADOL) 10 mg tablet, Take 1 tab at onset of headache (Patient not taking: Reported on 10/10/2023), Disp: 30 tablet, Rfl: 0    nitrofurantoin (MACROBID) 100 mg capsule, Take 1 capsule (100 mg total) by mouth 2 (two) times a day (Patient not taking: Reported on 10/10/2023), Disp: 14 capsule, Rfl: 0    rimegepant sulfate (NURTEC) 75 mg TBDP, Take 1 tab at onset of headache, max 1 tab in 24 hours (Patient not taking: Reported on 8/8/2023), Disp: 8 tablet, Rfl: 2    Current Allergies     Allergies as of 11/11/2023 - Reviewed 11/11/2023   Allergen Reaction Noted    Sulfa antibiotics  09/14/2016            The following portions of the patient's history were reviewed and updated as appropriate: allergies, current medications, past family history, past medical history, past social history, past surgical history and problem list.     Past Medical History:   Diagnosis Date    Amenorrhea     Anemia     Cluster headache     Contact dermatitis     last assessed: 6/17/2013    Crohn's disease ([address])     last assessed: 8/8/2012. Neg for Crohns based on last GI eval and path.      Degenerative disc disease, cervical     Folliculitis     last assessed: 7/30/2013 Headache, tension-type     Hyperlipidemia     IBS (irritable bowel syndrome)     Menorrhagia     Migraine     Vitamin D deficiency disease     last assessed: 2016       Past Surgical History:   Procedure Laterality Date     SECTION      DILATION AND CURETTAGE OF UTERUS      ENDOMETRIAL ABLATION      ESOPHAGOGASTRODUODENOSCOPY      LASER ABLATION OF VASCULAR LESION      VA COLONOSCOPY FLX DX W/COLLJ SPEC WHEN PFRMD N/A 9/15/2016    Procedure: COLONOSCOPY;  Surgeon: Mackenzie Tomlinson DO;  Location: [facility]; Service: Gastroenterology       Family History   Problem Relation Age of Onset    Arthritis Mother     Ulcerative colitis Mother     Glaucoma Mother     Seizures Mother     Stroke Mother     Breast cancer Mother 68    Migraines Mother     Parkinsonism Mother     Hypertension Father     Lymphoma Father         non hodkins    Raynaud syndrome Daughter     No Known Problems Daughter     No Known Problems Daughter     No Known Problems Maternal Grandmother     No Known Problems Maternal Grandfather     No Known Problems Paternal Grandmother     No Known Problems Paternal Grandfather     No Known Problems Maternal Aunt     No Known Problems Paternal Aunt          Medications have been verified. Objective   /64   Pulse 88   Temp 97.8 °F (36.6 °C)   Resp 18   Ht 5' 5" (1.651 m)   Wt 66.7 kg (147 lb)   LMP  (LMP Unknown)   SpO2 98%   BMI 24.46 kg/m²        Physical Exam     Physical Exam  Vitals reviewed. Constitutional:       General: She is not in acute distress. Appearance: Normal appearance. She is normal weight. She is not ill-appearing. HENT:      Head: Normocephalic. Right Ear: Hearing, tympanic membrane, ear canal and external ear normal. No middle ear effusion. There is no impacted cerumen. Tympanic membrane is not erythematous or bulging. Left Ear: Hearing, tympanic membrane, ear canal and external ear normal.  No middle ear effusion.  There is no impacted cerumen. Tympanic membrane is not erythematous or bulging. Nose: Congestion present. No rhinorrhea. Right Sinus: No maxillary sinus tenderness or frontal sinus tenderness. Left Sinus: No maxillary sinus tenderness or frontal sinus tenderness. Mouth/Throat:      Lips: Pink. Mouth: Mucous membranes are moist.      Pharynx: Oropharynx is clear. Uvula midline. Posterior oropharyngeal erythema present. No pharyngeal swelling, oropharyngeal exudate or uvula swelling. Tonsils: No tonsillar exudate or tonsillar abscesses. 1+ on the right. 1+ on the left. Eyes:      General:         Right eye: No discharge. Left eye: No discharge. Conjunctiva/sclera: Conjunctivae normal.   Cardiovascular:      Rate and Rhythm: Normal rate and regular rhythm. Pulses: Normal pulses. Heart sounds: Normal heart sounds. No murmur heard. No friction rub. No gallop. Pulmonary:      Effort: Pulmonary effort is normal. No respiratory distress. Breath sounds: Normal breath sounds. No stridor. No wheezing, rhonchi or rales. Musculoskeletal:      Cervical back: Normal range of motion and neck supple. No tenderness. Lymphadenopathy:      Cervical: No cervical adenopathy. Skin:     General: Skin is warm. Findings: No rash. Neurological:      Mental Status: She is alert.    Psychiatric:         Mood and Affect: Mood normal.         Behavior: Behavior normal.

## 2023-12-12 ENCOUNTER — RA CDI HCC (OUTPATIENT)
Dept: OTHER | Facility: HOSPITAL | Age: 58
End: 2023-12-12

## 2023-12-12 NOTE — PROGRESS NOTES
[address] [facility] coding opportunities       Chart reviewed, no opportunity found: CHART REVIEWED, NO OPPORTUNITY FOUND        Patients Insurance        Commercial Insurance: Raul Serrano

## 2023-12-13 ENCOUNTER — TELEPHONE (OUTPATIENT)
Dept: FAMILY MEDICINE CLINIC | Facility: CLINIC | Age: 58
End: 2023-12-13

## 2023-12-13 DIAGNOSIS — R53.83 OTHER FATIGUE: Primary | ICD-10-CM

## 2023-12-13 DIAGNOSIS — N95.1 MENOPAUSAL SYMPTOMS: ICD-10-CM

## 2023-12-13 RX ORDER — PROGESTERONE 100 MG/1
100 CAPSULE ORAL
Qty: 90 CAPSULE | Refills: 3 | Status: SHIPPED | OUTPATIENT
Start: 2023-12-13

## 2023-12-15 ENCOUNTER — APPOINTMENT (OUTPATIENT)
Dept: LAB | Age: 58
End: 2023-12-15
Payer: COMMERCIAL

## 2023-12-15 DIAGNOSIS — N39.0 RECURRENT UTI: ICD-10-CM

## 2023-12-15 DIAGNOSIS — E78.2 MIXED HYPERLIPIDEMIA: ICD-10-CM

## 2023-12-15 DIAGNOSIS — R53.83 OTHER FATIGUE: ICD-10-CM

## 2023-12-15 LAB
BACTERIA UR QL AUTO: ABNORMAL /HPF
BASOPHILS # BLD AUTO: 0 THOUSANDS/ÂΜL (ref 0–0.1)
BASOPHILS NFR BLD AUTO: 0 % (ref 0–1)
BILIRUB UR QL STRIP: NEGATIVE
CHOLEST SERPL-MCNC: 242 MG/DL
CLARITY UR: ABNORMAL
COLOR UR: YELLOW
EOSINOPHIL # BLD AUTO: 0 THOUSAND/ÂΜL (ref 0–0.61)
EOSINOPHIL NFR BLD AUTO: 0 % (ref 0–6)
ERYTHROCYTE [DISTWIDTH] IN BLOOD BY AUTOMATED COUNT: 13.5 % (ref 11.6–15.1)
GLUCOSE UR STRIP-MCNC: NEGATIVE MG/DL
HCT VFR BLD AUTO: 39.8 % (ref 34.8–46.1)
HDLC SERPL-MCNC: 70 MG/DL
HGB BLD-MCNC: 12.6 G/DL (ref 11.5–15.4)
HGB UR QL STRIP.AUTO: ABNORMAL
HYALINE CASTS #/AREA URNS LPF: ABNORMAL /LPF
IMM GRANULOCYTES # BLD AUTO: 0.02 THOUSAND/UL (ref 0–0.2)
IMM GRANULOCYTES NFR BLD AUTO: 0 % (ref 0–2)
KETONES UR STRIP-MCNC: NEGATIVE MG/DL
LDLC SERPL CALC-MCNC: 154 MG/DL (ref 0–100)
LEUKOCYTE ESTERASE UR QL STRIP: ABNORMAL
LYMPHOCYTES # BLD AUTO: 1.8 THOUSANDS/ÂΜL (ref 0.6–4.47)
LYMPHOCYTES NFR BLD AUTO: 36 % (ref 14–44)
MCH RBC QN AUTO: 28.7 PG (ref 26.8–34.3)
MCHC RBC AUTO-ENTMCNC: 31.7 G/DL (ref 31.4–37.4)
MCV RBC AUTO: 91 FL (ref 82–98)
MONOCYTES # BLD AUTO: 0.46 THOUSAND/ÂΜL (ref 0.17–1.22)
MONOCYTES NFR BLD AUTO: 9 % (ref 4–12)
MUCOUS THREADS UR QL AUTO: ABNORMAL
NEUTROPHILS # BLD AUTO: 2.76 THOUSANDS/ÂΜL (ref 1.85–7.62)
NEUTS SEG NFR BLD AUTO: 55 % (ref 43–75)
NITRITE UR QL STRIP: POSITIVE
NON-SQ EPI CELLS URNS QL MICRO: ABNORMAL /HPF
NONHDLC SERPL-MCNC: 172 MG/DL
NRBC BLD AUTO-RTO: 0 /100 WBCS
PH UR STRIP.AUTO: 6.5 [PH]
PLATELET # BLD AUTO: 268 THOUSANDS/UL (ref 149–390)
PMV BLD AUTO: 10 FL (ref 8.9–12.7)
PROT UR STRIP-MCNC: ABNORMAL MG/DL
RBC # BLD AUTO: 4.39 MILLION/UL (ref 3.81–5.12)
RBC #/AREA URNS AUTO: ABNORMAL /HPF
SP GR UR STRIP.AUTO: 1.02 (ref 1–1.03)
TRIGL SERPL-MCNC: 90 MG/DL
UROBILINOGEN UR STRIP-ACNC: <2 MG/DL
WBC # BLD AUTO: 5.04 THOUSAND/UL (ref 4.31–10.16)
WBC #/AREA URNS AUTO: ABNORMAL /HPF

## 2023-12-15 PROCEDURE — 87086 URINE CULTURE/COLONY COUNT: CPT

## 2023-12-15 PROCEDURE — 36415 COLL VENOUS BLD VENIPUNCTURE: CPT

## 2023-12-15 PROCEDURE — 87077 CULTURE AEROBIC IDENTIFY: CPT

## 2023-12-15 PROCEDURE — 85025 COMPLETE CBC W/AUTO DIFF WBC: CPT

## 2023-12-15 PROCEDURE — 87186 SC STD MICRODIL/AGAR DIL: CPT

## 2023-12-15 PROCEDURE — 80061 LIPID PANEL: CPT

## 2023-12-17 LAB — BACTERIA UR CULT: ABNORMAL

## 2023-12-18 ENCOUNTER — TELEPHONE (OUTPATIENT)
Dept: OTHER | Facility: HOSPITAL | Age: 58
End: 2023-12-18

## 2023-12-18 DIAGNOSIS — N39.0 RECURRENT UTI (URINARY TRACT INFECTION): Primary | ICD-10-CM

## 2023-12-18 RX ORDER — CEPHALEXIN 500 MG/1
500 CAPSULE ORAL EVERY 6 HOURS SCHEDULED
Qty: 20 CAPSULE | Refills: 0 | Status: SHIPPED | OUTPATIENT
Start: 2023-12-18 | End: 2023-12-23

## 2023-12-18 NOTE — TELEPHONE ENCOUNTER
Patient went to get blood work done and also had dark urine and felt like she was having issues so went to get  urine as she had a script. Told her that keflex was sent to pharmacy

## 2023-12-18 NOTE — TELEPHONE ENCOUNTER
----- Message from JEANNIE Goncalves sent at 12/18/2023  8:46 AM EST -----    ----- Message -----  From: Lab, Background User  Sent: 12/16/2023  10:04 AM EST  To: JEANNIE Goncalves

## 2023-12-18 NOTE — TELEPHONE ENCOUNTER
received ucx result. no recent call/symptom triage.    assuming symptomatic i sent her abx. please let her know of results

## 2023-12-19 DIAGNOSIS — N30.00 ACUTE CYSTITIS WITHOUT HEMATURIA: Primary | ICD-10-CM

## 2023-12-20 ENCOUNTER — OFFICE VISIT (OUTPATIENT)
Dept: FAMILY MEDICINE CLINIC | Facility: CLINIC | Age: 58
End: 2023-12-20
Payer: COMMERCIAL

## 2023-12-20 VITALS
HEART RATE: 80 BPM | DIASTOLIC BLOOD PRESSURE: 80 MMHG | OXYGEN SATURATION: 98 % | BODY MASS INDEX: 23.66 KG/M2 | HEIGHT: 65 IN | WEIGHT: 142 LBS | SYSTOLIC BLOOD PRESSURE: 122 MMHG

## 2023-12-20 DIAGNOSIS — Z23 ENCOUNTER FOR IMMUNIZATION: Primary | ICD-10-CM

## 2023-12-20 DIAGNOSIS — N39.0 RECURRENT UTI: ICD-10-CM

## 2023-12-20 PROBLEM — Z80.3 FAMILY HISTORY OF BREAST CANCER: Status: ACTIVE | Noted: 2023-12-20

## 2023-12-20 PROCEDURE — 99396 PREV VISIT EST AGE 40-64: CPT | Performed by: FAMILY MEDICINE

## 2023-12-20 RX ORDER — ESTRADIOL 0.1 MG/G
1 CREAM VAGINAL 3 TIMES WEEKLY
Qty: 42.5 G | Refills: 0 | Status: SHIPPED | OUTPATIENT
Start: 2023-12-20

## 2023-12-26 NOTE — PROGRESS NOTES
"Assessment/Plan: Health maintenance completed.  Blood pressure stable.  Up-to-date with GYN.  Having menopausal symptoms.  Saw Dr. Mendoza menopausal specialist with [facility].  Recommend yearly mammography.  Colonoscopy up-to-date vaccines discussed.  Total cholesterol went from 215 up to 242 with an LDL of 154.  HDL good at 70.  Diet reviewed.  Based on data current risk of ASCVD over the next 10 years is only 2.4%.  Eye doctor up-to-date regarding primary open angle glaucoma.  Aware that I am retiring.  Recommend yearly evaluation.  Labs can be ordered at that time with new provider in the office.  Wish her well.    Health Maintenance  Lifestyle Advice: reduce exposure to stress  Diet: limited junk food  Exercise: infrequently  Dental: regular dental visits  Vision: no vision problems  Preventative Health: Female Preventative: Does monthly breast self examination    No problem-specific Assessment & Plan notes found for this encounter.         Diagnoses and all orders for this visit:    Encounter for immunization                  Subjective:      Patient ID: Tamela Carranza is a 58 y.o. female.    HPI      The following portions of the patient's history were reviewed and updated as appropriate: allergies, current medications, past family history, past medical history, past social history, past surgical history and problem list.    Review of Systems      Objective:    /80   Pulse 80   Ht 5' 5\" (1.651 m)   Wt 64.4 kg (142 lb)   LMP  (LMP Unknown)   SpO2 98%   BMI 23.63 kg/m²          Physical Exam        Sulfa antibiotics    Tamela Carranza had no medications administered during this visit.  Health Maintenance   Topic Date Due    DTaP,Tdap,and Td Vaccines (1 - Tdap) Never done    COVID-19 Vaccine (5 - 2023-24 season) 09/01/2023    Annual Physical  12/19/2023    Cervical Cancer Screening  01/14/2024    Breast Cancer Screening: Mammogram  09/06/2024    Depression Screening  12/20/2024    BMI: Adult "  12/20/2024    Colorectal Cancer Screening  09/15/2026    HIV Screening  Completed    Hepatitis C Screening  Completed    Influenza Vaccine  Completed    Pneumococcal Vaccine: Pediatrics (0 to 5 Years) and At-Risk Patients (6 to 64 Years)  Aged Out    HIB Vaccine  Aged Out    IPV Vaccine  Aged Out    Hepatitis A Vaccine  Aged Out    Meningococcal ACWY Vaccine  Aged Out    HPV Vaccine  Aged Out      Social History     Socioeconomic History    Marital status: /Civil Union     Spouse name: Not on file    Number of children: Not on file    Years of education: Not on file    Highest education level: Not on file   Occupational History    Not on file   Tobacco Use    Smoking status: Never    Smokeless tobacco: Never   Vaping Use    Vaping status: Never Used   Substance and Sexual Activity    Alcohol use: Yes     Comment: social     Drug use: No    Sexual activity: Yes     Partners: Male     Birth control/protection: Male Sterilization   Other Topics Concern    Not on file   Social History Narrative    Not on file     Social Determinants of Health     Financial Resource Strain: Not on file   Food Insecurity: Not on file   Transportation Needs: Not on file   Physical Activity: Not on file   Stress: Not on file   Social Connections: Not on file   Intimate Partner Violence: Not on file   Housing Stability: Not on file      Family History   Problem Relation Age of Onset    Arthritis Mother     Ulcerative colitis Mother     Glaucoma Mother     Seizures Mother     Stroke Mother     Breast cancer Mother 77    Migraines Mother     Parkinsonism Mother     Hypertension Father     Lymphoma Father         non hodkins    Raynaud syndrome Daughter     No Known Problems Daughter     No Known Problems Daughter     No Known Problems Maternal Grandmother     No Known Problems Maternal Grandfather     No Known Problems Paternal Grandmother     No Known Problems Paternal Grandfather     No Known Problems Maternal Aunt     No Known  Problems Paternal Aunt       Past Medical History:   Diagnosis Date    Amenorrhea     Anemia     Cluster headache     Contact dermatitis     last assessed: 2013    Crohn's disease (HCC)     last assessed: 2012. Neg for Crohns based on last GI eval and path.     Degenerative disc disease, cervical     Folliculitis     last assessed: 2013    Headache, tension-type     Hyperlipidemia     IBS (irritable bowel syndrome)     Menorrhagia     Migraine     Vitamin D deficiency disease     last assessed: 2016      has a past surgical history that includes  section; Dilation and curettage of uterus; pr colonoscopy flx dx w/collj spec when pfrmd (N/A, 9/15/2016); Laser ablation of vascular lesion; Esophagogastroduodenoscopy; and Endometrial ablation.   Patient Active Problem List    Diagnosis Date Noted    Family history of breast cancer 2023    Dense breast 2019    Chronic migraine without aura without status migrainosus, not intractable 2018    Gastroesophageal reflux disease without esophagitis 2017    Degenerative cervical disc 2015    Primary open angle glaucoma (POAG) of both eyes 10/16/2013    Irritable bowel syndrome 2012       Current Outpatient Medications:     cholecalciferol (VITAMIN D3) 400 units tablet, Take 400 Units by mouth daily, Disp: , Rfl:     Cyanocobalamin (VITAMIN B12 PO), Take 1 tablet by mouth daily, Disp: , Rfl:     cycloSPORINE (RESTASIS) 0.05 % ophthalmic emulsion, Restasis 0.05 % eye drops in a dropperette, Disp: , Rfl:     cyproheptadine (PERIACTIN) 4 mg tablet, Take 1 tab qhs prn for weather related headache, Disp: 30 tablet, Rfl: 0    dicyclomine (BENTYL) 20 mg tablet, Take 1 tablet (20 mg total) by mouth 3 (three) times a day as needed (abdominal cramping), Disp: 30 tablet, Rfl: 0    estradiol (ESTRACE VAGINAL) 0.1 mg/g vaginal cream, Insert 1 g into the vagina 3 (three) times a week, Disp: 42.5 g, Rfl: 0    estradiol (VIVELLE-DOT)  0.05 MG/24HR, PLACE 1 PATCH ON THE SKIN 2 TIMES A WEEK., Disp: 8 patch, Rfl: 11    latanoprost (XALATAN) 0.005 % ophthalmic solution, 1 drop daily at bedtime, Disp: , Rfl:     magnesium oxide (MAG-OX) 400 mg, Take 250 mg by mouth daily, Disp: , Rfl:     methenamine hippurate (HIPREX) 1 g tablet, Take 1 tablet (1 g total) by mouth 2 (two) times a day with meals, Disp: 180 tablet, Rfl: 3    nortriptyline (PAMELOR) 25 mg capsule, Take 1 capsule (25 mg total) by mouth daily at bedtime, Disp: 90 capsule, Rfl: 2    Progesterone 100 MG CAPS, Take 100 mg by mouth at bedtime, Disp: 90 capsule, Rfl: 3    Riboflavin (VITAMIN B-2 PO), Take 1 tablet by mouth daily, Disp: , Rfl:     Varenicline Tartrate (Tyrvaya) 0.03 MG/ACT SOLN, 1 spray into each nostril 2 (two) times a day, Disp: , Rfl:     venlafaxine (EFFEXOR-XR) 75 mg 24 hr capsule, Take 1 capsule (75 mg total) by mouth daily, Disp: 90 capsule, Rfl: 2    frovatriptan (FROVA) 2.5 MG tablet, Take 1 tablet (2.5 mg total) by mouth if needed for migraine If recurs, may repeat after 2 hours. Max of 3 tabs in 24 hours. (Patient not taking: Reported on 10/10/2023), Disp: 9 tablet, Rfl: 1    rimegepant sulfate (NURTEC) 75 mg TBDP, Take 1 tab at onset of headache, max 1 tab in 24 hours (Patient not taking: Reported on 8/8/2023), Disp: 8 tablet, Rfl: 2                Depression Screening and Follow-up Plan: Patient was screened for depression during today's encounter. They screened negative with a PHQ-2 score of 0.

## 2023-12-27 DIAGNOSIS — K58.2 IRRITABLE BOWEL SYNDROME WITH BOTH CONSTIPATION AND DIARRHEA: ICD-10-CM

## 2023-12-27 RX ORDER — DICYCLOMINE HCL 20 MG
20 TABLET ORAL 3 TIMES DAILY PRN
Qty: 30 TABLET | Refills: 0 | Status: SHIPPED | OUTPATIENT
Start: 2023-12-27

## 2024-01-09 ENCOUNTER — TELEPHONE (OUTPATIENT)
Dept: NEUROLOGY | Facility: CLINIC | Age: 59
End: 2024-01-09

## 2024-01-15 ENCOUNTER — OFFICE VISIT (OUTPATIENT)
Dept: NEUROLOGY | Facility: CLINIC | Age: 59
End: 2024-01-15
Payer: COMMERCIAL

## 2024-01-15 VITALS
HEART RATE: 82 BPM | HEIGHT: 65 IN | DIASTOLIC BLOOD PRESSURE: 84 MMHG | TEMPERATURE: 99.1 F | WEIGHT: 156 LBS | OXYGEN SATURATION: 96 % | SYSTOLIC BLOOD PRESSURE: 122 MMHG | BODY MASS INDEX: 25.99 KG/M2

## 2024-01-15 DIAGNOSIS — G43.709 CHRONIC MIGRAINE WITHOUT AURA WITHOUT STATUS MIGRAINOSUS, NOT INTRACTABLE: Primary | ICD-10-CM

## 2024-01-15 DIAGNOSIS — R06.83 SNORING: ICD-10-CM

## 2024-01-15 PROCEDURE — 99214 OFFICE O/P EST MOD 30 MIN: CPT | Performed by: NURSE PRACTITIONER

## 2024-01-15 RX ORDER — KETOROLAC TROMETHAMINE 10 MG/1
10 TABLET, FILM COATED ORAL EVERY 6 HOURS PRN
Qty: 20 TABLET | Refills: 0 | Status: SHIPPED | OUTPATIENT
Start: 2024-01-15

## 2024-01-15 RX ORDER — CYPROHEPTADINE HYDROCHLORIDE 4 MG/1
TABLET ORAL
Qty: 30 TABLET | Refills: 0 | Status: SHIPPED | OUTPATIENT
Start: 2024-01-15

## 2024-01-15 RX ORDER — VENLAFAXINE HYDROCHLORIDE 37.5 MG/1
37.5 CAPSULE, EXTENDED RELEASE ORAL DAILY
Qty: 90 CAPSULE | Refills: 2 | Status: SHIPPED | OUTPATIENT
Start: 2024-01-15

## 2024-01-15 RX ORDER — KETOROLAC TROMETHAMINE 10 MG/1
10 TABLET, FILM COATED ORAL EVERY 6 HOURS PRN
COMMUNITY
End: 2024-01-15 | Stop reason: SDUPTHER

## 2024-01-15 NOTE — PATIENT INSTRUCTIONS
Increase venlafaxine add 37.5mg daily to 75 mg (total of 112.5 mg daily)    At onset of headache take ubrelvy, can repeat dose in 2 hours if needed-get  copay card online     Continue all other meds for now.     If this fails consider botox or CGRP injectable     Referral for sleep study, sleep medicine for assessment of sleep apnea. [phone] to schedule

## 2024-01-15 NOTE — PROGRESS NOTES
Patient ID: Tamela Carranza is a 58 y.o. female.    Assessment/Plan:    Chronic migraine without aura without status migrainosus, not intractable  Patient with recent worsening of headaches, having approximately 2-3 headaches per week, headaches have been lasting 1-2 days. More recently her abortive medication has not been as effective. She did start hormone replacement with obgyn, however worsening headaches started prior to this. She responded well to venlafaxine in the past so will further increase, will change nurtec to ubrelvy to see if more effective. She has experience drowsiness with several triptans (frovatriptan, ritzatriptan) however medication is effective so may consider switching back to this if needed. Higher doses of nortriptyline caused dry mouth.     She does have daytime fatigue, snoring, given her ongoing migraines will refer for sleep study to assess for sleep apnea.       Plan:   Preventative:   nortriptyline 25 mg 1 tab qhs  Increase venlafaxine  to  112.5 (37.5 mg and 75 mg) mg qd  Vitamin b2  Magnesium oxide 400 mg daily        Abortive:  At onset of headache take urbelvy 100 mg may repeat dose in 2 hours if needed  May use toradol is headache persists   cyproheptadine prn for weather related headaches    If she does not note any improvement with the above change may consider botox or CGRP inhibitor.       Plan for follow up in 3-4 months. To contact the office sooner with any concerns or worsening symptoms.       Diagnoses and all orders for this visit:    Chronic migraine without aura without status migrainosus, not intractable  -     Ambulatory Referral to Sleep Medicine; Future  -     venlafaxine (EFFEXOR-XR) 37.5 mg 24 hr capsule; Take 1 capsule (37.5 mg total) by mouth daily Take in addition to 75 mg capsule (total of 112.5 mg daily)  -     Ubrogepant (UBRELVY) 100 MG tablet; Take 1 tablet (100 mg) one time as needed for migraine. May repeat one additional tablet (100 mg) at least two  "hours after the first dose. Do not use more than two doses per day, or for more than eight days per month.  -     ketorolac (TORADOL) 10 mg tablet; Take 1 tablet (10 mg total) by mouth every 6 (six) hours as needed for moderate pain As needed  -     cyproheptadine (PERIACTIN) 4 mg tablet; Take 1 tab qhs prn for weather related headache    Snoring  -     Ambulatory Referral to Sleep Medicine; Future    Other orders  -     Discontinue: ketorolac (TORADOL) 10 mg tablet; Take 10 mg by mouth every 6 (six) hours as needed for moderate pain As needed           Subjective:    HPI    Patient is a 57 y/o F who is here as a follow up for chronic migraines.     Last office 7/2023 in which she was started on nurtec        Interval History:  In sept/oct she started to have more frequent migraines. She found her abortive medications (toradol, nurtec, cyproheptadine) to be less effective. Prior to this she felt they were working and would relieve her headache within several hours.   Currently she has about 2-3 migraines per week since that time. 1 headache per week is severe and other are moderate.   She started progesterone and estradiol patch in oct however worsening headaches started prior to this, did note her abortive meds were less effective around the time frame these meds were started.     She is still under stress with taking care of things with her family-parents and in laws.    Sleep has been more interrupted with urinary frequency-seeing obgyn     Prior hx   Location: temples , parietal bilaterally     Duration: the rest of the day, has gone into the next day on rare occasion.      Timing: varies     Description: pounding, feels like head is in a \"vice\", Rates: severe-8/10   moderate 4-5/10 typically will treat at this point      Associated symptoms: photophobia, phonophobia, vomiting-rare, pins and needles on the temple rarely, neck tension/pain     She has had facial droop in the past with headaches, but has not had " "this in 10 years.      Aura: none     Triggers: Weather at times, stress     Lifestyle: 2-3 cups of coffee daily, 3-4 bottles of water daily, 4-6 hours nightly sometimes can have trouble sleeping, she has morning grogginess with melatonin, has snoring and daytime somnolence      Denies vision changes, dizziness, no worsening with coughing/sneezing/exercise/position change.     Current medications:  Preventative:   nortriptyline 25 mg 1 tab qhs  venlafaxine 75 mg qd  Vitamin b2  Magnesium oxide 400 mg daily        Abortive:  Frovatriptan-can make her drowsy, so will only take it the evening  toradol  motrin   cyproheptadine prn for weather related headaches     prior medicaitons:  topamax, nortriptyline, magnesium, b2     maxalt  Rizatriptan 10 mg-was changed due to insurance      The following portions of the patient's history were reviewed and updated as appropriate: allergies, current medications, past family history, past medical history, past social history, past surgical history and problem list.         Objective:    Blood pressure 122/84, pulse 82, temperature 99.1 °F (37.3 °C), temperature source Temporal, height 5' 5\" (1.651 m), weight 70.8 kg (156 lb), SpO2 96%, not currently breastfeeding.    Physical Exam  Constitutional:       General: She is awake.   HENT:      Right Ear: Hearing normal.      Left Ear: Hearing normal.   Eyes:      General: Lids are normal.      Extraocular Movements: Extraocular movements intact.      Pupils: Pupils are equal, round, and reactive to light.   Neurological:      Mental Status: She is alert.      Motor: Motor strength is normal.     Deep Tendon Reflexes:      Reflex Scores:       Bicep reflexes are 2+ on the right side and 2+ on the left side.       Brachioradialis reflexes are 2+ on the right side and 2+ on the left side.       Patellar reflexes are 2+ on the right side and 2+ on the left side.       Achilles reflexes are 2+ on the right side and 2+ on the left " side.  Psychiatric:         Speech: Speech normal.         Neurological Exam  Mental Status  Awake and alert. Oriented to person, place, time and situation. Speech is normal. Language is fluent with no aphasia.    Cranial Nerves  CN II: Visual fields full to confrontation.  CN III, IV, VI: Extraocular movements intact bilaterally. Normal lids and orbits bilaterally. Pupils equal round and reactive to light bilaterally.  CN V:  Right: Facial sensation is normal.  Left: Facial sensation is normal on the left.  CN VII:  Right: There is no facial weakness.  Left: There is no facial weakness.  CN VIII:  Right: Hearing is normal.  Left: Hearing is normal.  CN IX, X: Palate elevates symmetrically  CN XI:  Right: Trapezius strength is normal.  Left: Trapezius strength is normal.  CN XII: Tongue midline without atrophy or fasciculations.    Motor   Strength is 5/5 throughout all four extremities.    Sensory  Light touch is normal in upper and lower extremities. Temperature is normal in upper and lower extremities.     Reflexes                                            Right                      Left  Brachioradialis                    2+                         2+  Biceps                                 2+                         2+  Patellar                                2+                         2+  Achilles                                2+                         2+    Coordination  Right: Finger-to-nose normal.Left: Finger-to-nose normal.    Gait  Casual gait is normal including stance, stride, and arm swing. Able to rise from chair without using arms.      I have personally reviewed the ROS performed by the MA.    ROS:    Review of Systems   Constitutional:  Negative for appetite change, fatigue and fever.   HENT: Negative.  Negative for hearing loss, tinnitus, trouble swallowing and voice change.    Eyes: Negative.  Negative for photophobia, pain and visual disturbance.   Respiratory: Negative.  Negative for  shortness of breath.    Cardiovascular: Negative.  Negative for palpitations.   Gastrointestinal: Negative.  Negative for nausea and vomiting.   Endocrine: Negative.  Negative for cold intolerance.   Genitourinary: Negative.  Negative for dysuria, frequency and urgency.   Musculoskeletal:  Negative for back pain, gait problem, myalgias, neck pain and neck stiffness.   Skin: Negative.  Negative for rash.   Allergic/Immunologic: Negative.    Neurological:  Positive for headaches. Negative for dizziness, tremors, seizures, syncope, facial asymmetry, speech difficulty, weakness, light-headedness and numbness.   Hematological: Negative.  Does not bruise/bleed easily.   Psychiatric/Behavioral: Negative.  Negative for confusion, hallucinations and sleep disturbance.    All other systems reviewed and are negative.

## 2024-01-16 DIAGNOSIS — G47.33 OSA (OBSTRUCTIVE SLEEP APNEA): Primary | ICD-10-CM

## 2024-01-16 NOTE — ASSESSMENT & PLAN NOTE
Patient with recent worsening of headaches, having approximately 2-3 headaches per week, headaches have been lasting 1-2 days. More recently her abortive medication has not been as effective. She did start hormone replacement with obgyn, however worsening headaches started prior to this. She responded well to venlafaxine in the past so will further increase, will change nurtec to ubrelvy to see if more effective. She has experience drowsiness with several triptans (frovatriptan, ritzatriptan) however medication is effective so may consider switching back to this if needed. Higher doses of nortriptyline caused dry mouth.     She does have daytime fatigue, snoring, given her ongoing migraines will refer for sleep study to assess for sleep apnea.       Plan:   Preventative:   nortriptyline 25 mg 1 tab qhs  Increase venlafaxine  to  112.5 (37.5 mg and 75 mg) mg qd  Vitamin b2  Magnesium oxide 400 mg daily        Abortive:  At onset of headache take urbelvy 100 mg may repeat dose in 2 hours if needed  May use toradol is headache persists   cyproheptadine prn for weather related headaches    If she does not note any improvement with the above change may consider botox or CGRP inhibitor.       Plan for follow up in 3-4 months. To contact the office sooner with any concerns or worsening symptoms.

## 2024-01-18 ENCOUNTER — PATIENT MESSAGE (OUTPATIENT)
Dept: NEUROLOGY | Facility: CLINIC | Age: 59
End: 2024-01-18

## 2024-01-18 ENCOUNTER — TELEPHONE (OUTPATIENT)
Dept: NEUROLOGY | Facility: CLINIC | Age: 59
End: 2024-01-18

## 2024-01-18 NOTE — TELEPHONE ENCOUNTER
----- Message from Brittani Blanchard RN sent at 1/18/2024  8:07 AM EST -----  Regarding: FW: DAMIAN   Contact: [phone]    ----- Message -----  From: Tamela Carranza  Sent: 1/18/2024   8:01 AM EST  To: [facility]  Subject: UBRELVY                                          Good Morning,  I was just wondering what the status I on my prescription of UBRELVY?   My pharmacy said they need a pre cert and they were sending a request to your office for that.     Thank you ,  Tamela Carranza

## 2024-01-19 NOTE — TELEPHONE ENCOUNTER
Submitted Ubrelvy PA on [facility]  Key# [id number]  Will await insurance determination  Sent My Chart message back to patient.

## 2024-01-25 ENCOUNTER — TELEPHONE (OUTPATIENT)
Dept: FAMILY MEDICINE CLINIC | Facility: CLINIC | Age: 59
End: 2024-01-25

## 2024-01-25 DIAGNOSIS — Z82.49 FAMILY HISTORY OF CARDIOVASCULAR DISEASE: Primary | ICD-10-CM

## 2024-02-07 DIAGNOSIS — N39.0 RECURRENT UTI: ICD-10-CM

## 2024-02-08 RX ORDER — ESTRADIOL 0.1 MG/G
1 CREAM VAGINAL 3 TIMES WEEKLY
Qty: 42.5 G | Refills: 0 | Status: SHIPPED | OUTPATIENT
Start: 2024-02-09

## 2024-02-10 ENCOUNTER — OFFICE VISIT (OUTPATIENT)
Dept: URGENT CARE | Age: 59
End: 2024-02-10
Payer: COMMERCIAL

## 2024-02-10 VITALS
TEMPERATURE: 98.2 F | HEART RATE: 94 BPM | SYSTOLIC BLOOD PRESSURE: 124 MMHG | RESPIRATION RATE: 18 BRPM | DIASTOLIC BLOOD PRESSURE: 77 MMHG | OXYGEN SATURATION: 96 %

## 2024-02-10 DIAGNOSIS — J06.9 ACUTE URI: Primary | ICD-10-CM

## 2024-02-10 PROCEDURE — 99213 OFFICE O/P EST LOW 20 MIN: CPT

## 2024-02-10 RX ORDER — BENZONATATE 200 MG/1
200 CAPSULE ORAL 3 TIMES DAILY PRN
Qty: 20 CAPSULE | Refills: 0 | Status: SHIPPED | OUTPATIENT
Start: 2024-02-10

## 2024-02-10 NOTE — PROGRESS NOTES
[facility]        NAME: Tamela Carranza is a 58 y.o. female  : 1965    MRN: [id number]  DATE: February 10, 2024  TIME: 11:13 AM    Assessment and Plan   Acute URI [J06.9]  1. Acute URI  benzonatate (TESSALON) 200 MG capsule      Uri symptoms- taking dayquil and using flonase.bothersome cough- worse at night.  No pain, SOB or wheezing. No fevers. BS clear. Declined covid testing. Discussed viral symptom treatment and symptom management.       Patient Instructions       Follow up with PCP as needed    Chief Complaint     Chief Complaint   Patient presents with    Cough     Pt started with sx a week ago. Achy, started with a cough a few days later. Lots of congestion, can't sleep.          History of Present Illness       Uri symptoms- taking dayquil and using flonase.bothersome cough- worse at night.  No pain, SOB or wheezing. No fevers. BS clear. Declined covid testing. Discussed viral symptom treatment and symptom management.     Cough  Associated symptoms include rhinorrhea. Pertinent negatives include no chills, fever, shortness of breath or wheezing.       Review of Systems   Review of Systems   Constitutional:  Negative for chills, fatigue and fever.   HENT:  Positive for congestion and rhinorrhea.    Respiratory:  Positive for cough. Negative for shortness of breath and wheezing.          Current Medications       Current Outpatient Medications:     benzonatate (TESSALON) 200 MG capsule, Take 1 capsule (200 mg total) by mouth 3 (three) times a day as needed for cough, Disp: 20 capsule, Rfl: 0    cholecalciferol (VITAMIN D3) 400 units tablet, Take 400 Units by mouth daily, Disp: , Rfl:     Cyanocobalamin (VITAMIN B12 PO), Take 1 tablet by mouth daily, Disp: , Rfl:     cycloSPORINE (RESTASIS) 0.05 % ophthalmic emulsion, Restasis 0.05 % eye drops in a dropperette, Disp: , Rfl:     dicyclomine (BENTYL) 20 mg tablet, Take 1 tablet (20 mg total) by mouth 3 (three) times a day as needed (abdominal  cramping), Disp: 30 tablet, Rfl: 0    estradiol (ESTRACE VAGINAL) 0.1 mg/g vaginal cream, Insert 1 g into the vagina 3 (three) times a week, Disp: 42.5 g, Rfl: 0    estradiol (VIVELLE-DOT) 0.05 MG/24HR, PLACE 1 PATCH ON THE SKIN 2 TIMES A WEEK., Disp: 8 patch, Rfl: 11    latanoprost (XALATAN) 0.005 % ophthalmic solution, 1 drop daily at bedtime, Disp: , Rfl:     magnesium oxide (MAG-OX) 400 mg, Take 250 mg by mouth daily, Disp: , Rfl:     methenamine hippurate (HIPREX) 1 g tablet, Take 1 tablet (1 g total) by mouth 2 (two) times a day with meals, Disp: 180 tablet, Rfl: 3    nortriptyline (PAMELOR) 25 mg capsule, Take 1 capsule (25 mg total) by mouth daily at bedtime, Disp: 90 capsule, Rfl: 2    Progesterone 100 MG CAPS, Take 100 mg by mouth at bedtime, Disp: 90 capsule, Rfl: 3    Riboflavin (VITAMIN B-2 PO), Take 1 tablet by mouth daily, Disp: , Rfl:     Ubrogepant (UBRELVY) 100 MG tablet, Take 1 tablet (100 mg) one time as needed for migraine. May repeat one additional tablet (100 mg) at least two hours after the first dose. Do not use more than two doses per day, or for more than eight days per month., Disp: 8 tablet, Rfl: 11    Varenicline Tartrate (Tyrvaya) 0.03 MG/ACT SOLN, 1 spray into each nostril 2 (two) times a day, Disp: , Rfl:     venlafaxine (EFFEXOR-XR) 37.5 mg 24 hr capsule, Take 1 capsule (37.5 mg total) by mouth daily Take in addition to 75 mg capsule (total of 112.5 mg daily), Disp: 90 capsule, Rfl: 2    venlafaxine (EFFEXOR-XR) 75 mg 24 hr capsule, Take 1 capsule (75 mg total) by mouth daily, Disp: 90 capsule, Rfl: 2    cyproheptadine (PERIACTIN) 4 mg tablet, Take 1 tab qhs prn for weather related headache (Patient not taking: Reported on 2/10/2024), Disp: 30 tablet, Rfl: 0    frovatriptan (FROVA) 2.5 MG tablet, Take 1 tablet (2.5 mg total) by mouth if needed for migraine If recurs, may repeat after 2 hours. Max of 3 tabs in 24 hours. (Patient not taking: Reported on 10/10/2023), Disp: 9 tablet,  Rfl: 1    ketorolac (TORADOL) 10 mg tablet, Take 1 tablet (10 mg total) by mouth every 6 (six) hours as needed for moderate pain As needed (Patient not taking: Reported on 2/10/2024), Disp: 20 tablet, Rfl: 0    Current Allergies     Allergies as of 02/10/2024 - Reviewed 02/10/2024   Allergen Reaction Noted    Sulfa antibiotics  2016            The following portions of the patient's history were reviewed and updated as appropriate: allergies, current medications, past family history, past medical history, past social history, past surgical history and problem list.     Past Medical History:   Diagnosis Date    Amenorrhea     Anemia     Cluster headache     Contact dermatitis     last assessed: 2013    Crohn's disease (HCC)     last assessed: 2012. Neg for Crohns based on last GI eval and path.     Degenerative disc disease, cervical     Folliculitis     last assessed: 2013    Headache, tension-type     Hyperlipidemia     IBS (irritable bowel syndrome)     Menorrhagia     Migraine     Vitamin D deficiency disease     last assessed: 2016       Past Surgical History:   Procedure Laterality Date     SECTION      DILATION AND CURETTAGE OF UTERUS      ENDOMETRIAL ABLATION      ESOPHAGOGASTRODUODENOSCOPY      LASER ABLATION OF VASCULAR LESION      CA COLONOSCOPY FLX DX W/COLLJ SPEC WHEN PFRMD N/A 9/15/2016    Procedure: COLONOSCOPY;  Surgeon: Gloria Madison DO;  Location: [facility];  Service: Gastroenterology       Family History   Problem Relation Age of Onset    Arthritis Mother     Ulcerative colitis Mother     Glaucoma Mother     Seizures Mother     Stroke Mother     Breast cancer Mother 77    Migraines Mother     Parkinsonism Mother     Hypertension Father     Lymphoma Father         non hodkins    Raynaud syndrome Daughter     No Known Problems Daughter     No Known Problems Daughter     No Known Problems Maternal Grandmother     No Known Problems Maternal Grandfather     No  Known Problems Paternal Grandmother     No Known Problems Paternal Grandfather     No Known Problems Maternal Aunt     No Known Problems Paternal Aunt          Medications have been verified.        Objective   /77   Pulse 94   Temp 98.2 °F (36.8 °C) (Tympanic)   Resp 18   LMP  (LMP Unknown)   SpO2 96%   No LMP recorded (lmp unknown). Patient is postmenopausal.       Physical Exam     Physical Exam  Vitals reviewed.   Constitutional:       Appearance: Normal appearance.   HENT:      Right Ear: Tympanic membrane normal.      Left Ear: Tympanic membrane normal.      Nose: Congestion and rhinorrhea present.      Mouth/Throat:      Pharynx: Posterior oropharyngeal erythema present.   Cardiovascular:      Rate and Rhythm: Normal rate and regular rhythm.      Pulses: Normal pulses.      Heart sounds: Normal heart sounds.   Pulmonary:      Effort: Pulmonary effort is normal.      Breath sounds: Normal breath sounds.   Lymphadenopathy:      Cervical: No cervical adenopathy.   Neurological:      Mental Status: She is alert.

## 2024-02-20 ENCOUNTER — TELEPHONE (OUTPATIENT)
Dept: FAMILY MEDICINE CLINIC | Facility: CLINIC | Age: 59
End: 2024-02-20

## 2024-02-20 NOTE — TELEPHONE ENCOUNTER
I placed a new order 24- this is an active order. There is an old  order in there as well which I cannot delete cause I didn't place it. But there is an updated order in

## 2024-02-20 NOTE — TELEPHONE ENCOUNTER
Pt called stating that she was unable to reschedule her CT coronary calcium score . Central Scheduling informed her that the order was cancelled and that she requires a new order to be put through. Please call pt when new script is put in so she may reschedule.

## 2024-02-20 NOTE — TELEPHONE ENCOUNTER
Spoke with scheduling, the patient had cancelled the test because she was sick. It says the referral is closed, but when she tries to reschedule it does let her. She told me to have patient call tomorrow and reschedule.

## 2024-02-27 ENCOUNTER — HOSPITAL ENCOUNTER (OUTPATIENT)
Dept: SLEEP CENTER | Facility: CLINIC | Age: 59
Discharge: HOME | End: 2024-02-27
Payer: COMMERCIAL

## 2024-02-27 DIAGNOSIS — G47.33 OSA (OBSTRUCTIVE SLEEP APNEA): ICD-10-CM

## 2024-02-27 PROCEDURE — G0399 HOME SLEEP TEST/TYPE 3 PORTA: HCPCS

## 2024-02-27 NOTE — PROGRESS NOTES
Home Sleep Study Documentation    HOME STUDY DEVICE: Noxturnal no                                           Jeana G3 yes      Pre-Sleep Home Study:    Set-up and instructions performed by: Francisca    Technician performed demonstration for Patient: yes    Return demonstration performed by Patient: yes    Written instructions provided to Patient: yes    Patient signed consent form: yes        Post-Sleep Home Study:    Additional comments by Patient:       Home Sleep Study Failed:no:    Failure reason: N/A    Reported or Detected: N/A    Scored by: DEEDEE Miles

## 2024-02-28 PROBLEM — G47.33 OSA (OBSTRUCTIVE SLEEP APNEA): Status: ACTIVE | Noted: 2024-02-28

## 2024-03-03 PROCEDURE — G0399 HOME SLEEP TEST/TYPE 3 PORTA: HCPCS | Performed by: PSYCHIATRY & NEUROLOGY

## 2024-03-05 ENCOUNTER — HOSPITAL ENCOUNTER (OUTPATIENT)
Dept: CT IMAGING | Facility: HOSPITAL | Age: 59
Discharge: HOME | End: 2024-03-05
Payer: COMMERCIAL

## 2024-03-05 DIAGNOSIS — Z82.49 FAMILY HISTORY OF CARDIOVASCULAR DISEASE: ICD-10-CM

## 2024-03-05 PROCEDURE — 75571 CT HRT W/O DYE W/CA TEST: CPT

## 2024-03-05 PROCEDURE — G1004 CDSM NDSC: HCPCS

## 2024-03-15 ENCOUNTER — OFFICE VISIT (OUTPATIENT)
Dept: UROLOGY | Facility: CLINIC | Age: 59
End: 2024-03-15
Payer: COMMERCIAL

## 2024-03-15 VITALS
DIASTOLIC BLOOD PRESSURE: 90 MMHG | SYSTOLIC BLOOD PRESSURE: 140 MMHG | HEIGHT: 65 IN | BODY MASS INDEX: 25.49 KG/M2 | HEART RATE: 87 BPM | WEIGHT: 153 LBS | OXYGEN SATURATION: 98 %

## 2024-03-15 DIAGNOSIS — N39.0 RECURRENT UTI: Primary | ICD-10-CM

## 2024-03-15 PROCEDURE — 99213 OFFICE O/P EST LOW 20 MIN: CPT | Performed by: PHYSICIAN ASSISTANT

## 2024-03-15 RX ORDER — CEPHALEXIN 250 MG/1
CAPSULE ORAL
Qty: 30 CAPSULE | Refills: 1 | Status: SHIPPED | OUTPATIENT
Start: 2024-03-15 | End: 2025-03-15

## 2024-03-15 NOTE — PATIENT INSTRUCTIONS
[employer]- [employer] d-mannose 2,000mg powder  1 scoop per day  can double scoop on irritated days    continue estrogen, pre/probiotics, methenamine      keflex 250mg capsule take one capsule within one hour of sexual activity if needed

## 2024-03-15 NOTE — PROGRESS NOTES
"3/15/2024      Chief Complaint   Patient presents with    Recurrent Uti's         Assessment and Plan    58 y.o. female managed by AP team    Recurrent UTI  Currently asymptomatic  Continue prevention tools- estrace 3x a week, hiprex, cranberry, probiotic, vitamin C  add keflex 250mg postcoital PRN dosing        History of Present Illness  Tamela Carranza is a 58 y.o. female here for evaluation of recurrent UTI few years- feels well today. Uses estrace three times weekly, hiprex twice daily and home probiotics and cranberry. Had a UTI episode with her typical symptoms- frequency, urgency, odor-  100k pansensitive e.coli in December treated with keflex and improved. She is sexually active. Bowels OK. No hematuria.        Review of Systems   Constitutional: Negative.    Respiratory: Negative.     Cardiovascular: Negative.    Genitourinary:  Negative for decreased urine volume, difficulty urinating, dysuria, flank pain, frequency, hematuria and urgency.   Musculoskeletal: Negative.                 Vitals  Vitals:    03/15/24 1544   BP: 140/90   BP Location: Left arm   Patient Position: Sitting   Cuff Size: Standard   Pulse: 87   SpO2: 98%   Weight: 69.4 kg (153 lb)   Height: 5' 5\" (1.651 m)       Physical Exam  Vitals and nursing note reviewed.   Constitutional:       General: She is not in acute distress.     Appearance: Normal appearance. She is well-developed. She is not diaphoretic.   HENT:      Head: Normocephalic and atraumatic.   Pulmonary:      Effort: Pulmonary effort is normal.   Musculoskeletal:      Right lower leg: No edema.      Left lower leg: No edema.   Skin:     General: Skin is warm and dry.   Neurological:      General: No focal deficit present.      Mental Status: She is alert and oriented to person, place, and time.   Psychiatric:         Mood and Affect: Mood normal.         Speech: Speech normal.         Behavior: Behavior normal.           Past History  Past Medical History:   Diagnosis Date    " Amenorrhea     Anemia     Cluster headache     Contact dermatitis     last assessed: 6/17/2013    Crohn's disease (HCC)     last assessed: 8/8/2012. Neg for Crohns based on last GI eval and path.     Degenerative disc disease, cervical     Folliculitis     last assessed: 7/30/2013    Headache, tension-type     Hyperlipidemia     IBS (irritable bowel syndrome)     Menorrhagia     Migraine     Vitamin D deficiency disease     last assessed: 5/17/2016     Social History     Socioeconomic History    Marital status: /Civil Union     Spouse name: None    Number of children: None    Years of education: None    Highest education level: None   Occupational History    None   Tobacco Use    Smoking status: Never    Smokeless tobacco: Never   Vaping Use    Vaping status: Never Used   Substance and Sexual Activity    Alcohol use: Yes     Comment: social     Drug use: No    Sexual activity: Yes     Partners: Male     Birth control/protection: Male Sterilization   Other Topics Concern    None   Social History Narrative    None     Social Determinants of Health     Financial Resource Strain: Not on file   Food Insecurity: Not on file   Transportation Needs: Not on file   Physical Activity: Not on file   Stress: Not on file   Social Connections: Not on file   Intimate Partner Violence: Not on file   Housing Stability: Not on file     Social History     Tobacco Use   Smoking Status Never   Smokeless Tobacco Never     Family History   Problem Relation Age of Onset    Arthritis Mother     Ulcerative colitis Mother     Glaucoma Mother     Seizures Mother     Stroke Mother     Breast cancer Mother 77    Migraines Mother     Parkinsonism Mother     Hypertension Father     Lymphoma Father         non hodkins    Raynaud syndrome Daughter     No Known Problems Daughter     No Known Problems Daughter     No Known Problems Maternal Grandmother     No Known Problems Maternal Grandfather     No Known Problems Paternal Grandmother     No  "Known Problems Paternal Grandfather     No Known Problems Maternal Aunt     No Known Problems Paternal Aunt        The following portions of the patient's history were reviewed and updated as appropriate: allergies, current medications, past medical history, past social history, past surgical history and problem list.    Results  No results found for this or any previous visit (from the past 1 hour(s)).]  No results found for: \"PSA\"  Lab Results   Component Value Date    CALCIUM 9.4 12/13/2022    K 4.1 12/13/2022    CO2 28 12/13/2022     12/13/2022    BUN 19 12/13/2022    CREATININE 0.72 12/13/2022     Lab Results   Component Value Date    WBC 5.04 12/15/2023    HGB 12.6 12/15/2023    HCT 39.8 12/15/2023    MCV 91 12/15/2023     12/15/2023       "

## 2024-03-23 PROBLEM — N39.0 RECURRENT UTI: Status: ACTIVE | Noted: 2024-03-23

## 2024-03-24 NOTE — ASSESSMENT & PLAN NOTE
Currently asymptomatic  Continue prevention tools- estrace 3x a week, hiprex, cranberry, probiotic, vitamin C  add keflex 250mg postcoital PRN dosing

## 2024-04-01 ENCOUNTER — OFFICE VISIT (OUTPATIENT)
Dept: FAMILY MEDICINE CLINIC | Facility: CLINIC | Age: 59
End: 2024-04-01

## 2024-04-01 DIAGNOSIS — G43.709 CHRONIC MIGRAINE WITHOUT AURA WITHOUT STATUS MIGRAINOSUS, NOT INTRACTABLE: ICD-10-CM

## 2024-04-01 DIAGNOSIS — J02.9 VIRAL PHARYNGITIS: Primary | ICD-10-CM

## 2024-04-01 RX ORDER — PREDNISONE 10 MG/1
TABLET ORAL
Qty: 21 TABLET | Refills: 0 | Status: SHIPPED | OUTPATIENT
Start: 2024-04-01

## 2024-04-01 RX ORDER — VALACYCLOVIR HYDROCHLORIDE 1 G/1
1000 TABLET, FILM COATED ORAL 2 TIMES DAILY
Qty: 10 TABLET | Refills: 0 | Status: SHIPPED | OUTPATIENT
Start: 2024-04-01 | End: 2024-04-06

## 2024-04-01 RX ORDER — VENLAFAXINE HYDROCHLORIDE 37.5 MG/1
37.5 CAPSULE, EXTENDED RELEASE ORAL DAILY
Qty: 90 CAPSULE | Refills: 0 | Status: CANCELLED | OUTPATIENT
Start: 2024-04-01

## 2024-04-01 NOTE — PROGRESS NOTES
Name: Tamela Carranza      : 1965      MRN: [id number]  Encounter Provider: Alyce Melendrez DO  Encounter Date: 2024   Encounter department: [facility]    Assessment & Plan     1. Viral pharyngitis  -     predniSONE 10 mg tablet; Take PO 6-5-4-3-2-1  -     valACYclovir (VALTREX) 1,000 mg tablet; Take 1 tablet (1,000 mg total) by mouth 2 (two) times a day for 5 days    Severity overall consistent with viral etiology.  Prednisone taper as well as Valtrex recommended with continued hydration, soft diet and follow-up if needed.       Subjective      Normal here for symptoms as listed    URI   This is a new problem. The current episode started in the past 7 days. There has been no fever. Associated symptoms include congestion, ear pain, a plugged ear sensation and a sore throat. Pertinent negatives include no headaches or joint pain. Associated symptoms comments: Does have history of cold sores.   All  Chief Complaint   Patient presents with   • Mass     Lump on the roof of her mouth , noticed it starting Friday , now its larger and more painful       Review of Systems   HENT:  Positive for congestion, ear pain and sore throat.    Musculoskeletal:  Negative for joint pain.   Neurological:  Negative for headaches.       Current Outpatient Medications on File Prior to Visit   Medication Sig   • cholecalciferol (VITAMIN D3) 400 units tablet Take 400 Units by mouth daily   • Cyanocobalamin (VITAMIN B12 PO) Take 1 tablet by mouth daily   • cycloSPORINE (RESTASIS) 0.05 % ophthalmic emulsion Restasis 0.05 % eye drops in a dropperette   • dicyclomine (BENTYL) 20 mg tablet Take 1 tablet (20 mg total) by mouth 3 (three) times a day as needed (abdominal cramping)   • estradiol (ESTRACE VAGINAL) 0.1 mg/g vaginal cream Insert 1 g into the vagina 3 (three) times a week   • estradiol (VIVELLE-DOT) 0.05 MG/24HR PLACE 1 PATCH ON THE SKIN 2 TIMES A WEEK.   • latanoprost (XALATAN) 0.005 % ophthalmic  "solution 1 drop daily at bedtime   • magnesium oxide (MAG-OX) 400 mg Take 250 mg by mouth daily   • methenamine hippurate (HIPREX) 1 g tablet Take 1 tablet (1 g total) by mouth 2 (two) times a day with meals   • nortriptyline (PAMELOR) 25 mg capsule Take 1 capsule (25 mg total) by mouth daily at bedtime   • Progesterone 100 MG CAPS Take 100 mg by mouth at bedtime   • Riboflavin (VITAMIN B-2 PO) Take 1 tablet by mouth daily   • Ubrogepant (UBRELVY) 100 MG tablet Take 1 tablet (100 mg) one time as needed for migraine. May repeat one additional tablet (100 mg) at least two hours after the first dose. Do not use more than two doses per day, or for more than eight days per month.   • Varenicline Tartrate (Tyrvaya) 0.03 MG/ACT SOLN 1 spray into each nostril 2 (two) times a day   • venlafaxine (EFFEXOR-XR) 37.5 mg 24 hr capsule Take 1 capsule (37.5 mg total) by mouth daily Take in addition to 75 mg capsule (total of 112.5 mg daily)       Objective     /68   Pulse 93   Temp 97.8 °F (36.6 °C) (Temporal)   Ht 5' 5\" (1.651 m)   Wt 68.9 kg (152 lb)   LMP  (LMP Unknown)   SpO2 99%   BMI 25.29 kg/m²     Physical Exam  Vitals reviewed.   Constitutional:       Appearance: Normal appearance.   HENT:      Right Ear: Tympanic membrane is not bulging.      Left Ear: Tympanic membrane is not bulging.      Nose: Rhinorrhea present.      Mouth/Throat:      Mouth: Mucous membranes are moist.      Pharynx: Posterior oropharyngeal erythema (Slight) present.      Comments: Area of questionable ulcerative change in soft palate posterior  Cardiovascular:      Rate and Rhythm: Normal rate and regular rhythm.      Heart sounds: Normal heart sounds.   Pulmonary:      Effort: Pulmonary effort is normal.      Breath sounds: Normal breath sounds.   Lymphadenopathy:      Cervical: No cervical adenopathy.   Neurological:      Mental Status: She is alert and oriented to person, place, and time.       Alyce Melendrez, DO    "

## 2024-04-03 NOTE — TELEPHONE ENCOUNTER
Per pt, she needs script for Effexor 75 mg to be sent to pharmacy.    Next OV 5/31/24 with Gregoria.    Last script sent 7/5/2023. Quantity: 90. Refills: 2.    Gregoria - Rx entered. Please review and sign if in agreement.

## 2024-04-04 RX ORDER — VENLAFAXINE HYDROCHLORIDE 75 MG/1
75 CAPSULE, EXTENDED RELEASE ORAL DAILY
Qty: 90 CAPSULE | Refills: 2 | Status: SHIPPED | OUTPATIENT
Start: 2024-04-04

## 2024-04-05 ENCOUNTER — VBI (OUTPATIENT)
Dept: ADMINISTRATIVE | Facility: OTHER | Age: 59
End: 2024-04-05

## 2024-04-09 ENCOUNTER — NURSE TRIAGE (OUTPATIENT)
Age: 59
End: 2024-04-09

## 2024-04-09 DIAGNOSIS — R39.9 UTI SYMPTOMS: Primary | ICD-10-CM

## 2024-04-09 NOTE — TELEPHONE ENCOUNTER
Patient called in after submitting [employer] message and being asked to call in. Patient feels she has a UTI. Reports frequency and foul smelling urine. Denies fever, hematuria, pain, N/V. Ordered urine testing. Reviewed ED precautions.           Answer Questions - Initial Assessment  When did your symptoms start: Friday    Is burning with every void: no    Do you have any other urinary symptoms, urinary frequency, urgency, incontinence: normal frequency      Have you seen blood in your urine: no    Do you have any abdominal pain or flank pain: no    Do you have a fever of 101 or higher: no    Do you have a history of urinary tract infections: Yes:     Have you had any recent urine testing: no    Have you had a recent urologic procedure or surgery: no    Protocols used: Dysuria

## 2024-04-10 ENCOUNTER — APPOINTMENT (OUTPATIENT)
Dept: LAB | Age: 59
End: 2024-04-10
Payer: COMMERCIAL

## 2024-04-10 DIAGNOSIS — R39.9 UTI SYMPTOMS: ICD-10-CM

## 2024-04-10 LAB
BACTERIA UR QL AUTO: ABNORMAL /HPF
BILIRUB UR QL STRIP: NEGATIVE
CLARITY UR: ABNORMAL
COLOR UR: YELLOW
GLUCOSE UR STRIP-MCNC: NEGATIVE MG/DL
HGB UR QL STRIP.AUTO: NEGATIVE
KETONES UR STRIP-MCNC: NEGATIVE MG/DL
LEUKOCYTE ESTERASE UR QL STRIP: ABNORMAL
MUCOUS THREADS UR QL AUTO: ABNORMAL
NITRITE UR QL STRIP: POSITIVE
NON-SQ EPI CELLS URNS QL MICRO: ABNORMAL /HPF
PH UR STRIP.AUTO: 7 [PH]
PROT UR STRIP-MCNC: ABNORMAL MG/DL
RBC #/AREA URNS AUTO: ABNORMAL /HPF
SP GR UR STRIP.AUTO: 1.01 (ref 1–1.03)
TRANS CELLS #/AREA URNS HPF: PRESENT /[HPF]
UROBILINOGEN UR STRIP-ACNC: <2 MG/DL
WBC #/AREA URNS AUTO: ABNORMAL /HPF

## 2024-04-10 PROCEDURE — 87077 CULTURE AEROBIC IDENTIFY: CPT

## 2024-04-10 PROCEDURE — 81001 URINALYSIS AUTO W/SCOPE: CPT

## 2024-04-10 PROCEDURE — 87086 URINE CULTURE/COLONY COUNT: CPT

## 2024-04-10 PROCEDURE — 87186 SC STD MICRODIL/AGAR DIL: CPT

## 2024-04-11 ENCOUNTER — TELEPHONE (OUTPATIENT)
Dept: OTHER | Facility: HOSPITAL | Age: 59
End: 2024-04-11

## 2024-04-11 DIAGNOSIS — N39.0 RECURRENT UTI (URINARY TRACT INFECTION): Primary | ICD-10-CM

## 2024-04-11 RX ORDER — CEPHALEXIN 500 MG/1
500 CAPSULE ORAL EVERY 6 HOURS SCHEDULED
Qty: 20 CAPSULE | Refills: 0 | Status: SHIPPED | OUTPATIENT
Start: 2024-04-11 | End: 2024-04-16

## 2024-04-11 NOTE — TELEPHONE ENCOUNTER
Urine culture returned positive for infection.  Sensitivities are not yet available.  Based on past cultures, Keflex will be sent to patient's pharmacy.  Take 1 tablet every 6 hours (4 tablets daily) for 5 days.  Please reach out to office if symptoms continue.

## 2024-04-11 NOTE — TELEPHONE ENCOUNTER
Called patient to inform her that her urine culture came back positive for infection. Informed the pt that Keflex was sent to her pharmacy. Informed her to reach out to office if symptoms continue. Patient confirmed understanding and had no further questions.

## 2024-04-11 NOTE — TELEPHONE ENCOUNTER
----- Message from Sita Walker MA sent at 4/11/2024  2:24 PM EDT -----  Regarding: FW: Possible UTI  Contact: [phone]    ----- Message -----  From: Tamela Carranza  Sent: 4/11/2024   2:15 PM EDT  To: [facility]  Subject: Possible UTI                                     Hi,   I was wondering if you got the results from my urinalysis.     Thank you ,  Tamela Carranza

## 2024-04-12 LAB — BACTERIA UR CULT: ABNORMAL

## 2024-04-13 VITALS
HEIGHT: 65 IN | TEMPERATURE: 97.8 F | DIASTOLIC BLOOD PRESSURE: 68 MMHG | OXYGEN SATURATION: 99 % | SYSTOLIC BLOOD PRESSURE: 112 MMHG | HEART RATE: 93 BPM | BODY MASS INDEX: 25.33 KG/M2 | WEIGHT: 152 LBS

## 2024-05-01 PROBLEM — N39.0 RECURRENT UTI: Status: RESOLVED | Noted: 2024-03-23 | Resolved: 2024-05-01

## 2024-05-02 DIAGNOSIS — N39.0 RECURRENT UTI: ICD-10-CM

## 2024-05-03 RX ORDER — METHENAMINE HIPPURATE 1000 MG/1
1 TABLET ORAL 2 TIMES DAILY WITH MEALS
Qty: 180 TABLET | Refills: 1 | Status: SHIPPED | OUTPATIENT
Start: 2024-05-03

## 2024-06-03 ENCOUNTER — OFFICE VISIT (OUTPATIENT)
Dept: NEUROLOGY | Facility: CLINIC | Age: 59
End: 2024-06-03
Payer: COMMERCIAL

## 2024-06-03 ENCOUNTER — TELEPHONE (OUTPATIENT)
Dept: NEUROLOGY | Facility: CLINIC | Age: 59
End: 2024-06-03

## 2024-06-03 VITALS
SYSTOLIC BLOOD PRESSURE: 118 MMHG | DIASTOLIC BLOOD PRESSURE: 70 MMHG | OXYGEN SATURATION: 98 % | WEIGHT: 150.4 LBS | TEMPERATURE: 95.5 F | HEART RATE: 80 BPM | BODY MASS INDEX: 25.03 KG/M2

## 2024-06-03 DIAGNOSIS — G47.33 OSA (OBSTRUCTIVE SLEEP APNEA): ICD-10-CM

## 2024-06-03 DIAGNOSIS — G43.709 CHRONIC MIGRAINE WITHOUT AURA WITHOUT STATUS MIGRAINOSUS, NOT INTRACTABLE: Primary | ICD-10-CM

## 2024-06-03 PROCEDURE — 96372 THER/PROPH/DIAG INJ SC/IM: CPT | Performed by: NURSE PRACTITIONER

## 2024-06-03 PROCEDURE — 99214 OFFICE O/P EST MOD 30 MIN: CPT | Performed by: NURSE PRACTITIONER

## 2024-06-03 RX ORDER — KETOROLAC TROMETHAMINE 30 MG/ML
60 INJECTION, SOLUTION INTRAMUSCULAR; INTRAVENOUS ONCE
Status: COMPLETED | OUTPATIENT
Start: 2024-06-03 | End: 2024-06-03

## 2024-06-03 RX ORDER — CYPROHEPTADINE HYDROCHLORIDE 4 MG/1
TABLET ORAL
Qty: 30 TABLET | Refills: 0 | Status: SHIPPED | OUTPATIENT
Start: 2024-06-03

## 2024-06-03 RX ORDER — GALCANEZUMAB 120 MG/ML
INJECTION, SOLUTION SUBCUTANEOUS
Qty: 1 ML | Refills: 11 | Status: SHIPPED | OUTPATIENT
Start: 2024-06-03

## 2024-06-03 RX ORDER — GALCANEZUMAB 120 MG/ML
INJECTION, SOLUTION SUBCUTANEOUS
Qty: 2 ML | Refills: 0 | Status: SHIPPED | OUTPATIENT
Start: 2024-06-03

## 2024-06-03 RX ORDER — NORTRIPTYLINE HYDROCHLORIDE 25 MG/1
25 CAPSULE ORAL
Qty: 90 CAPSULE | Refills: 2 | Status: SHIPPED | OUTPATIENT
Start: 2024-06-03

## 2024-06-03 RX ADMIN — KETOROLAC TROMETHAMINE 60 MG: 30 INJECTION, SOLUTION INTRAMUSCULAR; INTRAVENOUS at 11:03

## 2024-06-03 NOTE — PROGRESS NOTES
Patient ID: Tamela Carranza is a 58 y.o. female.    Assessment/Plan:    ANTONIETTA (obstructive sleep apnea)  Mild sleep apnea noted on home sleep study, she has follow-up with sleep medicine next week to further discuss.    Chronic migraine without aura without status migrainosus, not intractable  Patient's migraines only mildly improved with recent adjustments to her medication (increase in venlafaxine).  She continues to have 1-2 migraines per week, she has had mild daily headaches some of which can be sinus related.  She has found Ubrelvy effective within several hours as abortive medication.  She has had a migraine for the past 7 days, did offer Toradol injection or short course of steroids.  Toradol injection given in the office today. She will contact the office if no improvement of her headache over the next 24-48 hours or any worsening of her headache. We did discuss potential adjustments to her medication and giving her ongoing headaches she would like to consider CGRP injectable medication. She does have follow up with sleep medicine for possible sleep apnea.     Plan:   Preventative:  Start emgality    nortriptyline 25 mg 1 tab qhs   venlafaxine  112.5 (37.5 mg and 75 mg) mg qd  Vitamin b2  Magnesium oxide 400 mg daily        Abortive:  At onset of headache take urbelvy 100 mg may repeat dose in 2 hours if needed  May use toradol is headache persists   cyproheptadine prn for weather related headaches       Plan for follow up in 4-6 months. To contact the office sooner with any concerns or worsening symptoms.       Diagnoses and all orders for this visit:    Chronic migraine without aura without status migrainosus, not intractable  -     ketorolac (TORADOL) 60 mg/2 mL IM injection 60 mg  -     cyproheptadine (PERIACTIN) 4 mg tablet; Take 1 tab qhs prn for weather related headaches  -     Galcanezumab-gnlm (Emgality) 120 MG/ML SOSY; Inject 2 ml under the skin once  -     Galcanezumab-gnlm (Emgality) 120 MG/ML  "SOAJ; Inject 1 ml under the skin monthly  -     nortriptyline (PAMELOR) 25 mg capsule; Take 1 capsule (25 mg total) by mouth daily at bedtime    ANTONIETTA (obstructive sleep apnea)           Subjective:    HPI  Patient is a 57 y/o F who is here as a follow up for chronic migraines.     Last office 1/2024 in which she was switched to ubrelvy, venlafaxine was increased.         Interval History:  She did have a home sleep study which showed possible mild sleep apnea. She will be seeing sleep medicine next week.     She feels the previous med changes did help less the frequency and severity of headaches.   1-2 migraines per week, ubrelvy is helpful within a few hours.   She did feel she had some sinus headaches with weather change more frequently but were short lived. She feels she has a mild headache almost daily -3-4/10.         Crawford headaches were ok until last week, has had headache for the last 8 days rates 7/10, ubrelvy hasn't been as helpful.     Bending over, coughing, sneezing, laughing does not cause headache.  No new symptoms with headache.       Prior hx   Location: temples , parietal bilaterally     Duration: the rest of the day, has gone into the next day on rare occasion.      Timing: varies     Description: pounding, feels like head is in a \"vice\", Rates: severe-8/10   moderate 4-5/10 typically will treat at this point      Associated symptoms: photophobia, phonophobia, vomiting-rare, pins and needles on the temple rarely, neck tension/pain     She has had facial droop in the past with headaches, but has not had this in 10 years.      Aura: none     Triggers: Weather at times, stress     Lifestyle: 2-3 cups of coffee daily, 3-4 bottles of water daily, 4-6 hours nightly sometimes can have trouble sleeping, she has morning grogginess with melatonin, has snoring and daytime somnolence      Denies vision changes, dizziness, no worsening with coughing/sneezing/exercise/position change.     Current " medications:  Preventative:   nortriptyline 25 mg 1 tab qhs  venlafaxine 112 mg qd   Vitamin b2 200 mg   Magnesium  oxide 400 mg daily        Abortive:  Frovatriptan-can make her drowsy, so will only take it the evening  toradol  motrin   cyproheptadine prn for weather related headaches     prior medicaitons:  topamax, nortriptyline, magnesium, b2     maxalt  Rizatriptan 10 mg-was changed due to insurance    The following portions of the patient's history were reviewed and updated as appropriate: allergies, current medications, past family history, past medical history, past social history, past surgical history and problem list.          Objective:    Blood pressure 118/70, pulse 80, temperature (!) 95.5 °F (35.3 °C), temperature source Temporal, weight 68.2 kg (150 lb 6.4 oz), SpO2 98%, not currently breastfeeding.    Physical Exam  Constitutional:       General: She is awake.   HENT:      Right Ear: Hearing normal.      Left Ear: Hearing normal.   Eyes:      General: Lids are normal.      Extraocular Movements: Extraocular movements intact.      Pupils: Pupils are equal, round, and reactive to light.   Neurological:      Mental Status: She is alert.      Motor: Motor strength is normal.     Deep Tendon Reflexes:      Reflex Scores:       Bicep reflexes are 2+ on the right side and 2+ on the left side.       Brachioradialis reflexes are 2+ on the right side and 2+ on the left side.       Patellar reflexes are 2+ on the right side and 2+ on the left side.       Achilles reflexes are 2+ on the right side and 2+ on the left side.  Psychiatric:         Speech: Speech normal.       Neurological Exam  Mental Status  Awake and alert. Oriented to person, place, time and situation. Speech is normal. Language is fluent with no aphasia.    Cranial Nerves  CN II: Visual fields full to confrontation.  CN III, IV, VI: Extraocular movements intact bilaterally. Normal lids and orbits bilaterally. Pupils equal round and reactive  to light bilaterally.  CN V:  Right: Facial sensation is normal.  Left: Facial sensation is normal on the left.  CN VII:  Right: There is no facial weakness.  Left: There is no facial weakness.  CN VIII:  Right: Hearing is normal.  Left: Hearing is normal.  CN IX, X: Palate elevates symmetrically  CN XI:  Right: Trapezius strength is normal.  Left: Trapezius strength is normal.  CN XII: Tongue midline without atrophy or fasciculations.    Motor   Strength is 5/5 throughout all four extremities.    Sensory  Light touch is normal in upper and lower extremities. Temperature is normal in upper and lower extremities.     Reflexes                                            Right                      Left  Brachioradialis                    2+                         2+  Biceps                                 2+                         2+  Patellar                                2+                         2+  Achilles                                2+                         2+    Coordination  Right: Finger-to-nose normal.Left: Finger-to-nose normal.    Gait  Casual gait is normal including stance, stride, and arm swing. Able to rise from chair without using arms.      I have personally reviewed the ROS performed by the MA.   ROS:    Review of Systems  Review of Systems   Constitutional:  Negative for appetite change, fatigue and fever.   HENT: Negative.  Negative for hearing loss, tinnitus, trouble swallowing and voice change.    Eyes: Negative.  Negative for photophobia, pain and visual disturbance.   Respiratory: Negative.  Negative for shortness of breath.    Cardiovascular: Negative.  Negative for palpitations.   Gastrointestinal: Negative.  Negative for nausea and vomiting.   Endocrine: Negative.  Negative for cold intolerance.   Genitourinary: Negative.  Negative for dysuria, frequency and urgency.   Musculoskeletal:  Negative for back pain, gait problem, myalgias, neck pain and neck stiffness.   Skin: Negative.   Negative for rash.   Allergic/Immunologic: Negative.    Neurological:  Positive for headaches. Negative for dizziness, tremors, seizures, syncope, facial asymmetry, speech difficulty, weakness, light-headedness and numbness.   Hematological: Negative.  Does not bruise/bleed easily.   Psychiatric/Behavioral: Negative.  Negative for confusion, hallucinations and sleep disturbance.    All other systems reviewed and are negative.

## 2024-06-03 NOTE — ASSESSMENT & PLAN NOTE
Mild sleep apnea noted on home sleep study, she has follow-up with sleep medicine next week to further discuss.

## 2024-06-03 NOTE — ASSESSMENT & PLAN NOTE
Patient's migraines only mildly improved with recent adjustments to her medication (increase in venlafaxine).  She continues to have 1-2 migraines per week, she has had mild daily headaches some of which can be sinus related.  She has found Ubrelvy effective within several hours as abortive medication.  She has had a migraine for the past 7 days, did offer Toradol injection or short course of steroids.  Toradol injection given in the office today. She will contact the office if no improvement of her headache over the next 24-48 hours or any worsening of her headache. We did discuss potential adjustments to her medication and giving her ongoing headaches she would like to consider CGRP injectable medication. She does have follow up with sleep medicine for possible sleep apnea.     Plan:   Preventative:  Start emgality    nortriptyline 25 mg 1 tab qhs   venlafaxine  112.5 (37.5 mg and 75 mg) mg qd  Vitamin b2  Magnesium oxide 400 mg daily        Abortive:  At onset of headache take urbelvy 100 mg may repeat dose in 2 hours if needed  May use toradol is headache persists   cyproheptadine prn for weather related headaches       Plan for follow up in 4-6 months. To contact the office sooner with any concerns or worsening symptoms.

## 2024-06-03 NOTE — PATIENT INSTRUCTIONS
Continue dose of oral medications for now    Will add on emgality-take 2 injections once for the loading dose then 1 injection monthly thereafter

## 2024-06-03 NOTE — TELEPHONE ENCOUNTER
Patient seen in office today, started on emgality (loading and maintenance dose) due to ongoing migraines. Please start prior authorization. Thanks!

## 2024-06-07 ENCOUNTER — APPOINTMENT (OUTPATIENT)
Dept: LAB | Age: 59
End: 2024-06-07
Payer: COMMERCIAL

## 2024-06-07 ENCOUNTER — TELEPHONE (OUTPATIENT)
Age: 59
End: 2024-06-07

## 2024-06-07 DIAGNOSIS — N39.0 RECURRENT UTI: ICD-10-CM

## 2024-06-07 DIAGNOSIS — N39.0 RECURRENT UTI: Primary | ICD-10-CM

## 2024-06-07 LAB
BACTERIA UR QL AUTO: ABNORMAL /HPF
BILIRUB UR QL STRIP: NEGATIVE
CLARITY UR: CLEAR
COLOR UR: YELLOW
GLUCOSE UR STRIP-MCNC: NEGATIVE MG/DL
HGB UR QL STRIP.AUTO: ABNORMAL
KETONES UR STRIP-MCNC: NEGATIVE MG/DL
LEUKOCYTE ESTERASE UR QL STRIP: ABNORMAL
MUCOUS THREADS UR QL AUTO: ABNORMAL
NITRITE UR QL STRIP: POSITIVE
NON-SQ EPI CELLS URNS QL MICRO: ABNORMAL /HPF
PH UR STRIP.AUTO: 6 [PH]
PROT UR STRIP-MCNC: ABNORMAL MG/DL
RBC #/AREA URNS AUTO: ABNORMAL /HPF
SP GR UR STRIP.AUTO: 1.02 (ref 1–1.03)
UROBILINOGEN UR STRIP-ACNC: <2 MG/DL
WBC #/AREA URNS AUTO: ABNORMAL /HPF

## 2024-06-07 PROCEDURE — 87086 URINE CULTURE/COLONY COUNT: CPT

## 2024-06-07 PROCEDURE — 87077 CULTURE AEROBIC IDENTIFY: CPT

## 2024-06-07 PROCEDURE — 81001 URINALYSIS AUTO W/SCOPE: CPT

## 2024-06-07 PROCEDURE — 87186 SC STD MICRODIL/AGAR DIL: CPT

## 2024-06-07 RX ORDER — NITROFURANTOIN 25; 75 MG/1; MG/1
100 CAPSULE ORAL 2 TIMES DAILY
Qty: 10 CAPSULE | Refills: 0 | Status: SHIPPED | OUTPATIENT
Start: 2024-06-07 | End: 2024-06-12

## 2024-06-07 NOTE — TELEPHONE ENCOUNTER
Patient with recurrent urinary symptoms. Patient is having urinary frequency and small amount of incontinence and foul odor to urine and the urine is darker. She is not having any abdominal, flank or pelvic pain. Denies N/V fevers or chills. She is hydrating well with water. I gave her care advice to continue hydrating with water and avoid bladder irritants, placed urine orders and reviewed ER precautions. She will go to a [facility]'s lab today to have specimen processed. Please advise of any further recommendations.

## 2024-06-07 NOTE — TELEPHONE ENCOUNTER
Abx sent in based of last UC. Will continue to monitor current urine studies and adjust if needed.

## 2024-06-07 NOTE — TELEPHONE ENCOUNTER
Contacted Tamela and made her aware that script for Macrobid was sent to her [facility] to start taking after providing a urine sample at the lab. Office to follow up next week on final results.

## 2024-06-09 LAB — BACTERIA UR CULT: ABNORMAL

## 2024-06-10 ENCOUNTER — OFFICE VISIT (OUTPATIENT)
Dept: SLEEP CENTER | Facility: CLINIC | Age: 59
End: 2024-06-10
Payer: COMMERCIAL

## 2024-06-10 VITALS
BODY MASS INDEX: 24.99 KG/M2 | HEART RATE: 94 BPM | SYSTOLIC BLOOD PRESSURE: 119 MMHG | WEIGHT: 150 LBS | DIASTOLIC BLOOD PRESSURE: 68 MMHG | HEIGHT: 65 IN

## 2024-06-10 DIAGNOSIS — G43.709 CHRONIC MIGRAINE WITHOUT AURA WITHOUT STATUS MIGRAINOSUS, NOT INTRACTABLE: ICD-10-CM

## 2024-06-10 DIAGNOSIS — G47.33 OSA (OBSTRUCTIVE SLEEP APNEA): Primary | ICD-10-CM

## 2024-06-10 DIAGNOSIS — R06.83 SNORING: ICD-10-CM

## 2024-06-10 PROCEDURE — 99203 OFFICE O/P NEW LOW 30 MIN: CPT | Performed by: PSYCHIATRY & NEUROLOGY

## 2024-06-10 NOTE — ASSESSMENT & PLAN NOTE
We discussed that her home sleep test showed a very mild case of obstructive sleep apnea.  She notes that she typically sleeps laterally and therefore this result may overestimate disease severity.  Therefore, we decided to repeat a home sleep test and have her sleep in her typical sleeping position.  If the repeat test does not show obstructive sleep apnea then certainly no treatment would be needed.  We discussed that if only mild obstructive sleep apnea is present, then treatment could be considered on the basis to see if it improves headache control.  Otherwise, we can also observe symptoms as she does not describe other comorbidities that would necessitate treating mild obstructive sleep apnea, as she is happy with her sleep quality and does not have daytime sleepiness.

## 2024-06-10 NOTE — PROGRESS NOTES
Assessment/Plan:    1. ANTONIETTA (obstructive sleep apnea)  Assessment & Plan:  We discussed that her home sleep test showed a very mild case of obstructive sleep apnea.  She notes that she typically sleeps laterally and therefore this result may overestimate disease severity.  Therefore, we decided to repeat a home sleep test and have her sleep in her typical sleeping position.  If the repeat test does not show obstructive sleep apnea then certainly no treatment would be needed.  We discussed that if only mild obstructive sleep apnea is present, then treatment could be considered on the basis to see if it improves headache control.  Otherwise, we can also observe symptoms as she does not describe other comorbidities that would necessitate treating mild obstructive sleep apnea, as she is happy with her sleep quality and does not have daytime sleepiness.  2. Chronic migraine without aura without status migrainosus, not intractable  -     Ambulatory Referral to Sleep Medicine  3. Snoring  -     Ambulatory Referral to Sleep Medicine  -     Home Study; Future        Subjective:      Patient ID: Tamela Carranza is a 58 y.o. female.    HPI    Cc- presents after an abnormal sleep study, she has no concern about symptoms     This is a 58-year-old female who presents as a new patient, referred by Gregoria Blackburn after recent an abnormal sleep study.  Past medical history includes chronic migraine, glaucoma.    She had a home sleep test completed in February 2024, that test showed mild obstructive sleep apnea with a respiratory event index of 5.5.  Respiratory events occurred exclusively in the supine position and the patient commented that she usually does not sleep supine, I commented in my impression that that may overestimate disease severity.  Oxygenation was normal on that test.    She goes between 11 and 12, usually falls asleep without difficulty.  She awakens 1-2 times during the night, these are usually not prolonged.  She  "is up around  6 AM, sleeps about 6 hours a night.  When not working, goes to bed 12 am and is up at 8 am     She is sometimes refreshed upon awakening, sometimes is sleepy during the day.  She does not take naps.  She denies drowsy driving.    She is told she snores, denies witnessed apneas or gasping in sleep.   She does not wake self choking, gasping, or snoring.     Works at "[employer] as a [employer], off for the summer.     Migraines now about 10 days this past month.     She denies restless legs, sleepwalking, or dream enactment.        D[place] Sleepiness Scale  Sitting and reading: Would never doze  Watching TV: Slight chance of dozing  Sitting, inactive in a public place (e.g. a theatre or a meeting): Would never doze  As a passenger in a car for an hour without a break: Slight chance of dozing  Lying down to rest in the afternoon when circumstances permit: Would never doze  Sitting and talking to someone: Would never doze  Sitting quietly after a lunch without alcohol: Would never doze  In a car, while stopped for a few minutes in traffic: Would never doze  Total score: 2      Review of Systems  (as above unless noted)    Objective:      Visit Vitals  /68 (BP Location: Left arm, Patient Position: Sitting, Cuff Size: Adult)   Pulse 94   Ht 5' 5\" (1.651 m)   Wt 68 kg (150 lb)   LMP  (LMP Unknown)   BMI 24.96 kg/m²   OB Status Postmenopausal   Smoking Status Never   BSA 1.75 m²             Physical Exam  Constitutional:       Appearance: Normal appearance.   HENT:      Head: Normocephalic and atraumatic.      Mouth/Throat:      Mouth: Mucous membranes are moist.   Eyes:      Extraocular Movements: Extraocular movements intact.      Pupils: Pupils are equal, round, and reactive to light.   Cardiovascular:      Rate and Rhythm: Normal rate.      Pulses: Normal pulses.      Heart sounds: Normal heart sounds.   Pulmonary:      Effort: Pulmonary effort is normal.      Breath sounds: Normal breath " sounds.   Musculoskeletal:      Right lower leg: No edema.      Left lower leg: No edema.   Neurological:      Mental Status: She is alert.   Psychiatric:         Mood and Affect: Mood normal.         Behavior: Behavior normal.         Thought Content: Thought content normal.         Judgment: Judgment normal.       Ramos Tongue Position 4  Tonsil size : not enlarged   tongue : Normal tongue size  palate and uvula: Normal soft palate  Jaw and Chin: no overbite    Nose: Normal nasal structure

## 2024-07-05 ENCOUNTER — OFFICE VISIT (OUTPATIENT)
Dept: FAMILY MEDICINE CLINIC | Facility: CLINIC | Age: 59
End: 2024-07-05
Payer: COMMERCIAL

## 2024-07-05 VITALS
BODY MASS INDEX: 25.16 KG/M2 | SYSTOLIC BLOOD PRESSURE: 128 MMHG | DIASTOLIC BLOOD PRESSURE: 78 MMHG | OXYGEN SATURATION: 99 % | HEIGHT: 65 IN | WEIGHT: 151 LBS | TEMPERATURE: 98 F | HEART RATE: 79 BPM

## 2024-07-05 DIAGNOSIS — H60.522: Primary | ICD-10-CM

## 2024-07-05 PROCEDURE — 99213 OFFICE O/P EST LOW 20 MIN: CPT | Performed by: FAMILY MEDICINE

## 2024-07-05 RX ORDER — NEOMYCIN SULFATE, POLYMYXIN B SULFATE AND HYDROCORTISONE 10; 3.5; 1 MG/ML; MG/ML; [USP'U]/ML
3 SUSPENSION/ DROPS AURICULAR (OTIC) 4 TIMES DAILY
Qty: 10 ML | Refills: 0 | Status: SHIPPED | OUTPATIENT
Start: 2024-07-05

## 2024-07-05 NOTE — PROGRESS NOTES
All ambulatory Visit  Name: Tamela Carranza      : 1965      MRN: [id number]  Encounter Provider: Alyce Melendrez DO  Encounter Date: 2024   Encounter department: [facility]    Assessment & Plan   1. Acute chemical otitis externa of left ear  -     neomycin-polymyxin-hydrocortisone (CORTISPORIN) 0.35%-10,000 units/mL-1% otic suspension; Administer 3 drops into the left ear 4 (four) times a day  Would recommend no other solution in the ear at this time.  Can use earplugs if she is planning on swimming.  Follow-up if any recurrent symptoms of bleed or pain.       History of Present Illness     Patient is a 58-year-old female without any history of ear infection or swimmer's ear presenting with left ear discomfort and bleeding that occurred after having her hair done (color).  This has never happened before after hair treatment.  She feels there could have been some dripping of solution into the ear but it was NOT immediately painful    Earache   There is pain in the left ear. This is a new problem. Episode onset: 2 days ago. Episode frequency: Bleeding essentially was resolved by the next day. There has been no fever. The pain is at a severity of 5/10. The pain is mild. Associated symptoms include ear discharge. Treatments tried: Did not want to put anything in her here without knowing.     Chief Complaint   Patient presents with   • Earache     Lt ear bleeding since  had hair done/color and then noticed  Review of Systems   HENT:  Positive for ear discharge and ear pain.         As noted in HPI   All other systems reviewed and are negative.    Past Medical History:   Diagnosis Date   • Amenorrhea    • Anemia    • Cluster headache    • Contact dermatitis     last assessed: 2013   • Crohn's disease (HCC)     last assessed: 2012. Neg for Crohns based on last GI eval and path.    • Degenerative disc disease, cervical    • Folliculitis     last  assessed: 2013   • Headache, tension-type    • Hyperlipidemia    • IBS (irritable bowel syndrome)    • Menorrhagia    • Migraine    • Vitamin D deficiency disease     last assessed: 2016     Past Surgical History:   Procedure Laterality Date   •  SECTION     • DILATION AND CURETTAGE OF UTERUS     • ENDOMETRIAL ABLATION     • ESOPHAGOGASTRODUODENOSCOPY     • LASER ABLATION OF VASCULAR LESION     • IL COLONOSCOPY FLX DX W/COLLJ SPEC WHEN PFRMD N/A 9/15/2016    Procedure: COLONOSCOPY;  Surgeon: Gloria Madison DO;  Location: [facility];  Service: Gastroenterology     Family History   Problem Relation Age of Onset   • Arthritis Mother    • Ulcerative colitis Mother    • Glaucoma Mother    • Seizures Mother    • Stroke Mother    • Breast cancer Mother 77   • Migraines Mother    • Parkinsonism Mother    • Hypertension Father    • Lymphoma Father         non hodkins   • Raynaud syndrome Daughter    • No Known Problems Daughter    • No Known Problems Daughter    • No Known Problems Maternal Grandmother    • No Known Problems Maternal Grandfather    • No Known Problems Paternal Grandmother    • No Known Problems Paternal Grandfather    • No Known Problems Maternal Aunt    • No Known Problems Paternal Aunt      Social History     Tobacco Use   • Smoking status: Never   • Smokeless tobacco: Never   Vaping Use   • Vaping status: Never Used   Substance and Sexual Activity   • Alcohol use: Yes     Comment: social    • Drug use: No   • Sexual activity: Yes     Partners: Male     Birth control/protection: Male Sterilization     Current Outpatient Medications on File Prior to Visit   Medication Sig   • cholecalciferol (VITAMIN D3) 400 units tablet Take 400 Units by mouth daily   • Cyanocobalamin (VITAMIN B12 PO) Take 1 tablet by mouth daily   • cycloSPORINE (RESTASIS) 0.05 % ophthalmic emulsion Restasis 0.05 % eye drops in a dropperette   • cyproheptadine (PERIACTIN) 4 mg tablet Take 1 tab qhs prn for weather  related headaches   • dicyclomine (BENTYL) 20 mg tablet Take 1 tablet (20 mg total) by mouth 3 (three) times a day as needed (abdominal cramping)   • estradiol (ESTRACE VAGINAL) 0.1 mg/g vaginal cream Insert 1 g into the vagina 3 (three) times a week   • estradiol (VIVELLE-DOT) 0.05 MG/24HR PLACE 1 PATCH ON THE SKIN 2 TIMES A WEEK.   • Galcanezumab-gnlm (Emgality) 120 MG/ML SOSY Inject 2 ml under the skin once   • latanoprost (XALATAN) 0.005 % ophthalmic solution 1 drop daily at bedtime   • magnesium oxide (MAG-OX) 400 mg Take 250 mg by mouth daily   • methenamine hippurate (HIPREX) 1 g tablet TAKE ONE TABLET BY MOUTH TWICE DAILY WITH MEALS.   • nortriptyline (PAMELOR) 25 mg capsule Take 1 capsule (25 mg total) by mouth daily at bedtime   • predniSONE 10 mg tablet Take PO 6-5-4-3-2-1   • Progesterone 100 MG CAPS Take 100 mg by mouth at bedtime   • Riboflavin (VITAMIN B-2 PO) Take 1 tablet by mouth daily   • Ubrogepant (UBRELVY) 100 MG tablet Take 1 tablet (100 mg) one time as needed for migraine. May repeat one additional tablet (100 mg) at least two hours after the first dose. Do not use more than two doses per day, or for more than eight days per month.   • Varenicline Tartrate (Tyrvaya) 0.03 MG/ACT SOLN 1 spray into each nostril 2 (two) times a day   • venlafaxine (EFFEXOR-XR) 37.5 mg 24 hr capsule Take 1 capsule (37.5 mg total) by mouth daily Take in addition to 75 mg capsule (total of 112.5 mg daily)   • venlafaxine (EFFEXOR-XR) 75 mg 24 hr capsule Take 1 capsule (75 mg total) by mouth daily   • Galcanezumab-gnlm (Emgality) 120 MG/ML SOAJ Inject 1 ml under the skin monthly (Patient not taking: Reported on 6/10/2024)   • valACYclovir (VALTREX) 1,000 mg tablet Take 1 tablet (1,000 mg total) by mouth 2 (two) times a day for 5 days     Allergies   Allergen Reactions   • Sulfa Antibiotics      Immunization History   Administered Date(s) Administered   • COVID-19 J&J (Flint) vaccine 0.5 mL 03/13/2021   • COVID-19  "PFIZER VACCINE 0.3 ML IM 11/05/2021   • COVID-19 Pfizer Vac BIVALENT Tony-sucrose 12 Yr+ IM 10/21/2022, 10/21/2022   • INFLUENZA 11/19/2016, 10/22/2021, 10/21/2022, 10/26/2023   • Influenza Injectable, MDCK, Preservative Free, Quadrivalent, 0.5 mL 09/23/2020   • Influenza Quadrivalent, 6-35 Months IM 11/19/2016   • Influenza, seasonal, injectable 01/14/2013, 10/22/2014     Objective     /78   Pulse 79   Temp 98 °F (36.7 °C) (Temporal)   Ht 5' 5\" (1.651 m)   Wt 68.5 kg (151 lb)   LMP  (LMP Unknown)   SpO2 99%   BMI 25.13 kg/m²     Physical Exam  Constitutional:       Appearance: Normal appearance.   HENT:      Right Ear: Tympanic membrane and ear canal normal.      Left Ear: Swelling and tenderness present.      Ears:      Comments: Left ear canal does show area of erythema and previous bleeding site.  No active bleeding currently.  There is some opacity noted in front of the tympanic membrane which could be residual liquid from coloring solution?  Prominent nonbleeding venous structure on posterior wall of canal.  Cardiovascular:      Rate and Rhythm: Normal rate.   Pulmonary:      Effort: Pulmonary effort is normal.   Lymphadenopathy:      Cervical: No cervical adenopathy.   Neurological:      Mental Status: She is alert and oriented to person, place, and time.         "

## 2024-07-06 DIAGNOSIS — G43.709 CHRONIC MIGRAINE WITHOUT AURA WITHOUT STATUS MIGRAINOSUS, NOT INTRACTABLE: ICD-10-CM

## 2024-07-09 NOTE — TELEPHONE ENCOUNTER
Pt requesting refill of Periactin 4mg - 1 tab qHS prn for weather related migraines    LOV - 6/3/2024  Last rx - 6/3/24 for 30 tabs w/zero refills    Script pended below.     Gregoria - please sign if agreeable. Thank you.

## 2024-07-10 RX ORDER — CYPROHEPTADINE HYDROCHLORIDE 4 MG/1
TABLET ORAL
Qty: 30 TABLET | Refills: 0 | Status: SHIPPED | OUTPATIENT
Start: 2024-07-10

## 2024-08-05 ENCOUNTER — APPOINTMENT (OUTPATIENT)
Dept: LAB | Age: 59
End: 2024-08-05
Payer: COMMERCIAL

## 2024-08-05 DIAGNOSIS — R39.9 UTI SYMPTOMS: Primary | ICD-10-CM

## 2024-08-05 DIAGNOSIS — N39.0 RECURRENT UTI: ICD-10-CM

## 2024-08-05 DIAGNOSIS — R39.9 UTI SYMPTOMS: ICD-10-CM

## 2024-08-05 LAB
BACTERIA UR QL AUTO: ABNORMAL /HPF
BILIRUB UR QL STRIP: NEGATIVE
CLARITY UR: CLEAR
COLOR UR: COLORLESS
GLUCOSE UR STRIP-MCNC: NEGATIVE MG/DL
HGB UR QL STRIP.AUTO: NEGATIVE
KETONES UR STRIP-MCNC: NEGATIVE MG/DL
LEUKOCYTE ESTERASE UR QL STRIP: ABNORMAL
NITRITE UR QL STRIP: NEGATIVE
NON-SQ EPI CELLS URNS QL MICRO: ABNORMAL /HPF
PH UR STRIP.AUTO: 6.5 [PH]
PROT UR STRIP-MCNC: NEGATIVE MG/DL
RBC #/AREA URNS AUTO: ABNORMAL /HPF
SP GR UR STRIP.AUTO: 1 (ref 1–1.03)
UROBILINOGEN UR STRIP-ACNC: <2 MG/DL
WBC #/AREA URNS AUTO: ABNORMAL /HPF

## 2024-08-05 PROCEDURE — 87086 URINE CULTURE/COLONY COUNT: CPT

## 2024-08-05 PROCEDURE — 81001 URINALYSIS AUTO W/SCOPE: CPT

## 2024-08-06 LAB — BACTERIA UR CULT: NORMAL

## 2024-08-08 ENCOUNTER — VBI (OUTPATIENT)
Dept: ADMINISTRATIVE | Facility: OTHER | Age: 59
End: 2024-08-08

## 2024-08-08 NOTE — TELEPHONE ENCOUNTER
08/08/24 7:44 AM     Chart reviewed for Pap Smear (HPV) aka Cervical Cancer Screening ; nothing is submitted to the patient's insurance at this time.     ELLE YOUNG MA   [facility]

## 2024-08-20 ENCOUNTER — HOSPITAL ENCOUNTER (OUTPATIENT)
Dept: SLEEP CENTER | Facility: CLINIC | Age: 59
Discharge: HOME | End: 2024-08-20
Payer: COMMERCIAL

## 2024-08-20 DIAGNOSIS — R06.83 SNORING: ICD-10-CM

## 2024-08-20 PROCEDURE — G0399 HOME SLEEP TEST/TYPE 3 PORTA: HCPCS

## 2024-08-20 NOTE — PROGRESS NOTES
Home Sleep Study Documentation    HOME STUDY DEVICE: Noxturnal no                                           Jeana G3 yes device # 17      Pre-Sleep Home Study:    Set-up and instructions performed by: Francisca    Technician performed demonstration for Patient: yes    Return demonstration performed by Patient: yes    Written instructions provided to Patient: yes    Patient signed consent form: yes        Post-Sleep Home Study:    Additional comments by Patient: None    Home Sleep Study Failed:no:    Failure reason: N/A    Reported or Detected: N/A    Scored by: DEEDEE Miles

## 2024-08-25 PROCEDURE — G0399 HOME SLEEP TEST/TYPE 3 PORTA: HCPCS | Performed by: PSYCHIATRY & NEUROLOGY

## 2024-09-09 DIAGNOSIS — K58.2 IRRITABLE BOWEL SYNDROME WITH BOTH CONSTIPATION AND DIARRHEA: ICD-10-CM

## 2024-09-09 NOTE — TELEPHONE ENCOUNTER
Medication: dicyclomine (BENTYL) 20 mg tablet     Dose/Frequency: Take 1 tablet (20 mg total) by mouth 3 (three) times a day as needed (abdominal cramping)     Quantity: 30 tablet     Pharmacy: [id number] - [place] - [id number]   [address] [address]     Office:   [x] PCP/Provider -   [] Speciality/Provider -     Does the patient have enough for 3 days?   [] Yes   [] No - Send as HP to POD

## 2024-09-10 RX ORDER — DICYCLOMINE HCL 20 MG
20 TABLET ORAL 3 TIMES DAILY PRN
Qty: 30 TABLET | Refills: 0 | Status: SHIPPED | OUTPATIENT
Start: 2024-09-10

## 2024-09-25 DIAGNOSIS — G43.709 CHRONIC MIGRAINE WITHOUT AURA WITHOUT STATUS MIGRAINOSUS, NOT INTRACTABLE: ICD-10-CM

## 2024-09-25 RX ORDER — VENLAFAXINE HYDROCHLORIDE 37.5 MG/1
37.5 CAPSULE, EXTENDED RELEASE ORAL DAILY
Qty: 90 CAPSULE | Refills: 1 | Status: SHIPPED | OUTPATIENT
Start: 2024-09-25

## 2024-10-07 ENCOUNTER — ANNUAL EXAM (OUTPATIENT)
Age: 59
End: 2024-10-07
Payer: COMMERCIAL

## 2024-10-07 VITALS
SYSTOLIC BLOOD PRESSURE: 118 MMHG | DIASTOLIC BLOOD PRESSURE: 70 MMHG | WEIGHT: 150.8 LBS | BODY MASS INDEX: 25.12 KG/M2 | HEIGHT: 65 IN

## 2024-10-07 DIAGNOSIS — Z11.51 SCREENING FOR HUMAN PAPILLOMAVIRUS (HPV): ICD-10-CM

## 2024-10-07 DIAGNOSIS — R32 URINARY INCONTINENCE, UNSPECIFIED TYPE: ICD-10-CM

## 2024-10-07 DIAGNOSIS — Z12.4 SCREENING FOR CERVICAL CANCER: ICD-10-CM

## 2024-10-07 DIAGNOSIS — Z01.419 ENCOUNTER FOR ANNUAL ROUTINE GYNECOLOGICAL EXAMINATION: Primary | ICD-10-CM

## 2024-10-07 DIAGNOSIS — Z12.31 SCREENING MAMMOGRAM FOR BREAST CANCER: ICD-10-CM

## 2024-10-07 PROCEDURE — G0145 SCR C/V CYTO,THINLAYER,RESCR: HCPCS | Performed by: OBSTETRICS & GYNECOLOGY

## 2024-10-07 PROCEDURE — S0612 ANNUAL GYNECOLOGICAL EXAMINA: HCPCS | Performed by: OBSTETRICS & GYNECOLOGY

## 2024-10-07 PROCEDURE — G0476 HPV COMBO ASSAY CA SCREEN: HCPCS | Performed by: OBSTETRICS & GYNECOLOGY

## 2024-10-07 NOTE — PROGRESS NOTES
Tamela Carranza   1965    CC:  Yearly exam    S:  58 y.o. female here for yearly exam. She is postmenopausal and has had no vaginal bleeding.  She denies vaginal discharge, itching, odor or dryness.     Was on HRT with [facility], but has since stopped. Felt like she hit a wall, and didn't want to continue to increase dosing.  Still with hot flashes, but managing.  Does continue to use vaginal estrogen in setting of recurrent UTI.     Sexual activity: She is sexually active  - occasional dryness.     She does report urinary incontinence, issues with bladder control - this has been more frustrating to her recently.     No bowel problems, breast concerns.     Last Pap: 1/14/19 - NILM, Neg HPV  Last Mammo:  9/9/23 - BIRad 1  Last Colonoscopy: 9/15/16 - 10yr recall     We reviewed ASCCP guidelines for Pap testing.     Family hx of breast cancer: Mother  Family hx of ovarian cancer: no  Family hx of colon cancer: no      Current Outpatient Medications:     cholecalciferol (VITAMIN D3) 400 units tablet, Take 400 Units by mouth daily, Disp: , Rfl:     Cyanocobalamin (VITAMIN B12 PO), Take 1 tablet by mouth daily, Disp: , Rfl:     cycloSPORINE (RESTASIS) 0.05 % ophthalmic emulsion, Restasis 0.05 % eye drops in a dropperette, Disp: , Rfl:     cyproheptadine (PERIACTIN) 4 mg tablet, TAKE ONE TABLET BY MOUTH AT BEDTIME AS NEEDED for weather related headaches, Disp: 30 tablet, Rfl: 0    dicyclomine (BENTYL) 20 mg tablet, Take 1 tablet (20 mg total) by mouth 3 (three) times a day as needed (abdominal cramping), Disp: 30 tablet, Rfl: 0    estradiol (ESTRACE VAGINAL) 0.1 mg/g vaginal cream, Insert 1 g into the vagina 3 (three) times a week, Disp: 42.5 g, Rfl: 0    Galcanezumab-gnlm (Emgality) 120 MG/ML SOAJ, Inject 1 ml under the skin monthly, Disp: 1 mL, Rfl: 11    latanoprost (XALATAN) 0.005 % ophthalmic solution, 1 drop daily at bedtime, Disp: , Rfl:     magnesium oxide (MAG-OX) 400 mg, Take 250 mg by mouth daily, Disp: , Rfl:      methenamine hippurate (HIPREX) 1 g tablet, TAKE ONE TABLET BY MOUTH TWICE DAILY WITH MEALS., Disp: 180 tablet, Rfl: 1    nortriptyline (PAMELOR) 25 mg capsule, Take 1 capsule (25 mg total) by mouth daily at bedtime, Disp: 90 capsule, Rfl: 2    Riboflavin (VITAMIN B-2 PO), Take 1 tablet by mouth daily, Disp: , Rfl:     Ubrogepant (UBRELVY) 100 MG tablet, Take 1 tablet (100 mg) one time as needed for migraine. May repeat one additional tablet (100 mg) at least two hours after the first dose. Do not use more than two doses per day, or for more than eight days per month., Disp: 8 tablet, Rfl: 11    Varenicline Tartrate (Tyrvaya) 0.03 MG/ACT SOLN, 1 spray into each nostril 2 (two) times a day, Disp: , Rfl:     venlafaxine (EFFEXOR-XR) 37.5 mg 24 hr capsule, Take 1 capsule (37.5 mg total) by mouth daily; Take in addition to 75 mg capsule (total of 112.5 mg daily), Disp: 90 capsule, Rfl: 1    venlafaxine (EFFEXOR-XR) 75 mg 24 hr capsule, Take 1 capsule (75 mg total) by mouth daily, Disp: 90 capsule, Rfl: 2    Galcanezumab-gnlm (Emgality) 120 MG/ML SOSY, Inject 2 ml under the skin once, Disp: 2 mL, Rfl: 0    neomycin-polymyxin-hydrocortisone (CORTISPORIN) 0.35%-10,000 units/mL-1% otic suspension, Administer 3 drops into the left ear 4 (four) times a day, Disp: 10 mL, Rfl: 0    predniSONE 10 mg tablet, Take PO 6-5-4-3-2-1, Disp: 21 tablet, Rfl: 0  Patient Active Problem List   Diagnosis    Chronic migraine without aura without status migrainosus, not intractable    Gastroesophageal reflux disease without esophagitis    Degenerative cervical disc    Irritable bowel syndrome    Dense breast    Primary open angle glaucoma (POAG) of both eyes    Family history of breast cancer    ANTONIETTA (obstructive sleep apnea)     Family History   Problem Relation Age of Onset    Arthritis Mother     Ulcerative colitis Mother     Glaucoma Mother     Seizures Mother     Stroke Mother     Breast cancer Mother 77    Migraines Mother      "Parkinsonism Mother     Cancer Mother         breast    Diabetes Mother     Hypertension Father     Lymphoma Father         non hodkins    Cancer Father         lymphoma    Raynaud syndrome Daughter     No Known Problems Daughter     No Known Problems Daughter     No Known Problems Maternal Grandmother     No Known Problems Maternal Grandfather     No Known Problems Paternal Grandmother     No Known Problems Paternal Grandfather     No Known Problems Maternal Aunt     No Known Problems Paternal Aunt      Past Medical History:   Diagnosis Date    Amenorrhea     Anemia     Cluster headache     Contact dermatitis     last assessed: 6/17/2013    Crohn's disease (HCC)     last assessed: 8/8/2012. Neg for Crohns based on last GI eval and path.     Degenerative disc disease, cervical     Folliculitis     last assessed: 7/30/2013    Headache, tension-type     Hyperlipidemia     IBS (irritable bowel syndrome)     Menorrhagia     Migraine     Vitamin D deficiency disease     last assessed: 5/17/2016        Review of Systems   Respiratory: Negative.    Cardiovascular: Negative.    Gastrointestinal: Negative for constipation and diarrhea.   Genitourinary: Negative for difficulty urinating, pelvic pain, vaginal bleeding, vaginal discharge, itching or odor.    O:  Blood pressure 118/70, height 5' 5\" (1.651 m), weight 68.4 kg (150 lb 12.8 oz), not currently breastfeeding.    Patient appears well and is not in distress  Breasts are symmetrical without mass, tenderness, nipple discharge, skin changes or adenopathy.   Abdomen is soft and nontender without masses.   External genitals are normal without lesions or rashes.  Urethral meatus and urethra are normal  Bladder is normal to palpation  Vagina is normal without discharge or bleeding, generalized atrophic changes present   Cervix is normal without discharge or lesion.   Uterus is normal, mobile, nontender without palpable mass.  Adnexa are normal, nontender, without palpable " mass.     A:  Yearly exam.     P:   Pap & HPV today  Mammo up to date    Colon Cancer Screening up to date    Consider pelvic floor PT - for urinary incontinence.      Reviewed considerations of menopause, to call with any postmenopausal bleeding or other concerns.      RTO one year for yearly exam or sooner as needed.

## 2024-10-08 LAB
HPV HR 12 DNA CVX QL NAA+PROBE: NEGATIVE
HPV16 DNA CVX QL NAA+PROBE: NEGATIVE
HPV18 DNA CVX QL NAA+PROBE: NEGATIVE

## 2024-10-10 ENCOUNTER — TELEPHONE (OUTPATIENT)
Age: 59
End: 2024-10-10

## 2024-10-10 LAB
LAB AP GYN PRIMARY INTERPRETATION: NORMAL
Lab: NORMAL

## 2024-10-28 ENCOUNTER — OFFICE VISIT (OUTPATIENT)
Dept: URGENT CARE | Age: 59
End: 2024-10-28
Payer: COMMERCIAL

## 2024-10-28 VITALS
SYSTOLIC BLOOD PRESSURE: 124 MMHG | TEMPERATURE: 98.7 F | DIASTOLIC BLOOD PRESSURE: 78 MMHG | HEART RATE: 94 BPM | OXYGEN SATURATION: 99 % | RESPIRATION RATE: 18 BRPM

## 2024-10-28 DIAGNOSIS — H66.001 NON-RECURRENT ACUTE SUPPURATIVE OTITIS MEDIA OF RIGHT EAR WITHOUT SPONTANEOUS RUPTURE OF TYMPANIC MEMBRANE: Primary | ICD-10-CM

## 2024-10-28 PROCEDURE — G0382 LEV 3 HOSP TYPE B ED VISIT: HCPCS | Performed by: EMERGENCY MEDICINE

## 2024-10-28 PROCEDURE — S9083 URGENT CARE CENTER GLOBAL: HCPCS | Performed by: EMERGENCY MEDICINE

## 2024-10-28 RX ORDER — NAPROXEN 500 MG/1
500 TABLET ORAL 2 TIMES DAILY WITH MEALS
Qty: 60 TABLET | Refills: 0 | Status: SHIPPED | OUTPATIENT
Start: 2024-10-28 | End: 2024-11-27

## 2024-10-31 NOTE — PROGRESS NOTES
[facility]        NAME: Tamela Carranza is a 59 y.o. female  : 1965    MRN: [id number]  DATE: 2024  TIME: 8:53 AM    Assessment and Plan   Non-recurrent acute suppurative otitis media of right ear without spontaneous rupture of tympanic membrane [H66.001]  1. Non-recurrent acute suppurative otitis media of right ear without spontaneous rupture of tympanic membrane  naproxen (Naprosyn) 500 mg tablet    amoxicillin-clavulanate (AUGMENTIN) 875-125 mg per tablet            Patient Instructions       Follow up with PCP in 3-5 days.  Proceed to  ER if symptoms worsen.    If tests have been performed at [facility], our office will contact you with results if changes need to be made to the care plan discussed with you at the visit.  [facility]    Chief Complaint     Chief Complaint   Patient presents with    Earache     Right sided  Headache  Yesterday was starting to get sick with congestion  Has been using debrox without relief         History of Present Illness       Earache   There is pain in the right ear. This is a new problem. The current episode started in the past 7 days. The problem occurs constantly. The problem has been unchanged. There has been no fever. The pain is at a severity of 5/10. The pain is moderate. Associated symptoms include coughing, headaches and rhinorrhea. Pertinent negatives include no abdominal pain, diarrhea, ear discharge, hearing loss, neck pain, rash, sore throat or vomiting. She has tried acetaminophen and ear drops for the symptoms. The treatment provided no relief.       Review of Systems   Review of Systems   Constitutional:  Negative for activity change, appetite change, chills, diaphoresis, fatigue, fever and unexpected weight change.   HENT:  Positive for congestion, ear pain, postnasal drip and rhinorrhea. Negative for dental problem, ear discharge, facial swelling, hearing loss, mouth sores, nosebleeds,  sinus pressure, sinus pain, sneezing, sore throat, tinnitus, trouble swallowing and voice change.    Eyes:  Negative for pain and visual disturbance.   Respiratory:  Positive for cough. Negative for apnea, choking, chest tightness, shortness of breath, wheezing and stridor.    Cardiovascular:  Negative for chest pain, palpitations and leg swelling.   Gastrointestinal:  Negative for abdominal pain, diarrhea and vomiting.   Genitourinary:  Negative for dysuria and hematuria.   Musculoskeletal:  Negative for arthralgias, back pain and neck pain.   Skin:  Negative for color change and rash.   Neurological:  Positive for headaches. Negative for dizziness, tremors, seizures, syncope, facial asymmetry, speech difficulty, weakness, light-headedness and numbness.   All other systems reviewed and are negative.        Current Medications       Current Outpatient Medications:     amoxicillin-clavulanate (AUGMENTIN) 875-125 mg per tablet, Take 1 tablet by mouth 2 (two) times a day for 10 days, Disp: 20 tablet, Rfl: 0    cholecalciferol (VITAMIN D3) 400 units tablet, Take 400 Units by mouth daily, Disp: , Rfl:     Cyanocobalamin (VITAMIN B12 PO), Take 1 tablet by mouth daily, Disp: , Rfl:     cycloSPORINE (RESTASIS) 0.05 % ophthalmic emulsion, Restasis 0.05 % eye drops in a dropperette, Disp: , Rfl:     cyproheptadine (PERIACTIN) 4 mg tablet, TAKE ONE TABLET BY MOUTH AT BEDTIME AS NEEDED for weather related headaches, Disp: 30 tablet, Rfl: 0    dicyclomine (BENTYL) 20 mg tablet, Take 1 tablet (20 mg total) by mouth 3 (three) times a day as needed (abdominal cramping), Disp: 30 tablet, Rfl: 0    estradiol (ESTRACE VAGINAL) 0.1 mg/g vaginal cream, Insert 1 g into the vagina 3 (three) times a week, Disp: 42.5 g, Rfl: 0    Galcanezumab-gnlm (Emgality) 120 MG/ML SOAJ, Inject 1 ml under the skin monthly, Disp: 1 mL, Rfl: 11    latanoprost (XALATAN) 0.005 % ophthalmic solution, 1 drop daily at bedtime, Disp: , Rfl:     magnesium oxide  (MAG-OX) 400 mg, Take 250 mg by mouth daily, Disp: , Rfl:     methenamine hippurate (HIPREX) 1 g tablet, TAKE ONE TABLET BY MOUTH TWICE DAILY WITH MEALS., Disp: 180 tablet, Rfl: 1    naproxen (Naprosyn) 500 mg tablet, Take 1 tablet (500 mg total) by mouth 2 (two) times a day with meals, Disp: 60 tablet, Rfl: 0    nortriptyline (PAMELOR) 25 mg capsule, Take 1 capsule (25 mg total) by mouth daily at bedtime, Disp: 90 capsule, Rfl: 2    Riboflavin (VITAMIN B-2 PO), Take 1 tablet by mouth daily, Disp: , Rfl:     Ubrogepant (UBRELVY) 100 MG tablet, Take 1 tablet (100 mg) one time as needed for migraine. May repeat one additional tablet (100 mg) at least two hours after the first dose. Do not use more than two doses per day, or for more than eight days per month., Disp: 8 tablet, Rfl: 11    Varenicline Tartrate (Tyrvaya) 0.03 MG/ACT SOLN, 1 spray into each nostril 2 (two) times a day, Disp: , Rfl:     venlafaxine (EFFEXOR-XR) 37.5 mg 24 hr capsule, Take 1 capsule (37.5 mg total) by mouth daily; Take in addition to 75 mg capsule (total of 112.5 mg daily), Disp: 90 capsule, Rfl: 1    venlafaxine (EFFEXOR-XR) 75 mg 24 hr capsule, Take 1 capsule (75 mg total) by mouth daily, Disp: 90 capsule, Rfl: 2    Galcanezumab-gnlm (Emgality) 120 MG/ML SOSY, Inject 2 ml under the skin once, Disp: 2 mL, Rfl: 0    neomycin-polymyxin-hydrocortisone (CORTISPORIN) 0.35%-10,000 units/mL-1% otic suspension, Administer 3 drops into the left ear 4 (four) times a day, Disp: 10 mL, Rfl: 0    predniSONE 10 mg tablet, Take PO 6-5-4-3-2-1, Disp: 21 tablet, Rfl: 0    Current Allergies     Allergies as of 10/28/2024 - Reviewed 10/28/2024   Allergen Reaction Noted    Sulfa antibiotics  09/14/2016            The following portions of the patient's history were reviewed and updated as appropriate: allergies, current medications, past family history, past medical history, past social history, past surgical history and problem list.     Past Medical History:    Diagnosis Date    Amenorrhea     Anemia     Cluster headache     Contact dermatitis     last assessed: 2013    Crohn's disease (HCC)     last assessed: 2012. Neg for Crohns based on last GI eval and path.     Degenerative disc disease, cervical     Folliculitis     last assessed: 2013    Headache, tension-type     Hyperlipidemia     IBS (irritable bowel syndrome)     Menorrhagia     Migraine     Vitamin D deficiency disease     last assessed: 2016       Past Surgical History:   Procedure Laterality Date     SECTION      DILATION AND CURETTAGE OF UTERUS      ENDOMETRIAL ABLATION      ESOPHAGOGASTRODUODENOSCOPY      LASER ABLATION OF VASCULAR LESION      KS COLONOSCOPY FLX DX W/COLLJ SPEC WHEN PFRMD N/A 9/15/2016    Procedure: COLONOSCOPY;  Surgeon: Gloria Madison DO;  Location: [facility];  Service: Gastroenterology       Family History   Problem Relation Age of Onset    Arthritis Mother     Ulcerative colitis Mother     Glaucoma Mother     Seizures Mother     Stroke Mother     Breast cancer Mother 77    Migraines Mother     Parkinsonism Mother     Cancer Mother         breast    Diabetes Mother     Hypertension Father     Lymphoma Father         non hodkins    Cancer Father         lymphoma    Raynaud syndrome Daughter     No Known Problems Daughter     No Known Problems Daughter     No Known Problems Maternal Grandmother     No Known Problems Maternal Grandfather     No Known Problems Paternal Grandmother     No Known Problems Paternal Grandfather     No Known Problems Maternal Aunt     No Known Problems Paternal Aunt          Medications have been verified.        Objective   /78   Pulse 94   Temp 98.7 °F (37.1 °C)   Resp 18   LMP  (LMP Unknown)   SpO2 99%   No LMP recorded (lmp unknown). Patient is postmenopausal.       Physical Exam     Physical Exam  Vitals and nursing note reviewed.   Constitutional:       General: She is not in acute distress.     Appearance:  Normal appearance. She is normal weight. She is not ill-appearing, toxic-appearing or diaphoretic.   HENT:      Head: Normocephalic and atraumatic.      Right Ear: Hearing and external ear normal. No decreased hearing noted. Tenderness present. No laceration, drainage or swelling. A middle ear effusion is present. There is no impacted cerumen. No foreign body. No mastoid tenderness. No PE tube. No hemotympanum. Tympanic membrane is injected, erythematous and bulging. Tympanic membrane is not scarred, perforated or retracted.      Left Ear: Hearing, tympanic membrane, ear canal and external ear normal.      Nose: Nose normal.      Mouth/Throat:      Mouth: Mucous membranes are moist.      Pharynx: No oropharyngeal exudate or posterior oropharyngeal erythema.   Eyes:      General: No scleral icterus.        Right eye: No discharge.         Left eye: No discharge.      Extraocular Movements: Extraocular movements intact.      Pupils: Pupils are equal, round, and reactive to light.   Cardiovascular:      Rate and Rhythm: Normal rate and regular rhythm.      Pulses: Normal pulses.           Carotid pulses are 2+ on the right side and 2+ on the left side.       Radial pulses are 2+ on the right side and 2+ on the left side.      Heart sounds: No murmur heard.     No friction rub. No gallop.   Pulmonary:      Effort: Pulmonary effort is normal. No respiratory distress.      Breath sounds: Normal breath sounds. No stridor. No wheezing, rhonchi or rales.   Chest:      Chest wall: No tenderness.   Abdominal:      General: Abdomen is flat. There is no distension.      Palpations: Abdomen is soft. There is no mass.      Tenderness: There is no abdominal tenderness. There is no right CVA tenderness, left CVA tenderness, guarding or rebound.      Hernia: No hernia is present.   Musculoskeletal:         General: No swelling, tenderness, deformity or signs of injury.      Cervical back: Normal range of motion and neck supple.       Right lower leg: No edema.      Left lower leg: No edema.   Skin:     General: Skin is warm and dry.      Capillary Refill: Capillary refill takes less than 2 seconds.      Coloration: Skin is not jaundiced or pale.      Findings: No bruising, erythema, lesion or rash.   Neurological:      General: No focal deficit present.      Mental Status: She is alert and oriented to person, place, and time.      Cranial Nerves: No cranial nerve deficit.      Sensory: No sensory deficit.      Motor: No weakness.      Coordination: Coordination normal.      Gait: Gait normal.   Psychiatric:         Mood and Affect: Mood normal.         Behavior: Behavior normal.                      Home

## 2024-11-04 ENCOUNTER — OFFICE VISIT (OUTPATIENT)
Dept: FAMILY MEDICINE CLINIC | Facility: CLINIC | Age: 59
End: 2024-11-04
Payer: COMMERCIAL

## 2024-11-04 VITALS
HEIGHT: 65 IN | TEMPERATURE: 98.6 F | SYSTOLIC BLOOD PRESSURE: 120 MMHG | OXYGEN SATURATION: 97 % | WEIGHT: 152.4 LBS | HEART RATE: 76 BPM | DIASTOLIC BLOOD PRESSURE: 82 MMHG | BODY MASS INDEX: 25.39 KG/M2

## 2024-11-04 DIAGNOSIS — H66.001 NON-RECURRENT ACUTE SUPPURATIVE OTITIS MEDIA OF RIGHT EAR WITHOUT SPONTANEOUS RUPTURE OF TYMPANIC MEMBRANE: Primary | ICD-10-CM

## 2024-11-04 PROCEDURE — 99213 OFFICE O/P EST LOW 20 MIN: CPT | Performed by: FAMILY MEDICINE

## 2024-11-04 RX ORDER — PREDNISONE 10 MG/1
TABLET ORAL
Qty: 21 TABLET | Refills: 0 | Status: SHIPPED | OUTPATIENT
Start: 2024-11-04 | End: 2024-11-13

## 2024-11-04 RX ORDER — CEFUROXIME AXETIL 500 MG/1
500 TABLET ORAL EVERY 12 HOURS SCHEDULED
Qty: 20 TABLET | Refills: 0 | Status: SHIPPED | OUTPATIENT
Start: 2024-11-04 | End: 2024-11-13

## 2024-11-04 NOTE — ASSESSMENT & PLAN NOTE
Wll change antibiotics and add prednisone   Orders:    cefuroxime (CEFTIN) 500 mg tablet; Take 1 tablet (500 mg total) by mouth every 12 (twelve) hours for 10 days    predniSONE 10 mg tablet; 6 tablets for 1 day then 5 tablets for 1 day then 4 tablets for 1 day then 3 tablets for 1 day then 2 tablets for 1 day then 1 tablet for 1 day

## 2024-11-04 NOTE — PROGRESS NOTES
Ambulatory Visit  Name: Tamela Carranza      : 1965      MRN: [id number]  Encounter Provider: Angella Rice DO  Encounter Date: 2024   Encounter department: [facility]    Assessment & Plan  Non-recurrent acute suppurative otitis media of right ear without spontaneous rupture of tympanic membrane  Wll change antibiotics and add prednisone   Orders:    cefuroxime (CEFTIN) 500 mg tablet; Take 1 tablet (500 mg total) by mouth every 12 (twelve) hours for 10 days    predniSONE 10 mg tablet; 6 tablets for 1 day then 5 tablets for 1 day then 4 tablets for 1 day then 3 tablets for 1 day then 2 tablets for 1 day then 1 tablet for 1 day       Chief Complaint   Patient presents with    Earache     Right ear pain. Pt was seen at urgent care one wk ago   Taking amoxil        History of Present Illness     Patient is having pain in the right ear that is severe She was seen urgent care on 10/28/2024 and started on augmentin for sinuses and right otitis media Patient is not getting better Her right ear is very painful she cannot hear out of it She is still having sinus pain and congestion also    Earache   There is pain in the right ear. This is a new problem. Episode onset: for last 10 days. The problem occurs constantly. The problem has been gradually worsening. There has been no fever. The pain is at a severity of 7/10. The pain is moderate. Associated symptoms include coughing, headaches, hearing loss and rhinorrhea. Pertinent negatives include no abdominal pain, diarrhea, ear discharge, neck pain, rash, sore throat or vomiting. She has tried antibiotics, NSAIDs and acetaminophen for the symptoms. The treatment provided no relief. There is no history of a chronic ear infection, hearing loss or a tympanostomy tube.         Review of Systems   HENT:  Positive for ear pain, hearing loss and rhinorrhea. Negative for ear discharge and sore throat.    Respiratory:  Positive for cough.   "  Gastrointestinal:  Negative for abdominal pain, diarrhea and vomiting.   Musculoskeletal:  Negative for neck pain.   Skin:  Negative for rash.   Neurological:  Positive for headaches.           Objective     /82   Pulse 76   Temp 98.6 °F (37 °C)   Ht 5' 5\" (1.651 m)   Wt 69.1 kg (152 lb 6.4 oz)   LMP  (LMP Unknown)   SpO2 97%   BMI 25.36 kg/m²     Physical Exam  Vitals and nursing note reviewed.   Constitutional:       Appearance: Normal appearance.   HENT:      Left Ear: Tympanic membrane and external ear normal.      Ears:      Comments: Right TM with redness effusion and bulging no perforation noted     Nose: Congestion present.   Eyes:      Extraocular Movements: Extraocular movements intact.      Conjunctiva/sclera: Conjunctivae normal.      Pupils: Pupils are equal, round, and reactive to light.   Cardiovascular:      Rate and Rhythm: Normal rate and regular rhythm.      Heart sounds: Normal heart sounds.   Pulmonary:      Effort: Pulmonary effort is normal.      Breath sounds: Normal breath sounds.   Neurological:      General: No focal deficit present.      Mental Status: She is alert and oriented to person, place, and time.   Psychiatric:         Mood and Affect: Mood normal.         Behavior: Behavior normal.         Thought Content: Thought content normal.         Judgment: Judgment normal.         "

## 2024-11-07 DIAGNOSIS — H66.001 NON-RECURRENT ACUTE SUPPURATIVE OTITIS MEDIA OF RIGHT EAR WITHOUT SPONTANEOUS RUPTURE OF TYMPANIC MEMBRANE: Primary | ICD-10-CM

## 2024-11-07 DIAGNOSIS — H60.522: Primary | ICD-10-CM

## 2024-11-12 ENCOUNTER — TELEPHONE (OUTPATIENT)
Dept: FAMILY MEDICINE CLINIC | Facility: CLINIC | Age: 59
End: 2024-11-12

## 2024-11-12 ENCOUNTER — TELEPHONE (OUTPATIENT)
Age: 59
End: 2024-11-12

## 2024-11-12 NOTE — TELEPHONE ENCOUNTER
Last visit 11/04/2024  Next appt 01/09/2025    Hector from [facility] stated that the patient is having surgery in 2 days and patient stated that she was not aware that she was supposed to do a preop appt with her PCP. Hector is asking if it is possible for doctor to fill out forms, that will be faxed today to your back fax line, without being scheduled. If this is not possible their office will conduct preop appt but will need some doctor's notes faxed back to their office. Please call Hector at [phone]. Their fax number is [phone]. Please advise.

## 2024-11-12 NOTE — TELEPHONE ENCOUNTER
[facility] faxed medical clearance forms to be signed for 11/14 surgery. Forms placed in Dr Cifuentes folder.

## 2024-11-25 DIAGNOSIS — N39.0 RECURRENT UTI: ICD-10-CM

## 2024-11-26 RX ORDER — METHENAMINE HIPPURATE 1000 MG/1
1 TABLET ORAL 2 TIMES DAILY WITH MEALS
Qty: 60 TABLET | Refills: 0 | Status: SHIPPED | OUTPATIENT
Start: 2024-11-26

## 2024-12-02 ENCOUNTER — OFFICE VISIT (OUTPATIENT)
Dept: NEUROLOGY | Facility: CLINIC | Age: 59
End: 2024-12-02
Payer: COMMERCIAL

## 2024-12-02 VITALS
WEIGHT: 153 LBS | OXYGEN SATURATION: 98 % | DIASTOLIC BLOOD PRESSURE: 80 MMHG | BODY MASS INDEX: 25.49 KG/M2 | HEART RATE: 83 BPM | HEIGHT: 65 IN | SYSTOLIC BLOOD PRESSURE: 132 MMHG | TEMPERATURE: 98.7 F

## 2024-12-02 DIAGNOSIS — G43.709 CHRONIC MIGRAINE WITHOUT AURA WITHOUT STATUS MIGRAINOSUS, NOT INTRACTABLE: ICD-10-CM

## 2024-12-02 DIAGNOSIS — G47.33 OSA (OBSTRUCTIVE SLEEP APNEA): Primary | ICD-10-CM

## 2024-12-02 PROCEDURE — 99214 OFFICE O/P EST MOD 30 MIN: CPT | Performed by: NURSE PRACTITIONER

## 2024-12-02 RX ORDER — VENLAFAXINE HYDROCHLORIDE 37.5 MG/1
37.5 CAPSULE, EXTENDED RELEASE ORAL DAILY
Qty: 90 CAPSULE | Refills: 2 | Status: SHIPPED | OUTPATIENT
Start: 2024-12-02

## 2024-12-02 RX ORDER — NORTRIPTYLINE HYDROCHLORIDE 25 MG/1
25 CAPSULE ORAL
Qty: 90 CAPSULE | Refills: 2 | Status: SHIPPED | OUTPATIENT
Start: 2024-12-02

## 2024-12-02 RX ORDER — VENLAFAXINE HYDROCHLORIDE 75 MG/1
75 CAPSULE, EXTENDED RELEASE ORAL DAILY
Qty: 90 CAPSULE | Refills: 2 | Status: SHIPPED | OUTPATIENT
Start: 2024-12-02

## 2024-12-02 RX ORDER — CYPROHEPTADINE HYDROCHLORIDE 4 MG/1
TABLET ORAL
Qty: 30 TABLET | Refills: 0 | Status: SHIPPED | OUTPATIENT
Start: 2024-12-02

## 2024-12-02 NOTE — ASSESSMENT & PLAN NOTE
Patient's migraines improved since starting emgaility, however in the recent months she has been under more stress and there has been continued changes in weather which are known triggers for her and has noted a slight increase in her headaches.  She does continue to find Ubrelvy effective, again reminded the patient to use cyproheptadine for weather related headaches or to use at bedtime if headache persists after taking Ubrelvy.  At this time she does not wish to make any adjustments and she remain on her current medications.      Plan:   Preventative:  Emgality  monthly   nortriptyline 25 mg 1 tab qhs   venlafaxine  112.5 (37.5 mg and 75 mg) mg qd  Vitamin b2  Magnesium oxide 400 mg daily        Abortive:  At onset of headache take urbelvy 100 mg may repeat dose in 2 hours if needed  May use toradol is headache persists   cyproheptadine prn for weather related headaches        Plan for follow up in 6 months. To contact the office sooner with any concerns or worsening symptoms.    Orders:    cyproheptadine (PERIACTIN) 4 mg tablet; TAKE ONE TABLET BY MOUTH AT BEDTIME AS NEEDED for weather related headaches    venlafaxine (EFFEXOR-XR) 75 mg 24 hr capsule; Take 1 capsule (75 mg total) by mouth daily    venlafaxine (EFFEXOR-XR) 37.5 mg 24 hr capsule; Take 1 capsule (37.5 mg total) by mouth daily Take in addition to 75 mg capsule (total of 112.5 mg daily)    nortriptyline (PAMELOR) 25 mg capsule; Take 1 capsule (25 mg total) by mouth daily at bedtime

## 2024-12-02 NOTE — PROGRESS NOTES
Name: Tamela Carranza      : 1965      MRN: [id number]  Encounter Provider: JEANNIE Lynn  Encounter Date: 2024   Encounter department: [facility] [place]    :  Assessment & Plan  Chronic migraine without aura without status migrainosus, not intractable  Patient's migraines improved since starting emgaility, however in the recent months she has been under more stress and there has been continued changes in weather which are known triggers for her and has noted a slight increase in her headaches.  She does continue to find Ubrelvy effective, again reminded the patient to use cyproheptadine for weather related headaches or to use at bedtime if headache persists after taking Ubrelvy.  At this time she does not wish to make any adjustments and she remain on her current medications.      Plan:   Preventative:  Emgality  monthly   nortriptyline 25 mg 1 tab qhs   venlafaxine  112.5 (37.5 mg and 75 mg) mg qd  Vitamin b2  Magnesium oxide 400 mg daily        Abortive:  At onset of headache take urbelvy 100 mg may repeat dose in 2 hours if needed  May use toradol is headache persists   cyproheptadine prn for weather related headaches        Plan for follow up in 6 months. To contact the office sooner with any concerns or worsening symptoms.    Orders:    cyproheptadine (PERIACTIN) 4 mg tablet; TAKE ONE TABLET BY MOUTH AT BEDTIME AS NEEDED for weather related headaches    venlafaxine (EFFEXOR-XR) 75 mg 24 hr capsule; Take 1 capsule (75 mg total) by mouth daily    venlafaxine (EFFEXOR-XR) 37.5 mg 24 hr capsule; Take 1 capsule (37.5 mg total) by mouth daily Take in addition to 75 mg capsule (total of 112.5 mg daily)    nortriptyline (PAMELOR) 25 mg capsule; Take 1 capsule (25 mg total) by mouth daily at bedtime    ANTONIETTA (obstructive sleep apnea)  Did have sleep study which showed mild sleep apnea, opted for positioning (avoiding laying flat on back) and has noted improvement in related  "symptoms.               History of Present Illness   HPI  Patient is a 58 y/o F who is here as a follow up for chronic migraines.     Last office 6/2024 in which she was started on emgality        Interval History:  She did see sleep medicine-was to repeat a sleep test which again showed a mild obstructive sleep apnea, she was to consider several options.     Has been on emgality for at least 4 months, she did not with the first few doses she noted the week before the injection would note an increased in headaches, did get better as the months went on.     The last month she has been under a good amount of stress which is known trigger and has been making her headaches worse.     Good months would get about 1-2 migraines per month-these would be the week before being due to the next dose, would improve with her abortive meds.     Over the last month she has had multiple headaches a week mild to moderate/severe.      Last time toradol did not help, has taken decadron once-not sure if effective     Prior hx:   Location: temples , parietal bilaterally     Duration: the rest of the day, has gone into the next day on rare occasion.      Timing: varies     Description: pounding, feels like head is in a \"vice\", Rates: severe-8/10   moderate 4-5/10 typically will treat at this point      Associated symptoms: photophobia, phonophobia, vomiting-rare, pins and needles on the temple rarely, neck tension/pain     She has had facial droop in the past with headaches, but has not had this in 10 years.      Aura: none     Triggers: Weather at times, stress     Lifestyle: 2-3 cups of coffee daily, 3-4 bottles of water daily, 4-6 hours nightly sometimes can have trouble sleeping, she has morning grogginess with melatonin, has snoring and daytime somnolence      Denies vision changes, dizziness, no worsening with coughing/sneezing/exercise/position change.     Current medications:  Preventative:   nortriptyline 25 mg 1 tab " qhs  venlafaxine 112 mg qd   Vitamin b2 200 mg   Magnesium  oxide 400 mg daily        Abortive:  Frovatriptan-can make her drowsy, so will only take it the evening  toradol  motrin   cyproheptadine prn for weather related headaches     prior medicaitons:  topamax, nortriptyline, magnesium, b2     maxalt  Rizatriptan 10 mg-was changed due to insurance    Review of Systems  Constitutional:  Negative for appetite change, fatigue and fever.   HENT: Negative.  Negative for hearing loss, tinnitus, trouble swallowing and voice change.    Eyes: Negative.  Negative for photophobia, pain and visual disturbance.   Respiratory: Negative.  Negative for shortness of breath.    Cardiovascular: Negative.  Negative for palpitations.   Gastrointestinal: Negative.  Negative for nausea and vomiting.   Endocrine: Negative.  Negative for cold intolerance.   Genitourinary: Negative.  Negative for dysuria, frequency and urgency.   Musculoskeletal:  Negative for back pain, gait problem, myalgias, neck pain and neck stiffness.   Skin: Negative.  Negative for rash.   Allergic/Immunologic: Negative.    Neurological:  Positive for headaches (migraines- 2-3 days before emgality injection/ stress headaches daily since 11/25/24). Negative for dizziness, tremors, seizures, syncope, facial asymmetry, speech difficulty, weakness, light-headedness and numbness.   Hematological: Negative.  Does not bruise/bleed easily.   Psychiatric/Behavioral: Negative.  Negative for confusion, hallucinations and sleep disturbance.    All other systems reviewed and are negative.  I have personally reviewed the MA's review of systems and made changes as necessary.         Objective   LMP  (LMP Unknown)     Physical Exam  Constitutional:       General: She is awake.   HENT:      Right Ear: Hearing normal.      Left Ear: Hearing normal.   Eyes:      General: Lids are normal.      Extraocular Movements: Extraocular movements intact.      Pupils: Pupils are equal, round,  and reactive to light.   Neurological:      Mental Status: She is alert.      Motor: Motor strength is normal.     Deep Tendon Reflexes:      Reflex Scores:       Bicep reflexes are 2+ on the right side and 2+ on the left side.       Brachioradialis reflexes are 2+ on the right side and 2+ on the left side.       Patellar reflexes are 2+ on the right side and 2+ on the left side.       Achilles reflexes are 2+ on the right side and 2+ on the left side.  Psychiatric:         Speech: Speech normal.       Neurologic Exam     Mental Status   Speech: speech is normal     Cranial Nerves     CN II   Visual fields full to confrontation.     CN III, IV, VI   Pupils are equal, round, and reactive to light.    CN XI   Right trapezius strength: normal  Left trapezius strength: normal    Motor Exam     Strength   Strength 5/5 throughout.     Sensory Exam   Light touch normal.     Gait, Coordination, and Reflexes     Reflexes   Right brachioradialis: 2+  Left brachioradialis: 2+  Right biceps: 2+  Left biceps: 2+  Right patellar: 2+  Left patellar: 2+  Right achilles: 2+  Left achilles: 2+    Neurological Exam  Mental Status  Awake and alert. Oriented to person, place, time and situation. Speech is normal. Language is fluent with no aphasia.    Cranial Nerves  CN II: Visual fields full to confrontation.  CN III, IV, VI: Extraocular movements intact bilaterally. Normal lids and orbits bilaterally. Pupils equal round and reactive to light bilaterally.  CN V:  Right: Facial sensation is normal.  Left: Facial sensation is normal on the left.  CN VII:  Right: There is no facial weakness.  Left: There is no facial weakness.  CN VIII:  Right: Hearing is normal.  Left: Hearing is normal.  CN IX, X: Palate elevates symmetrically  CN XI:  Right: Trapezius strength is normal.  Left: Trapezius strength is normal.  CN XII: Tongue midline without atrophy or fasciculations.    Motor   Strength is 5/5 throughout all four  extremities.    Sensory  Light touch is normal in upper and lower extremities. Temperature is normal in upper and lower extremities.     Reflexes                                            Right                      Left  Brachioradialis                    2+                         2+  Biceps                                 2+                         2+  Patellar                                2+                         2+  Achilles                                2+                         2+    Coordination  Right: Finger-to-nose normal.Left: Finger-to-nose normal.    Gait  Casual gait is normal including stance, stride, and arm swing. Able to rise from chair without using arms.

## 2024-12-02 NOTE — ASSESSMENT & PLAN NOTE
Did have sleep study which showed mild sleep apnea, opted for positioning (avoiding laying flat on back) and has noted improvement in related symptoms.

## 2024-12-02 NOTE — PROGRESS NOTES
Review of Systems   Constitutional:  Negative for appetite change, fatigue and fever.   HENT: Negative.  Negative for hearing loss, tinnitus, trouble swallowing and voice change.    Eyes: Negative.  Negative for photophobia, pain and visual disturbance.   Respiratory: Negative.  Negative for shortness of breath.    Cardiovascular: Negative.  Negative for palpitations.   Gastrointestinal: Negative.  Negative for nausea and vomiting.   Endocrine: Negative.  Negative for cold intolerance.   Genitourinary: Negative.  Negative for dysuria, frequency and urgency.   Musculoskeletal:  Negative for back pain, gait problem, myalgias, neck pain and neck stiffness.   Skin: Negative.  Negative for rash.   Allergic/Immunologic: Negative.    Neurological:  Positive for headaches (migraines- 2-3 days before emgality injection/ stress headaches daily since 11/25/24). Negative for dizziness, tremors, seizures, syncope, facial asymmetry, speech difficulty, weakness, light-headedness and numbness.   Hematological: Negative.  Does not bruise/bleed easily.   Psychiatric/Behavioral: Negative.  Negative for confusion, hallucinations and sleep disturbance.    All other systems reviewed and are negative.

## 2024-12-04 PROBLEM — H66.001 NON-RECURRENT ACUTE SUPPURATIVE OTITIS MEDIA OF RIGHT EAR WITHOUT SPONTANEOUS RUPTURE OF TYMPANIC MEMBRANE: Status: RESOLVED | Noted: 2024-11-04 | Resolved: 2024-12-04

## 2024-12-06 DIAGNOSIS — G43.709 CHRONIC MIGRAINE WITHOUT AURA WITHOUT STATUS MIGRAINOSUS, NOT INTRACTABLE: Primary | ICD-10-CM

## 2024-12-06 RX ORDER — DEXAMETHASONE 1 MG
TABLET ORAL
Qty: 7 TABLET | Refills: 0 | Status: SHIPPED | OUTPATIENT
Start: 2024-12-06

## 2024-12-09 ENCOUNTER — HOSPITAL ENCOUNTER (OUTPATIENT)
Dept: MAMMOGRAPHY | Facility: MEDICAL CENTER | Age: 59
Discharge: HOME | End: 2024-12-09
Payer: COMMERCIAL

## 2024-12-09 VITALS — BODY MASS INDEX: 25.49 KG/M2 | HEIGHT: 65 IN | WEIGHT: 153 LBS

## 2024-12-09 DIAGNOSIS — Z12.31 SCREENING MAMMOGRAM FOR BREAST CANCER: ICD-10-CM

## 2024-12-09 PROCEDURE — 77063 BREAST TOMOSYNTHESIS BI: CPT

## 2024-12-09 PROCEDURE — 77067 SCR MAMMO BI INCL CAD: CPT

## 2024-12-10 DIAGNOSIS — N39.0 RECURRENT UTI: ICD-10-CM

## 2024-12-11 RX ORDER — METHENAMINE HIPPURATE 1000 MG/1
1 TABLET ORAL 2 TIMES DAILY WITH MEALS
Qty: 60 TABLET | Refills: 0 | Status: SHIPPED | OUTPATIENT
Start: 2024-12-11

## 2024-12-22 DIAGNOSIS — K58.2 IRRITABLE BOWEL SYNDROME WITH BOTH CONSTIPATION AND DIARRHEA: ICD-10-CM

## 2024-12-24 RX ORDER — DICYCLOMINE HCL 20 MG
20 TABLET ORAL 3 TIMES DAILY PRN
Qty: 30 TABLET | Refills: 5 | Status: SHIPPED | OUTPATIENT
Start: 2024-12-24

## 2024-12-27 DIAGNOSIS — G43.709 CHRONIC MIGRAINE WITHOUT AURA WITHOUT STATUS MIGRAINOSUS, NOT INTRACTABLE: ICD-10-CM

## 2024-12-27 RX ORDER — CYPROHEPTADINE HYDROCHLORIDE 4 MG/1
TABLET ORAL
Qty: 30 TABLET | Refills: 2 | Status: SHIPPED | OUTPATIENT
Start: 2024-12-27

## 2025-01-09 ENCOUNTER — OFFICE VISIT (OUTPATIENT)
Dept: FAMILY MEDICINE CLINIC | Facility: CLINIC | Age: 60
End: 2025-01-09
Payer: COMMERCIAL

## 2025-01-09 ENCOUNTER — RESULTS FOLLOW-UP (OUTPATIENT)
Dept: LABOR AND DELIVERY | Facility: HOSPITAL | Age: 60
End: 2025-01-09

## 2025-01-09 VITALS
TEMPERATURE: 97.9 F | HEIGHT: 65 IN | OXYGEN SATURATION: 99 % | WEIGHT: 150 LBS | BODY MASS INDEX: 24.99 KG/M2 | HEART RATE: 95 BPM | SYSTOLIC BLOOD PRESSURE: 118 MMHG | DIASTOLIC BLOOD PRESSURE: 80 MMHG

## 2025-01-09 DIAGNOSIS — G43.709 CHRONIC MIGRAINE WITHOUT AURA WITHOUT STATUS MIGRAINOSUS, NOT INTRACTABLE: ICD-10-CM

## 2025-01-09 DIAGNOSIS — F41.9 ANXIETY: ICD-10-CM

## 2025-01-09 DIAGNOSIS — N95.1 PERIMENOPAUSAL: ICD-10-CM

## 2025-01-09 DIAGNOSIS — R45.4 IRRITABILITY: ICD-10-CM

## 2025-01-09 DIAGNOSIS — Z00.00 HEALTH MAINTENANCE EXAMINATION: Primary | ICD-10-CM

## 2025-01-09 DIAGNOSIS — R76.8 POSITIVE ANA (ANTINUCLEAR ANTIBODY): ICD-10-CM

## 2025-01-09 DIAGNOSIS — G47.33 OSA (OBSTRUCTIVE SLEEP APNEA): ICD-10-CM

## 2025-01-09 PROCEDURE — 99214 OFFICE O/P EST MOD 30 MIN: CPT | Performed by: NURSE PRACTITIONER

## 2025-01-09 PROCEDURE — 99396 PREV VISIT EST AGE 40-64: CPT | Performed by: NURSE PRACTITIONER

## 2025-01-09 RX ORDER — BUSPIRONE HYDROCHLORIDE 5 MG/1
TABLET ORAL
Qty: 60 TABLET | Refills: 0 | Status: SHIPPED | OUTPATIENT
Start: 2025-01-09 | End: 2025-02-08

## 2025-01-09 NOTE — PROGRESS NOTES
Adult Annual Physical  Name: Tamela Carranza      : 1965      MRN: [id number]  Encounter Provider: JEANNIE Arias  Encounter Date: 2025   Encounter department: [facility]    Assessment & Plan  Health maintenance examination    Orders:  •  TSH, 3rd generation with Free T4 reflex; Future  •  Lipid panel; Future  •  Comprehensive metabolic panel; Future  •  CBC and differential; Future    ANTONIETTA (obstructive sleep apnea)  Saw sleep medicine:   Did have sleep study which showed mild sleep apnea, opted for positioning (avoiding laying flat on back) and has noted improvement in related symptoms.           Chronic migraine without aura without status migrainosus, not intractable  Continue to follow with neurology for management.        Perimenopausal  Follow up with Dr. Braun        Anxiety  Start medication for anxiety, this is Buspar. Start one pill daily for 3 days, if well tolerated then increase to one pill twice a day routinely for a few weeks. You can continue with just one pill daily if needed in cases where your body is taking longer to adjust to it. We will then follow up to see how you are doing. Medication can then be increased if needed at that time. Follow up in 3-4 weeks after starting medication.   Recheck 1 month  Orders:  •  busPIRone (BUSPAR) 5 mg tablet; Take 1 tablet (5 mg total) by mouth daily for 3 days, THEN 1 tablet (5 mg total) 2 (two) times a day for 27 days.    Irritability  See above   Orders:  •  busPIRone (BUSPAR) 5 mg tablet; Take 1 tablet (5 mg total) by mouth daily for 3 days, THEN 1 tablet (5 mg total) 2 (two) times a day for 27 days.    Positive SANDEEP (antinuclear antibody)  Repeat- if still positive will refer to rheumatology.   Orders:  •  SANDEEP Screen w/Reflex Jackson; Future    Immunizations and preventive care screenings were discussed with patient today. Appropriate education was printed on patient's after visit  "summary.    Counseling:  Dental Health: discussed importance of regular tooth brushing, flossing, and dental visits.  Exercise: the importance of regular exercise/physical activity was discussed. Recommend exercise 3-5 times per week for at least 30 minutes.       Depression Screening and Follow-up Plan: Patient was screened for depression during today's encounter. They screened negative with a PHQ-2 score of 0.        History of Present Illness     Adult Annual Physical:  Patient presents for annual physical. Here for well visit.   Wants to discuss menopause.   Hot flashes/ weight gain/ change in mood. She was sent to menopause specialist and tried progesterone. Patience is worse. Not as manageable.   Has been trying supplements to help with weight. .     Diet and Physical Activity:  - Diet/Nutrition: well balanced diet.  - Exercise:. active/walking/ going to start pilates    Depression Screening:  - PHQ-2 Score: 0    General Health:  - Sleep: sleeps well.  - Vision: goes for regular eye exams.  - Dental: regular dental visits.    /GYN Health:  - Follows with GYN: yes.   - Menopause: postmenopausal.     Review of Systems   Constitutional:  Negative for chills and fever.   Eyes:  Negative for discharge.   Respiratory:  Negative for shortness of breath.    Cardiovascular:  Negative for chest pain.   Gastrointestinal:  Negative for constipation and diarrhea.   Genitourinary:  Negative for difficulty urinating.   Musculoskeletal:  Negative for joint swelling.   Skin:  Negative for rash.   Neurological:  Positive for headaches.   Hematological:  Negative for adenopathy.   Psychiatric/Behavioral:  Positive for agitation. The patient is nervous/anxious.          Objective   /80   Pulse 95   Temp 97.9 °F (36.6 °C) (Temporal)   Ht 5' 5\" (1.651 m)   Wt 68 kg (150 lb)   LMP  (LMP Unknown)   SpO2 99%   BMI 24.96 kg/m²     Physical Exam  Vitals and nursing note reviewed.   Constitutional:       General: She is " not in acute distress.     Appearance: Normal appearance. She is not ill-appearing, toxic-appearing or diaphoretic.   HENT:      Head: Normocephalic and atraumatic.      Right Ear: External ear normal.      Left Ear: External ear normal.      Nose: Nose normal.      Mouth/Throat:      Mouth: Mucous membranes are moist.      Pharynx: Oropharynx is clear. No oropharyngeal exudate or posterior oropharyngeal erythema.   Eyes:      General: Lids are normal. No scleral icterus.        Right eye: No discharge.         Left eye: No discharge.      Extraocular Movements: Extraocular movements intact.      Conjunctiva/sclera: Conjunctivae normal.      Pupils: Pupils are equal, round, and reactive to light.   Cardiovascular:      Rate and Rhythm: Normal rate and regular rhythm. No extrasystoles are present.     Heart sounds: S1 normal and S2 normal. No murmur heard.  Pulmonary:      Effort: Pulmonary effort is normal. No respiratory distress.      Breath sounds: Normal breath sounds. No stridor or decreased air movement. No wheezing or rhonchi.   Abdominal:      General: Bowel sounds are normal.      Palpations: Abdomen is soft.      Tenderness: There is no abdominal tenderness.   Musculoskeletal:         General: Normal range of motion.      Cervical back: Normal range of motion and neck supple.   Skin:     General: Skin is warm and dry.   Neurological:      General: No focal deficit present.      Mental Status: She is alert and oriented to person, place, and time. Mental status is at baseline.      Cranial Nerves: No cranial nerve deficit.      Sensory: No sensory deficit.      Motor: No weakness.      Coordination: Coordination normal.      Gait: Gait normal.   Psychiatric:         Attention and Perception: Attention and perception normal.         Mood and Affect: Mood and affect normal.         Speech: Speech normal.         Behavior: Behavior is cooperative.         Cognition and Memory: Cognition normal.          Judgment: Judgment normal.

## 2025-01-09 NOTE — PATIENT INSTRUCTIONS
Complete labs, these are fasting.     Follow up with Dr. Braun     Could consider meeting with dietician.     Continue to follow with neuro.     Please call the office if you are experiencing any worsening of symptoms or no symptom improvement.     Follow up 1 month.     Start medication for anxiety, this is Buspar. Start one pill daily for 3 days, if well tolerated then increase to one pill twice a day routinely for a few weeks. You can continue with just one pill daily if needed in cases where your body is taking longer to adjust to it. We will then follow up to see how you are doing. Medication can then be increased if needed at that time. Follow up in 3-4 weeks after starting medication.     Please call the office if you are experiencing any worsening of symptoms or no symptom improvement.     Buspirone (By mouth)   Buspirone ([id number])  Treats anxiety.   Brand Name(s):   There may be other brand names for this medicine.  When This Medicine Should Not Be Used:   You should not use this medicine if you have had an allergic reaction to buspirone.   How to Use This Medicine:   Tablet  Your doctor will tell you how much of this medicine to take and how often. Do not take more medicine or take it more often than your doctor tells you to.  You may take this medicine with or without food, but take it the same way each time.  You may need to take this medicine for 1 or 2 weeks before you begin to feel better.  If a dose is missed:   If you miss a dose or forget to take your medicine, take it as soon as you can. If it is almost time for your next dose, wait until then to take the medicine and skip the missed dose.  Do not use extra medicine to make up for a missed dose.  How to Store and Dispose of This Medicine:   Store the medicine at room temperature, away from heat, moisture, and direct light.  Keep all medicine out of the reach of children and never share your medicine with anyone.  Drugs and Foods to  Avoid:   Ask your doctor or pharmacist before using any other medicine, including over-the-counter medicines, vitamins, and herbal products.  You should not use buspirone when you are also using an MAO inhibitor (such as Eldepryl®, Marplan®, Nardil®, Parnate®).  Do not eat grapefruit, drink grapefruit juice, or drink alcohol while you are using buspirone.  Make sure your doctor knows if you are also using cimetidine (Tagamet®), dexamethasone (Decadron®), diltiazem (Cardizem®, Tiazac®), erythromycin (Erythro-Tab®), itraconazole (Sporanox®), nefazodone (Serzone®), rifampin (Rifadin®, Rifamate®, Rifater®), verapamil (Calan®, Covera®), or medicine for seizures (such as Dilantin®, Luminal®, Tegretol®).  Make sure your doctor knows if you are using any medicines that make you sleepy (such as sleeping pills, cold and allergy medicine, narcotic pain relievers, or sedatives).  Warnings While Using This Medicine:   Make sure your doctor knows if you are pregnant or breastfeeding, or if you have kidney or liver disease.  Do not stop using this medicine suddenly without asking your doctor. You may need to slowly decrease your dose before stopping it completely.  This medicine may make you dizzy or drowsy. Avoid driving, using machines, or doing anything else that could be dangerous if you are not alert.  Possible Side Effects While Using This Medicine:   Call your doctor right away if you notice any of these side effects:  Fast or pounding heartbeat  Numbness or tingling feeling  Tremors or shaking  If you notice these less serious side effects, talk with your doctor:   Drowsiness or weakness  Dry mouth  Feeling restless or nervous, trouble sleeping  Headache  Nausea, constipation, upset stomach  If you notice other side effects that you think are caused by this medicine, tell your doctor.   Call your doctor for medical advice about side effects. You may report side effects to FDA at 6-258-IQU-6456  © 2017 [employer] Information is for End User's use only and may not be sold, redistributed or otherwise used for commercial purposes.  The above information is an  only. It is not intended as medical advice for individual conditions or treatments. Talk to your doctor, nurse or pharmacist before following any medical regimen to see if it is safe and effective for you.

## 2025-01-09 NOTE — ASSESSMENT & PLAN NOTE
Saw sleep medicine:   Did have sleep study which showed mild sleep apnea, opted for positioning (avoiding laying flat on back) and has noted improvement in related symptoms.

## 2025-01-11 ENCOUNTER — APPOINTMENT (OUTPATIENT)
Dept: LAB | Age: 60
End: 2025-01-11
Payer: COMMERCIAL

## 2025-01-11 DIAGNOSIS — Z00.00 HEALTH MAINTENANCE EXAMINATION: ICD-10-CM

## 2025-01-11 DIAGNOSIS — R76.8 POSITIVE ANA (ANTINUCLEAR ANTIBODY): ICD-10-CM

## 2025-01-11 LAB
ALBUMIN SERPL BCG-MCNC: 4.5 G/DL (ref 3.5–5)
ALP SERPL-CCNC: 41 U/L (ref 34–104)
ALT SERPL W P-5'-P-CCNC: 22 U/L (ref 7–52)
ANION GAP SERPL CALCULATED.3IONS-SCNC: 8 MMOL/L (ref 4–13)
AST SERPL W P-5'-P-CCNC: 17 U/L (ref 13–39)
BASOPHILS # BLD AUTO: 0 THOUSANDS/ΜL (ref 0–0.1)
BASOPHILS NFR BLD AUTO: 0 % (ref 0–1)
BILIRUB SERPL-MCNC: 0.38 MG/DL (ref 0.2–1)
BUN SERPL-MCNC: 28 MG/DL (ref 5–25)
CALCIUM SERPL-MCNC: 8.9 MG/DL (ref 8.4–10.2)
CHLORIDE SERPL-SCNC: 102 MMOL/L (ref 96–108)
CHOLEST SERPL-MCNC: 301 MG/DL (ref ?–200)
CO2 SERPL-SCNC: 29 MMOL/L (ref 21–32)
CREAT SERPL-MCNC: 0.68 MG/DL (ref 0.6–1.3)
EOSINOPHIL # BLD AUTO: 0 THOUSAND/ΜL (ref 0–0.61)
EOSINOPHIL NFR BLD AUTO: 0 % (ref 0–6)
ERYTHROCYTE [DISTWIDTH] IN BLOOD BY AUTOMATED COUNT: 13.3 % (ref 11.6–15.1)
GFR SERPL CREATININE-BSD FRML MDRD: 96 ML/MIN/1.73SQ M
GLUCOSE P FAST SERPL-MCNC: 89 MG/DL (ref 65–99)
HCT VFR BLD AUTO: 41.6 % (ref 34.8–46.1)
HDLC SERPL-MCNC: 70 MG/DL
HGB BLD-MCNC: 12.9 G/DL (ref 11.5–15.4)
IMM GRANULOCYTES # BLD AUTO: 0.01 THOUSAND/UL (ref 0–0.2)
IMM GRANULOCYTES NFR BLD AUTO: 0 % (ref 0–2)
LDLC SERPL CALC-MCNC: 214 MG/DL (ref 0–100)
LYMPHOCYTES # BLD AUTO: 2.28 THOUSANDS/ΜL (ref 0.6–4.47)
LYMPHOCYTES NFR BLD AUTO: 43 % (ref 14–44)
MCH RBC QN AUTO: 28.7 PG (ref 26.8–34.3)
MCHC RBC AUTO-ENTMCNC: 31 G/DL (ref 31.4–37.4)
MCV RBC AUTO: 92 FL (ref 82–98)
MONOCYTES # BLD AUTO: 0.51 THOUSAND/ΜL (ref 0.17–1.22)
MONOCYTES NFR BLD AUTO: 10 % (ref 4–12)
NEUTROPHILS # BLD AUTO: 2.5 THOUSANDS/ΜL (ref 1.85–7.62)
NEUTS SEG NFR BLD AUTO: 47 % (ref 43–75)
NONHDLC SERPL-MCNC: 231 MG/DL
NRBC BLD AUTO-RTO: 0 /100 WBCS
PLATELET # BLD AUTO: 328 THOUSANDS/UL (ref 149–390)
PMV BLD AUTO: 9.8 FL (ref 8.9–12.7)
POTASSIUM SERPL-SCNC: 4 MMOL/L (ref 3.5–5.3)
PROT SERPL-MCNC: 6.8 G/DL (ref 6.4–8.4)
RBC # BLD AUTO: 4.5 MILLION/UL (ref 3.81–5.12)
SODIUM SERPL-SCNC: 139 MMOL/L (ref 135–147)
TRIGL SERPL-MCNC: 86 MG/DL (ref ?–150)
TSH SERPL DL<=0.05 MIU/L-ACNC: 1.38 UIU/ML (ref 0.45–4.5)
WBC # BLD AUTO: 5.3 THOUSAND/UL (ref 4.31–10.16)

## 2025-01-11 PROCEDURE — 85025 COMPLETE CBC W/AUTO DIFF WBC: CPT

## 2025-01-11 PROCEDURE — 80061 LIPID PANEL: CPT

## 2025-01-11 PROCEDURE — 86225 DNA ANTIBODY NATIVE: CPT

## 2025-01-11 PROCEDURE — 84443 ASSAY THYROID STIM HORMONE: CPT

## 2025-01-11 PROCEDURE — 36415 COLL VENOUS BLD VENIPUNCTURE: CPT

## 2025-01-11 PROCEDURE — 86038 ANTINUCLEAR ANTIBODIES: CPT

## 2025-01-11 PROCEDURE — 80053 COMPREHEN METABOLIC PANEL: CPT

## 2025-01-13 ENCOUNTER — TELEPHONE (OUTPATIENT)
Age: 60
End: 2025-01-13

## 2025-01-13 LAB
DSDNA IGG SERPL IA-ACNC: 1.7 IU/ML (ref ?–15)
NUCLEAR IGG SER IA-RTO: 0.1 RATIO (ref ?–1)

## 2025-01-13 NOTE — TELEPHONE ENCOUNTER
Called patient to review Mammo results and message from Dr. Wadsworth. No answer. LMOM for patient to call the office to discuss results and if she would like to follow routine screening. If desires we can place order per Dr. Wadsworth.

## 2025-01-15 ENCOUNTER — PATIENT MESSAGE (OUTPATIENT)
Dept: FAMILY MEDICINE CLINIC | Facility: CLINIC | Age: 60
End: 2025-01-15

## 2025-01-15 DIAGNOSIS — E78.2 MIXED HYPERLIPIDEMIA: Primary | ICD-10-CM

## 2025-01-17 ENCOUNTER — RESULTS FOLLOW-UP (OUTPATIENT)
Dept: FAMILY MEDICINE CLINIC | Facility: CLINIC | Age: 60
End: 2025-01-17

## 2025-01-20 DIAGNOSIS — N39.0 RECURRENT UTI: ICD-10-CM

## 2025-01-21 RX ORDER — METHENAMINE HIPPURATE 1000 MG/1
1 TABLET ORAL 2 TIMES DAILY WITH MEALS
Qty: 60 TABLET | Refills: 0 | Status: SHIPPED | OUTPATIENT
Start: 2025-01-21

## 2025-02-03 DIAGNOSIS — R45.4 IRRITABILITY: ICD-10-CM

## 2025-02-03 DIAGNOSIS — F41.9 ANXIETY: ICD-10-CM

## 2025-02-04 RX ORDER — BUSPIRONE HYDROCHLORIDE 5 MG/1
TABLET ORAL
Qty: 60 TABLET | Refills: 0 | OUTPATIENT
Start: 2025-02-04 | End: 2025-03-05

## 2025-02-10 DIAGNOSIS — F41.9 ANXIETY: ICD-10-CM

## 2025-02-10 DIAGNOSIS — R45.4 IRRITABILITY: ICD-10-CM

## 2025-02-10 NOTE — TELEPHONE ENCOUNTER
Patient called refill line to report she does need busPIRone (BUSPAR) 5 mg refilled  1 twice daily  Send to [facility], she does have enough for 3 days

## 2025-02-11 ENCOUNTER — RA CDI HCC (OUTPATIENT)
Dept: OTHER | Facility: HOSPITAL | Age: 60
End: 2025-02-11

## 2025-02-11 RX ORDER — BUSPIRONE HYDROCHLORIDE 5 MG/1
5 TABLET ORAL 2 TIMES DAILY
Qty: 180 TABLET | Refills: 0 | Status: SHIPPED | OUTPATIENT
Start: 2025-02-11

## 2025-02-13 ENCOUNTER — OFFICE VISIT (OUTPATIENT)
Dept: FAMILY MEDICINE CLINIC | Facility: CLINIC | Age: 60
End: 2025-02-13
Payer: COMMERCIAL

## 2025-02-13 VITALS
DIASTOLIC BLOOD PRESSURE: 80 MMHG | HEIGHT: 65 IN | OXYGEN SATURATION: 97 % | WEIGHT: 158 LBS | TEMPERATURE: 97.5 F | HEART RATE: 73 BPM | SYSTOLIC BLOOD PRESSURE: 120 MMHG | BODY MASS INDEX: 26.33 KG/M2

## 2025-02-13 DIAGNOSIS — F41.9 ANXIETY: Primary | ICD-10-CM

## 2025-02-13 DIAGNOSIS — R45.4 IRRITABILITY: ICD-10-CM

## 2025-02-13 PROCEDURE — 99214 OFFICE O/P EST MOD 30 MIN: CPT | Performed by: NURSE PRACTITIONER

## 2025-02-13 NOTE — PATIENT INSTRUCTIONS
Can increase Buspar to 10 mg in AM for 3 days and then 10 mg twice a day.     Give me update in 3-4 weeks.     Consider massage therapy.     Please call the office if you are experiencing any worsening of symptoms or no symptom improvement.

## 2025-02-13 NOTE — PROGRESS NOTES
"Name: Tamela Carranaz      : 1965      MRN: [id number]  Encounter Provider: JEANNIE Arias  Encounter Date: 2025   Encounter department: [facility]  :  Assessment & Plan  Anxiety  Can increase Buspar to 10 mg in AM for 3 days and then 10 mg twice a day.     Give me update in 3-4 weeks. Can continue to titrate as needed.    Consider massage therapy.     Please call the office if you are experiencing any worsening of symptoms or no symptom improvement.        Irritability  Can increase Buspar to 10 mg in AM for 3 days and then 10 mg twice a day.     Give me update in 3-4 weeks.     Consider massage therapy.     Please call the office if you are experiencing any worsening of symptoms or no symptom improvement.               History of Present Illness   Here to discuss follow up regarding anxiety. Was started on Buspar 1 month ago.   Has noticed improvement but not much. Irritably has improved. On Buspar 5 mg BID. No side effects. Still has fatigue.   Migraines not totally controlled-follows with neurology. Does not feel it's related to Buspar. Tolerating Buspar well without any side effects.      Review of Systems   Constitutional:  Positive for fatigue. Negative for chills and fever.   Eyes:  Negative for discharge.   Respiratory:  Negative for shortness of breath.    Cardiovascular:  Negative for chest pain.   Gastrointestinal:  Negative for constipation and diarrhea.   Genitourinary:  Negative for difficulty urinating.   Musculoskeletal:  Negative for joint swelling.   Skin:  Negative for rash.   Neurological:  Positive for headaches.   Hematological:  Negative for adenopathy.   Psychiatric/Behavioral:  The patient is not nervous/anxious.        Objective   /80   Pulse 73   Temp 97.5 °F (36.4 °C)   Ht 5' 5\" (1.651 m)   Wt 71.7 kg (158 lb)   LMP  (LMP Unknown)   SpO2 97%   BMI 26.29 kg/m²      Physical Exam  Vitals and nursing note reviewed. "   Constitutional:       General: She is not in acute distress.     Appearance: Normal appearance. She is well-developed. She is not diaphoretic.   HENT:      Head: Normocephalic and atraumatic.      Right Ear: External ear normal.      Left Ear: External ear normal.   Eyes:      General: Lids are normal.         Right eye: No discharge.         Left eye: No discharge.      Conjunctiva/sclera: Conjunctivae normal.   Pulmonary:      Effort: Pulmonary effort is normal. No respiratory distress.   Musculoskeletal:         General: No deformity.   Skin:     General: Skin is warm and dry.   Neurological:      General: No focal deficit present.      Mental Status: She is alert and oriented to person, place, and time.   Psychiatric:         Speech: Speech normal.         Behavior: Behavior normal.         Thought Content: Thought content normal.         Judgment: Judgment normal.

## 2025-02-16 DIAGNOSIS — N39.0 RECURRENT UTI: ICD-10-CM

## 2025-02-17 RX ORDER — METHENAMINE HIPPURATE 1000 MG/1
1 TABLET ORAL 2 TIMES DAILY WITH MEALS
Qty: 60 TABLET | Refills: 0 | Status: SHIPPED | OUTPATIENT
Start: 2025-02-17

## 2025-02-23 DIAGNOSIS — G43.709 CHRONIC MIGRAINE WITHOUT AURA WITHOUT STATUS MIGRAINOSUS, NOT INTRACTABLE: ICD-10-CM

## 2025-02-27 ENCOUNTER — PATIENT MESSAGE (OUTPATIENT)
Dept: FAMILY MEDICINE CLINIC | Facility: CLINIC | Age: 60
End: 2025-02-27

## 2025-02-27 DIAGNOSIS — R53.83 OTHER FATIGUE: Primary | ICD-10-CM

## 2025-03-01 ENCOUNTER — APPOINTMENT (OUTPATIENT)
Dept: LAB | Age: 60
End: 2025-03-01
Payer: COMMERCIAL

## 2025-03-01 DIAGNOSIS — R53.83 OTHER FATIGUE: ICD-10-CM

## 2025-03-01 LAB
25(OH)D3 SERPL-MCNC: 41 NG/ML (ref 30–100)
BASOPHILS # BLD AUTO: 0 THOUSANDS/ÂΜL (ref 0–0.1)
BASOPHILS NFR BLD AUTO: 0 % (ref 0–1)
EOSINOPHIL # BLD AUTO: 0 THOUSAND/ÂΜL (ref 0–0.61)
EOSINOPHIL NFR BLD AUTO: 0 % (ref 0–6)
ERYTHROCYTE [DISTWIDTH] IN BLOOD BY AUTOMATED COUNT: 13.1 % (ref 11.6–15.1)
FERRITIN SERPL-MCNC: 13 NG/ML (ref 11–307)
HCT VFR BLD AUTO: 41.4 % (ref 34.8–46.1)
HGB BLD-MCNC: 12.9 G/DL (ref 11.5–15.4)
IMM GRANULOCYTES # BLD AUTO: 0.01 THOUSAND/UL (ref 0–0.2)
IMM GRANULOCYTES NFR BLD AUTO: 0 % (ref 0–2)
IRON SATN MFR SERPL: 16 % (ref 15–50)
IRON SERPL-MCNC: 66 UG/DL (ref 50–212)
LYMPHOCYTES # BLD AUTO: 2 THOUSANDS/ÂΜL (ref 0.6–4.47)
LYMPHOCYTES NFR BLD AUTO: 39 % (ref 14–44)
MCH RBC QN AUTO: 28.4 PG (ref 26.8–34.3)
MCHC RBC AUTO-ENTMCNC: 31.2 G/DL (ref 31.4–37.4)
MCV RBC AUTO: 91 FL (ref 82–98)
MONOCYTES # BLD AUTO: 0.49 THOUSAND/ÂΜL (ref 0.17–1.22)
MONOCYTES NFR BLD AUTO: 9 % (ref 4–12)
NEUTROPHILS # BLD AUTO: 2.69 THOUSANDS/ÂΜL (ref 1.85–7.62)
NEUTS SEG NFR BLD AUTO: 52 % (ref 43–75)
NRBC BLD AUTO-RTO: 0 /100 WBCS
PLATELET # BLD AUTO: 320 THOUSANDS/UL (ref 149–390)
PMV BLD AUTO: 9.8 FL (ref 8.9–12.7)
RBC # BLD AUTO: 4.54 MILLION/UL (ref 3.81–5.12)
TIBC SERPL-MCNC: 411.6 UG/DL (ref 250–450)
TRANSFERRIN SERPL-MCNC: 294 MG/DL (ref 203–362)
UIBC SERPL-MCNC: 346 UG/DL (ref 155–355)
VIT B12 SERPL-MCNC: 311 PG/ML (ref 180–914)
WBC # BLD AUTO: 5.19 THOUSAND/UL (ref 4.31–10.16)

## 2025-03-01 PROCEDURE — 82607 VITAMIN B-12: CPT

## 2025-03-01 PROCEDURE — 83550 IRON BINDING TEST: CPT

## 2025-03-01 PROCEDURE — 82728 ASSAY OF FERRITIN: CPT

## 2025-03-01 PROCEDURE — 83540 ASSAY OF IRON: CPT

## 2025-03-01 PROCEDURE — 36415 COLL VENOUS BLD VENIPUNCTURE: CPT

## 2025-03-01 PROCEDURE — 82306 VITAMIN D 25 HYDROXY: CPT

## 2025-03-01 PROCEDURE — 85025 COMPLETE CBC W/AUTO DIFF WBC: CPT

## 2025-03-03 ENCOUNTER — RESULTS FOLLOW-UP (OUTPATIENT)
Dept: FAMILY MEDICINE CLINIC | Facility: CLINIC | Age: 60
End: 2025-03-03

## 2025-03-07 ENCOUNTER — NURSE TRIAGE (OUTPATIENT)
Dept: NEUROLOGY | Facility: CLINIC | Age: 60
End: 2025-03-07

## 2025-03-07 DIAGNOSIS — G43.709 CHRONIC MIGRAINE WITHOUT AURA WITHOUT STATUS MIGRAINOSUS, NOT INTRACTABLE: ICD-10-CM

## 2025-03-13 NOTE — PATIENT COMMUNICATION
Tamela Carranza to [facility] (supporting JEANNIE Lynn)     3/7/25 12:06 PM  Hi,  I wanted to reach out to you because I have been having almost daily headaches and they are pretty strong. I was having headaches the week before I took my shot which is not uncommon. I took my shot on February 25 and usually it takes a few days to Kick in and then I’m good but shot hasn’t kicked in. I am having daily headaches, and I’ve taken the Ubrlvey more frequently than I’ve ever have.  I also recently started on buspirone but I was told that that would not affect my migraines or make them worse        Thanks,  Tamela Carranza     -----------------------------------------  Outbound call made to Patient and a voicemail was left requesting to call in and update us. [employer] message also sent.

## 2025-03-13 NOTE — TELEPHONE ENCOUNTER
FOLLOW UP: Call patient with recommendations    REASON FOR CONVERSATION: Headache    SYMPTOMS: pressure on the top of head    OTHER: Frequently takes Ubrelvy    DISPOSITION: Home Care    Reason for Disposition   Similar to previously diagnosed migraine headaches    Answer Assessment - Initial Assessment Questions  When did migraine start? Patient's migraine comes back about a week before she takes Emgality on the 25 th of every month. Two days after the injection, patient's migraine resolves. This time it did not.     Location/Description: On the top of patient's head, patient has a lot of pressure like her head is in vice.    Pain scale: 7    Associated symptoms:sonophobia, photophobia    Precipitating factors: Patient recently increased Buspar to 10 mg BID    Alleviating factors: None    What medications have you tried for this migraine headache?    mg Q 4 hr PRD  Ubrelvy 100 mg     Advised we do not recommend taking any Triptans or OTC medications more than 3 days in any week due to medication overuse headache and CVA risk with overuse. Patient voiced clear understanding    Current migraine medications are confirmed as:  Ubrelvy 100 mg  Emgality 120 mg    Medications tried in the past?   Decadron: Yes helped  Depakote: Unsure  Olanzapine: No    Protocols used: Headache-Adult-OH

## 2025-03-13 NOTE — TELEPHONE ENCOUNTER
Outbound call made to Patient to triage unsuccessful, no answer. CTS left a voicemail asking Patient to call back for triage.

## 2025-03-14 RX ORDER — DEXAMETHASONE 1 MG
TABLET ORAL
Qty: 7 TABLET | Refills: 0 | Status: SHIPPED | OUTPATIENT
Start: 2025-03-14

## 2025-03-14 NOTE — TELEPHONE ENCOUNTER
Spoke with pt and reviewed message below. Pt will  the medication and call the office next week with an update on  how she is feeling. Appreciates provider sending in medication.

## 2025-03-18 ENCOUNTER — OFFICE VISIT (OUTPATIENT)
Dept: UROLOGY | Facility: CLINIC | Age: 60
End: 2025-03-18
Payer: COMMERCIAL

## 2025-03-18 VITALS
BODY MASS INDEX: 25.83 KG/M2 | WEIGHT: 155 LBS | OXYGEN SATURATION: 99 % | HEIGHT: 65 IN | SYSTOLIC BLOOD PRESSURE: 132 MMHG | HEART RATE: 74 BPM | DIASTOLIC BLOOD PRESSURE: 90 MMHG

## 2025-03-18 DIAGNOSIS — N32.81 OAB (OVERACTIVE BLADDER): Primary | ICD-10-CM

## 2025-03-18 DIAGNOSIS — N39.0 RECURRENT UTI: ICD-10-CM

## 2025-03-18 PROCEDURE — 99213 OFFICE O/P EST LOW 20 MIN: CPT | Performed by: PHYSICIAN ASSISTANT

## 2025-03-18 RX ORDER — ASCORBIC ACID 500 MG
500 TABLET ORAL 2 TIMES DAILY
Qty: 60 TABLET | Refills: 3 | Status: SHIPPED | OUTPATIENT
Start: 2025-03-18

## 2025-03-18 NOTE — PROGRESS NOTES
Name: Tamela Carrnaza      : 1965      MRN: [id number]  Encounter Provider: Windy Orta PA-C  Encounter Date: 3/18/2025   Encounter department: [facility] UROLOGY [place] END  :  Assessment & Plan  OAB (overactive bladder)  More urgency at baseline, having some urge and stress based urinary incontinence this year which is new.  She is wearing a panty liner now.  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  Talked about timed voiding, avoiding bladder irritants in the diet and fluids, pelvic floor physical therapy to maximize her bladder storage and signals, as well as pharmacotherapy.  She would like combination therapy-will start Gemtesa as well as pelvic floor physical therapy.  I think she has a lot to gain from PT both for the urgency and for her incontinence  Orders:    Vibegron 75 MG TABS; Take 75 mg by mouth in the morning    Ambulatory Referral to Physical Therapy; Future    Recurrent UTI  She will continue her regimen of vaginal estrogen for atrophic vaginitis, Hiprex twice a day add vitamin C for better absorption, her urine pH was 7, also continue her probiotic and cranberry combination  She notes an odor to her urine, this is present when she has a UTI and when she does not have UTI.  I am unsure if the methenamine is causing any of the odd smell.  Orders:    ascorbic acid (VITAMIN C) 500 MG tablet; Take 1 tablet (500 mg total) by mouth 2 (two) times a day    Ambulatory Referral to Physical Therapy; Future        History of Present Illness   Tamela Carranza is a 59 y.o. female who presents annual follow-up recurrent UTI over several years.  Current regimen includes vaginal Estrace 3 times weekly, Hiprex, home probiotics and cranberry.  Postcoital Keflex.  Last culture positivity was 2023.  She is sexually active.  No hematuria.  Bowels okay.    Pansensitive E. coli 2023, April and   "2024, negative culture August 2024.  No UTI symptoms in recent months.  She continues to note odd smell or odor to the urine and baseline urinary urgency with mixed incontinence is more bothersome this year than last, now wearing 1 panty liner.  She was not sure if physical therapy would be helpful for her since some friends told her they did not have much relief.    Review of Systems   Constitutional: Negative.    Respiratory: Negative.     Cardiovascular: Negative.    Genitourinary:  Positive for frequency and urgency. Negative for decreased urine volume, difficulty urinating, dysuria, flank pain, hematuria and pelvic pain.   Musculoskeletal: Negative.           Objective   /90 (BP Location: Left arm, Patient Position: Sitting, Cuff Size: Adult)   Pulse 74   Ht 5' 5\" (1.651 m)   Wt 70.3 kg (155 lb)   LMP  (LMP Unknown)   SpO2 99%   BMI 25.79 kg/m²     Physical Exam  Vitals and nursing note reviewed.   Constitutional:       Appearance: She is well-developed.   HENT:      Head: Normocephalic and atraumatic.   Pulmonary:      Effort: Pulmonary effort is normal.   Skin:     General: Skin is warm.   Neurological:      Mental Status: She is alert and oriented to person, place, and time.           Results   No results found for: \"PSA\"  Lab Results   Component Value Date    CALCIUM 8.9 01/11/2025    K 4.0 01/11/2025    CO2 29 01/11/2025     01/11/2025    BUN 28 (H) 01/11/2025    CREATININE 0.68 01/11/2025     Lab Results   Component Value Date    WBC 5.19 03/01/2025    HGB 12.9 03/01/2025    HCT 41.4 03/01/2025    MCV 91 03/01/2025     03/01/2025       Office Urine Dip  No results found for this or any previous visit (from the past hour).      "

## 2025-03-19 ENCOUNTER — TELEPHONE (OUTPATIENT)
Age: 60
End: 2025-03-19

## 2025-03-19 DIAGNOSIS — N39.0 RECURRENT UTI: ICD-10-CM

## 2025-03-19 DIAGNOSIS — N32.81 OAB (OVERACTIVE BLADDER): Primary | ICD-10-CM

## 2025-03-19 RX ORDER — METHENAMINE HIPPURATE 1000 MG/1
1 TABLET ORAL 2 TIMES DAILY WITH MEALS
Qty: 60 TABLET | Refills: 5 | Status: SHIPPED | OUTPATIENT
Start: 2025-03-19

## 2025-03-19 RX ORDER — MIRABEGRON 50 MG/1
50 TABLET, FILM COATED, EXTENDED RELEASE ORAL DAILY
Qty: 30 TABLET | Refills: 2 | Status: SHIPPED | OUTPATIENT
Start: 2025-03-19 | End: 2025-06-17

## 2025-03-19 NOTE — TELEPHONE ENCOUNTER
PA for VIBEGRON DENIED    Reason:(Screenshot if applicable)    MIRABEGR[employer] FORMUL[employer]    Message sent to office clinical pool Yes    Denial letter scanned into [employer] NO WILL SCAN UPON RECEIPT        **Please follow up with your patient regarding denial and next steps**

## 2025-03-19 NOTE — TELEPHONE ENCOUNTER
Contacted Tamela and made her aware Vibegron was denied by insurance. Advised that Myrbetriq was sent to her pharmacy as an alternative and can try GoodRx coupon if medication is too expensive.

## 2025-03-19 NOTE — TELEPHONE ENCOUNTER
PA for VIBEGRON 75 MG SUBMITTED to [employer]    via    [x]CMM-KEY: [id number]      [x]PA sent as URGENT    All office notes, labs and other pertaining documents and studies sent. Clinical questions answered. Awaiting determination from insurance company.     Turnaround time for your insurance to make a decision on your Prior Authorization can take 7-21 business days.

## 2025-03-20 DIAGNOSIS — G43.709 CHRONIC MIGRAINE WITHOUT AURA WITHOUT STATUS MIGRAINOSUS, NOT INTRACTABLE: Primary | ICD-10-CM

## 2025-03-20 RX ORDER — DIVALPROEX SODIUM 250 MG/1
TABLET, DELAYED RELEASE ORAL
Qty: 5 TABLET | Refills: 0 | Status: SHIPPED | OUTPATIENT
Start: 2025-03-20

## 2025-03-26 ENCOUNTER — OFFICE VISIT (OUTPATIENT)
Dept: CARDIOLOGY CLINIC | Facility: CLINIC | Age: 60
End: 2025-03-26
Payer: COMMERCIAL

## 2025-03-26 VITALS
WEIGHT: 158 LBS | DIASTOLIC BLOOD PRESSURE: 70 MMHG | BODY MASS INDEX: 26.33 KG/M2 | HEART RATE: 86 BPM | OXYGEN SATURATION: 94 % | HEIGHT: 65 IN | SYSTOLIC BLOOD PRESSURE: 108 MMHG

## 2025-03-26 DIAGNOSIS — E78.2 MIXED HYPERLIPIDEMIA: ICD-10-CM

## 2025-03-26 DIAGNOSIS — I25.10 CORONARY ARTERY DISEASE INVOLVING NATIVE CORONARY ARTERY OF NATIVE HEART WITHOUT ANGINA PECTORIS: Primary | ICD-10-CM

## 2025-03-26 DIAGNOSIS — G47.33 OSA (OBSTRUCTIVE SLEEP APNEA): ICD-10-CM

## 2025-03-26 DIAGNOSIS — R07.89 OTHER CHEST PAIN: ICD-10-CM

## 2025-03-26 PROCEDURE — 99244 OFF/OP CNSLTJ NEW/EST MOD 40: CPT | Performed by: INTERNAL MEDICINE

## 2025-03-26 PROCEDURE — 93000 ELECTROCARDIOGRAM COMPLETE: CPT | Performed by: INTERNAL MEDICINE

## 2025-03-26 RX ORDER — ROSUVASTATIN CALCIUM 5 MG/1
5 TABLET, COATED ORAL DAILY
Qty: 90 TABLET | Refills: 3 | Status: SHIPPED | OUTPATIENT
Start: 2025-03-26

## 2025-03-26 NOTE — PROGRESS NOTES
Tamela Carranza  1965  [id number]  [facility] [place]  [address]  [place] [address] [phone]    1. Coronary artery disease involving native coronary artery of native heart without angina pectoris  POCT ECG    Stress test only, exercise    Echo complete w/ contrast if indicated    rosuvastatin (CRESTOR) 5 mg tablet    Comprehensive metabolic panel      2. Mixed hyperlipidemia  Ambulatory Referral to Cardiology    rosuvastatin (CRESTOR) 5 mg tablet      3. ANTONIETTA (obstructive sleep apnea)        4. Other chest pain  POCT ECG    Stress test only, exercise    Echo complete w/ contrast if indicated    rosuvastatin (CRESTOR) 5 mg tablet    Comprehensive metabolic panel          Discussion/Summary: Today is my first visit with the patient.  Mildly elevated calcium score initiate Crestor 5 mg daily and will slowly uptitrate.  Goal LDL will be 70.  She is a little apprehensive about starting medications and we will go slow and uptitrate.  She does have some atypical chest pain is nonexertional recommend treadmill only stress test and an echocardiogram.  Blood pressure is well-controlled.  Follow-up CMP in 2 weeks repeat lipids this summer.  Await testing to make further recommendations.    Interval History: Very pleasant 59-year-old female presents today for consultation on behalf of Gregoria DENNIS for abnormal calcium score.  Patient has a good functional capacity denies any exertional chest pain or discomfort.  She can walk climb stairs and do Pilates with no discomfort.  Sometimes at rest she will get a sensation in her upper chest that feels like it radiates into the back of her throat.  She has attributed this to reflux.  Denies any palpitations or fluttering.  There has been no shortness of breath.  Denies any lower extremity edema, PND, orthopnea.  She is a non-smoker.  Rare alcohol use.    Medical Problems       Problem List       Chronic migraine without  aura without status migrainosus, not intractable    Gastroesophageal reflux disease without esophagitis    Degenerative cervical disc    Irritable bowel syndrome    Dense breast    Primary open angle glaucoma (POAG) of both eyes    Family history of breast cancer    ANTONIETTA (obstructive sleep apnea)    Coronary artery disease involving native coronary artery of native heart without angina pectoris        Past Medical History:   Diagnosis Date    Amenorrhea     Anemia     Cluster headache     Contact dermatitis     last assessed: 6/17/2013    Crohn's disease (HCC)     last assessed: 8/8/2012. Neg for Crohns based on last GI eval and path.     Degenerative disc disease, cervical     Folliculitis     last assessed: 7/30/2013    Headache, tension-type     Hyperlipidemia     IBS (irritable bowel syndrome)     Menorrhagia     Migraine     Vitamin D deficiency disease     last assessed: 5/17/2016     Social History     Socioeconomic History    Marital status: /Civil Union     Spouse name: Not on file    Number of children: Not on file    Years of education: Not on file    Highest education level: Not on file   Occupational History    Not on file   Tobacco Use    Smoking status: Never    Smokeless tobacco: Never   Vaping Use    Vaping status: Never Used   Substance and Sexual Activity    Alcohol use: Yes     Alcohol/week: 1.0 standard drink of alcohol     Types: 1 Cans of beer per week     Comment: social     Drug use: Never    Sexual activity: Yes     Partners: Male     Birth control/protection: Male Sterilization   Other Topics Concern    Not on file   Social History Narrative    Not on file     Social Drivers of Health     Financial Resource Strain: Not on file   Food Insecurity: Not on file   Transportation Needs: Not on file   Physical Activity: Not on file   Stress: Not on file   Social Connections: Not on file   Intimate Partner Violence: Not on file   Housing Stability: Not on file      Family History   Problem  Relation Age of Onset    Arthritis Mother     Ulcerative colitis Mother     Glaucoma Mother     Seizures Mother     Stroke Mother     Breast cancer Mother 77    Migraines Mother     Parkinsonism Mother     Diabetes Mother     Hypertension Father     Lymphoma Father         non hodkins    No Known Problems Maternal Aunt     No Known Problems Paternal Aunt     No Known Problems Maternal Grandmother     No Known Problems Maternal Grandfather     No Known Problems Paternal Grandmother     No Known Problems Paternal Grandfather     Raynaud syndrome Daughter     Supraventricular tachycardia Daughter     No Known Problems Daughter     No Known Problems Daughter      Past Surgical History:   Procedure Laterality Date     SECTION      DILATION AND CURETTAGE OF UTERUS      ENDOMETRIAL ABLATION      ESOPHAGOGASTRODUODENOSCOPY      LASER ABLATION OF VASCULAR LESION      CT COLONOSCOPY FLX DX W/COLLJ SPEC WHEN PFRMD N/A 9/15/2016    Procedure: COLONOSCOPY;  Surgeon: Gloria Madison DO;  Location: [facility];  Service: Gastroenterology       Current Outpatient Medications:     busPIRone (BUSPAR) 5 mg tablet, Take 1 tablet (5 mg total) by mouth 2 (two) times a day, Disp: 180 tablet, Rfl: 0    cholecalciferol (VITAMIN D3) 400 units tablet, Take 400 Units by mouth daily, Disp: , Rfl:     Cyanocobalamin (VITAMIN B12 PO), Take 1 tablet by mouth daily, Disp: , Rfl:     cycloSPORINE (RESTASIS) 0.05 % ophthalmic emulsion, Restasis 0.05 % eye drops in a dropperette, Disp: , Rfl:     cyproheptadine (PERIACTIN) 4 mg tablet, TAKE ONE TABLET BY MOUTH AT BEDTIME AS NEEDED for weather related headaches, Disp: 30 tablet, Rfl: 2    dicyclomine (BENTYL) 20 mg tablet, Take 1 tablet (20 mg total) by mouth 3 (three) times a day as needed (abdominal cramping), Disp: 30 tablet, Rfl: 5    Galcanezumab-gnlm (Emgality) 120 MG/ML SOAJ, Inject 1 ml under the skin monthly, Disp: 1 mL, Rfl: 11    latanoprost (XALATAN) 0.005 % ophthalmic solution,  1 drop daily at bedtime, Disp: , Rfl:     magnesium oxide (MAG-OX) 400 mg, Take 250 mg by mouth daily, Disp: , Rfl:     methenamine hippurate (HIPREX) 1 g tablet, Take 1 tablet (1 g total) by mouth 2 (two) times a day with meals, Disp: 60 tablet, Rfl: 5    nortriptyline (PAMELOR) 25 mg capsule, Take 1 capsule (25 mg total) by mouth daily at bedtime, Disp: 90 capsule, Rfl: 2    Riboflavin (VITAMIN B-2 PO), Take 1 tablet by mouth daily, Disp: , Rfl:     rosuvastatin (CRESTOR) 5 mg tablet, Take 1 tablet (5 mg total) by mouth daily, Disp: 90 tablet, Rfl: 3    Ubrogepant (UBRELVY) 100 MG tablet, Take 1 tablet (100 mg) one time as needed for migraine. May repeat one additional tablet (100 mg) at least two hours after the first dose. Do not use more than two doses per day, or for more than eight days per month., Disp: 8 tablet, Rfl: 2    Varenicline Tartrate (Tyrvaya) 0.03 MG/ACT SOLN, 1 spray into each nostril 2 (two) times a day, Disp: , Rfl:     venlafaxine (EFFEXOR-XR) 37.5 mg 24 hr capsule, Take 1 capsule (37.5 mg total) by mouth daily Take in addition to 75 mg capsule (total of 112.5 mg daily), Disp: 90 capsule, Rfl: 2    venlafaxine (EFFEXOR-XR) 75 mg 24 hr capsule, Take 1 capsule (75 mg total) by mouth daily, Disp: 90 capsule, Rfl: 2    ascorbic acid (VITAMIN C) 500 MG tablet, Take 1 tablet (500 mg total) by mouth 2 (two) times a day, Disp: 60 tablet, Rfl: 3    dexamethasone (DECADRON) 1 mg tablet, Take 2 tabs daily for 2 days then 1 tab daily for 3 days., Disp: 7 tablet, Rfl: 0    divalproex sodium (Depakote) 250 mg DR tablet, Take 1 tab at bedtime for 5 days, Disp: 5 tablet, Rfl: 0    estradiol (ESTRACE VAGINAL) 0.1 mg/g vaginal cream, Insert 1 g into the vagina 3 (three) times a week, Disp: 42.5 g, Rfl: 0    Mirabegron ER (Myrbetriq) 50 MG TB24, Take 1 tablet (50 mg total) by mouth in the morning, Disp: 30 tablet, Rfl: 2  Allergies   Allergen Reactions    Sulfa Antibiotics        Labs:     Chemistry       "  Component Value Date/Time    K 4.0 01/11/2025 0813    K 4.1 12/13/2022 0752     01/11/2025 0813     12/13/2022 0752    CO2 29 01/11/2025 0813    CO2 28 12/13/2022 0752    BUN 28 (H) 01/11/2025 0813    BUN 19 12/13/2022 0752    CREATININE 0.68 01/11/2025 0813    CREATININE 0.72 12/13/2022 0752        Component Value Date/Time    CALCIUM 8.9 01/11/2025 0813    CALCIUM 9.4 12/13/2022 0752    ALKPHOS 41 01/11/2025 0813    ALKPHOS 56 12/13/2022 0752    AST 17 01/11/2025 0813    AST 13 12/13/2022 0752    ALT 22 01/11/2025 0813    ALT 29 12/13/2022 0752            No results found for: \"CHOL\"  Lab Results   Component Value Date    HDL 70 01/11/2025    HDL 70 12/15/2023    HDL 66 03/30/2019     Lab Results   Component Value Date    LDLCALC 214 (H) 01/11/2025    LDLCALC 154 (H) 12/15/2023    LDLCALC 140 (H) 03/30/2019     Lab Results   Component Value Date    TRIG 86 01/11/2025    TRIG 90 12/15/2023    TRIG 46 03/30/2019     Lab Results   Component Value Date    CHOLHDL 3.3 03/30/2019       Imaging: No results found.    ECG: Normal sinus rhythm      Review of Systems   Constitutional: Negative.   HENT: Negative.     Eyes: Negative.    Cardiovascular:  Positive for chest pain. Negative for dyspnea on exertion, irregular heartbeat, leg swelling, near-syncope, orthopnea, palpitations and syncope.   Respiratory: Negative.     Endocrine: Negative.    Hematologic/Lymphatic: Negative.    Skin: Negative.    Musculoskeletal: Negative.    Gastrointestinal: Negative.    Genitourinary: Negative.    Neurological: Negative.    Psychiatric/Behavioral: Negative.     All other systems reviewed and are negative.      Vitals:    03/26/25 0834   BP: 108/70   Pulse: 86   SpO2: 94%     Vitals:    03/26/25 0834   Weight: 71.7 kg (158 lb)     Height: 5' 5\" (165.1 cm)   Body mass index is 26.29 kg/m².    Physical Exam:  Vital signs reviewed.  General appearance:  Appears stated age, alert, well appearing and in no distress  HEENT:  " PERRLA, EOMI, no scleral icterus, no conjunctival pallor  NECK:  Supple, No elevated JVP, no thyromegaly, no carotid bruits, no JVD  HEART:  Regular rate and rhythm, normal S1/S2, no S3/S4, no murmur or rub, PMI nondisplaced  LUNGS:  Clear to auscultation bilaterally, no wheezes rales or rhonchi  ABDOMEN:  Soft, non-tender, positive bowel sounds, no rebound or guarding, no organomegaly   EXTREMITIES:  Normal range of motion.  No clubbing or cyanosis. No edema  VASCULAR:  Normal pedal pulses   SKIN: No lesions or rashes on exposed skin  NEURO:  CN II-XII intact, no focal deficits

## 2025-03-31 DIAGNOSIS — F41.9 ANXIETY: ICD-10-CM

## 2025-03-31 DIAGNOSIS — R45.4 IRRITABILITY: ICD-10-CM

## 2025-03-31 RX ORDER — BUSPIRONE HYDROCHLORIDE 5 MG/1
10 TABLET ORAL 2 TIMES DAILY
Qty: 180 TABLET | Refills: 0 | Status: SHIPPED | OUTPATIENT
Start: 2025-03-31

## 2025-04-10 ENCOUNTER — APPOINTMENT (OUTPATIENT)
Dept: LAB | Age: 60
End: 2025-04-10
Payer: COMMERCIAL

## 2025-04-10 DIAGNOSIS — I25.10 CORONARY ARTERY DISEASE INVOLVING NATIVE CORONARY ARTERY OF NATIVE HEART WITHOUT ANGINA PECTORIS: ICD-10-CM

## 2025-04-10 DIAGNOSIS — R07.89 OTHER CHEST PAIN: ICD-10-CM

## 2025-04-10 LAB
ALBUMIN SERPL BCG-MCNC: 4.7 G/DL (ref 3.5–5)
ALP SERPL-CCNC: 41 U/L (ref 34–104)
ALT SERPL W P-5'-P-CCNC: 23 U/L (ref 7–52)
ANION GAP SERPL CALCULATED.3IONS-SCNC: 10 MMOL/L (ref 4–13)
AST SERPL W P-5'-P-CCNC: 23 U/L (ref 13–39)
BILIRUB SERPL-MCNC: 0.37 MG/DL (ref 0.2–1)
BUN SERPL-MCNC: 32 MG/DL (ref 5–25)
CALCIUM SERPL-MCNC: 9.3 MG/DL (ref 8.4–10.2)
CHLORIDE SERPL-SCNC: 103 MMOL/L (ref 96–108)
CO2 SERPL-SCNC: 28 MMOL/L (ref 21–32)
CREAT SERPL-MCNC: 0.73 MG/DL (ref 0.6–1.3)
GFR SERPL CREATININE-BSD FRML MDRD: 90 ML/MIN/1.73SQ M
GLUCOSE P FAST SERPL-MCNC: 79 MG/DL (ref 65–99)
POTASSIUM SERPL-SCNC: 4.4 MMOL/L (ref 3.5–5.3)
PROT SERPL-MCNC: 7.1 G/DL (ref 6.4–8.4)
SODIUM SERPL-SCNC: 141 MMOL/L (ref 135–147)

## 2025-04-10 PROCEDURE — 36415 COLL VENOUS BLD VENIPUNCTURE: CPT

## 2025-04-10 PROCEDURE — 80053 COMPREHEN METABOLIC PANEL: CPT

## 2025-04-17 ENCOUNTER — HOSPITAL ENCOUNTER (OUTPATIENT)
Dept: NON INVASIVE DIAGNOSTICS | Facility: CLINIC | Age: 60
Discharge: HOME | End: 2025-04-17
Payer: COMMERCIAL

## 2025-04-17 ENCOUNTER — HOSPITAL ENCOUNTER (OUTPATIENT)
Dept: NON INVASIVE DIAGNOSTICS | Facility: CLINIC | Age: 60
End: 2025-04-17
Payer: COMMERCIAL

## 2025-04-17 VITALS
HEIGHT: 65 IN | WEIGHT: 158 LBS | SYSTOLIC BLOOD PRESSURE: 142 MMHG | BODY MASS INDEX: 26.33 KG/M2 | HEART RATE: 90 BPM | DIASTOLIC BLOOD PRESSURE: 98 MMHG | OXYGEN SATURATION: 99 %

## 2025-04-17 DIAGNOSIS — R07.89 OTHER CHEST PAIN: ICD-10-CM

## 2025-04-17 DIAGNOSIS — I25.10 CORONARY ARTERY DISEASE INVOLVING NATIVE CORONARY ARTERY OF NATIVE HEART WITHOUT ANGINA PECTORIS: ICD-10-CM

## 2025-04-17 LAB
MAX HR PERCENT: 101 %
MAX HR: 164 BPM
RATE PRESSURE PRODUCT: NORMAL
SL CV STRESS RECOVERY BP: NORMAL MMHG
SL CV STRESS RECOVERY HR: 106 BPM
SL CV STRESS RECOVERY O2 SAT: 99 %
SL CV STRESS STAGE REACHED: 3
STRESS ANGINA INDEX: 0
STRESS BASELINE BP: NORMAL MMHG
STRESS BASELINE HR: 90 BPM
STRESS DUKE TREADMILL SCORE: 9
STRESS O2 SAT REST: 99 %
STRESS PEAK HR: 164 BPM
STRESS POST ESTIMATED WORKLOAD: 10.1 METS
STRESS POST EXERCISE DUR MIN: 9 MIN
STRESS POST EXERCISE DUR SEC: 0 SEC
STRESS POST O2 SAT PEAK: 99 %
STRESS POST PEAK BP: 202 MMHG
STRESS ST DEPRESSION: 0 MM

## 2025-04-17 PROCEDURE — 93018 CV STRESS TEST I&R ONLY: CPT | Performed by: INTERNAL MEDICINE

## 2025-04-17 PROCEDURE — 93016 CV STRESS TEST SUPVJ ONLY: CPT | Performed by: INTERNAL MEDICINE

## 2025-04-17 PROCEDURE — 93017 CV STRESS TEST TRACING ONLY: CPT

## 2025-04-18 ENCOUNTER — RESULTS FOLLOW-UP (OUTPATIENT)
Dept: CARDIOLOGY CLINIC | Facility: CLINIC | Age: 60
End: 2025-04-18

## 2025-04-18 LAB
CHEST PAIN STATEMENT: NORMAL
MAX DIASTOLIC BP: 98 MMHG
MAX PREDICTED HEART RATE: 161 BPM
PROTOCOL NAME: NORMAL
REASON FOR TERMINATION: NORMAL
STRESS POST EXERCISE DUR MIN: 9 MIN
STRESS POST EXERCISE DUR SEC: 0 SEC
STRESS POST PEAK HR: 164 BPM
STRESS POST PEAK SYSTOLIC BP: 202 MMHG
TARGET HR FORMULA: NORMAL
TEST INDICATION: NORMAL

## 2025-04-18 NOTE — TELEPHONE ENCOUNTER
Pt called back for her Stress test results. Provided results per Dr. Nowak's message. Pt verbalized understanding and was pleased.

## 2025-05-10 DIAGNOSIS — R45.4 IRRITABILITY: ICD-10-CM

## 2025-05-10 DIAGNOSIS — F41.9 ANXIETY: ICD-10-CM

## 2025-05-11 RX ORDER — BUSPIRONE HYDROCHLORIDE 5 MG/1
10 TABLET ORAL 2 TIMES DAILY
Qty: 180 TABLET | Refills: 1 | Status: SHIPPED | OUTPATIENT
Start: 2025-05-11

## 2025-05-16 ENCOUNTER — TELEPHONE (OUTPATIENT)
Age: 60
End: 2025-05-16

## 2025-05-16 NOTE — TELEPHONE ENCOUNTER
Pt called to r/s appt. There were no remaining appt slots left with Gregoria Blackburn. Please advise who pt would best be seen with and I will r/s accordingly.       Thank you

## 2025-05-31 DIAGNOSIS — G43.709 CHRONIC MIGRAINE WITHOUT AURA WITHOUT STATUS MIGRAINOSUS, NOT INTRACTABLE: ICD-10-CM

## 2025-05-31 DIAGNOSIS — N32.81 OAB (OVERACTIVE BLADDER): ICD-10-CM

## 2025-06-01 ENCOUNTER — PATIENT MESSAGE (OUTPATIENT)
Dept: NEUROLOGY | Facility: CLINIC | Age: 60
End: 2025-06-01

## 2025-06-02 ENCOUNTER — TELEPHONE (OUTPATIENT)
Dept: NEUROLOGY | Facility: CLINIC | Age: 60
End: 2025-06-02

## 2025-06-02 RX ORDER — MIRABEGRON 50 MG/1
50 TABLET, FILM COATED, EXTENDED RELEASE ORAL DAILY
Qty: 30 TABLET | Refills: 5 | Status: SHIPPED | OUTPATIENT
Start: 2025-06-02 | End: 2025-08-31

## 2025-06-02 NOTE — TELEPHONE ENCOUNTER
Reason for call:   [x] Prior Auth  [] Other:     Caller:  [] Patient  [x] Pharm[facility]  Name:   Address:   Callback Number:     Medication: Galcanezumab-gnlm (Emgality) 120 MG/ML SOAJ     Dose/Frequency: Inject 1 ml under the skin monthly     Quantity: 1 ml    Ordering Provider:   [] PCP/Provider -   [x] Speciality/Provider - neuro    Has the patient tried other medications and failed? If failed, which medications did they fail?    [] No   [] Yes -     Is the patient's insurance updated in EPIC?   [] Yes   [] No     Is a copy of the patient's insurance scanned in EPIC?   [] Yes   [] No

## 2025-06-03 NOTE — TELEPHONE ENCOUNTER
PA for emgality renewal submitted via [facility], key [id number]    Recd approval via [facility]: Your request has been approved  Your request was approved based on the initial information provided at the time of the coverage request submission. Please allow additional time for the final decision to be made and added to the patient's account.     My chart message sent to patient to advise.

## 2025-06-04 ENCOUNTER — EVALUATION (OUTPATIENT)
Age: 60
End: 2025-06-04
Attending: PHYSICIAN ASSISTANT
Payer: COMMERCIAL

## 2025-06-04 DIAGNOSIS — N39.46 MIXED INCONTINENCE: ICD-10-CM

## 2025-06-04 DIAGNOSIS — N32.81 OVERACTIVE BLADDER: Primary | ICD-10-CM

## 2025-06-04 PROCEDURE — 97162 PT EVAL MOD COMPLEX 30 MIN: CPT | Performed by: PHYSICAL THERAPIST

## 2025-06-04 NOTE — PROGRESS NOTES
PT Evaluation     Today's date: 2025  Patient name: Tamela Carranza  : 1965  MRN: [id number]  Referring provider: Windy Orta PA-C  Dx:   Encounter Diagnosis     ICD-10-CM    1. Overactive bladder  N32.81       2. Mixed incontinence  N39.46                      Assessment  Impairments: abnormal coordination, abnormal muscle firing, impaired physical strength and lacks appropriate home exercise program    Assessment details: Tamela Carranza is a 59 y.o. female who presents to physical therapy with Overactive bladder  (primary encounter diagnosis) and Mixed incontinence. Internal assessment was performed following verbal consent from pt. Functional impairments include poor ability to delay urinary urge, leakage with coughing, sneezing, and walking, and occasional nocturia. Physical findings include pelvic floor weakness, fair pelvic floor endurance, and fair coordination among breathing, core, and pelvic floor muscles. Tamela Carranza would benefit from formal physical therapy to address impairments as detailed, decrease leakage, and restore maximal level of function for all home, work, and mobility tasks. Thank you for this referral.    Understanding of Dx/Px/POC: good     Prognosis: good    Goals  Short term goals (to be met in 4 weeks):  1. Pt will improve pelvic floor strength by at least 1 grade.  2. Pt will walk for fitness without leakage.    Long term goals (to be met by discharge):  1. Pt will be compliant with HEP.  2. Pt will cough and sneeze without leakage.  3. Pt will perform all athletic activities without leakage.      Plan  Patient would benefit from: PT eval and skilled physical therapy  Planned modality interventions: biofeedback    Planned therapy interventions: abdominal trunk stabilization, activity modification, joint mobilization, manual therapy, massage, Mtz taping, neuromuscular re-education, patient education, postural training, strengthening, stretching, therapeutic  activities, therapeutic exercise, behavior modification, body mechanics training, breathing training, home exercise program, IASTM and kinesiology taping    Frequency: 1x week  Duration in weeks: 12  Treatment plan discussed with: patient      PT Pelvic Floor Subjective:   History of Present Illness:   Tamela is on different medications for her bladder. 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 She also has leakage with walking. She has frequency of urination. She also has leakage with sneezing and laughing. She states that this has been going on for a while, at least 2 years. She had frequent bladder infections, now handled with medication. She has had some improvement since starting Myrbetriq and is also taking Azo for vaginal health.  Social Support:     Lives in:  Multiple-level home (bathroom on every floor)    Lives with:  Spouse and young children    Relationship status: /committed    Work status: employed full time (teaching)  Diet and Exercise:    Diet:balanced nutrition    Exercise type: walking and biking    Exercise frequency: 3-4 times per week    Pilates at [employer]  OB/ gyn History    Gestational History:     Prior Pregnancy: Yes      Number of prior pregnancies: 3    Number of term pregnancies: 3    Delivery Type:  section      Number of caesarian sections: 1    Number of vaginal deliveries after caesarian: 2    Delivery Complications:  Small tear with a few stitches    Menstrual History:      Menopausal: menopause (had ablation many years ago)  no hormone replacement therapy  Bladder Function:     Voiding Difficulties positive for: urgency (is able to hold a little longer than before) and frequent urination      Voiding Difficulties negative for: hesitancy, straining and incomplete emptying (has to wait a minute for more to come out)       Voiding Difficulties comments:     Voiding  frequency: every 1-2 hours    Urinary leakage: urine leakage    Urinary leakage aggravated by: coughing and sneezing    Urinary leakage not aggravated by: post-void dribble    Nocturia (episodes per night): 0 and 1    Painful urination: No      Fluid Intake Type:  Coffee and water  Incontinence Management:     Pads/Diaper Use:  None    Pads/Diapers Additional Comments: formerly wore liners    Patient has attempted at least 4 weeks of independent pelvic floor strengthening exercises without a resolution of symptoms  Bowel Function:     Voiding DIfficulties: urgency      Bowel frequency: daily and every 2 days  Sexual Function:     Sexually Active:  Sexually active    Pain during intercourse: No    Pain:     No pain reported by patient.  Diagnostic Tests:     None    Treatments: no  Patient Goals:     Patient goals for therapy:  Fully empty bladder or bowels, improved bladder or bowel function and improved quality of life    Other patient goals:  Be able to not dribble, not needing to wear a pad when going for a walk, more control    Objective     Static Posture     Comments  Neg Seema's B  Posture WNL, RSFH posture    Active Range of Motion     Lumbar   Flexion:  WFL  Extension:  WFL  Left lateral flexion:  WFL  Right lateral flexion:  WFL  Left rotation:  WFL  Right rotation:  WFL    Strength/Myotome Testing     Left Hip   Planes of Motion   Flexion: 5  Abduction: 4  External rotation: 5  Internal rotation: 5    Right Hip   Planes of Motion   Flexion: 5  Abduction: 4 (slight tingle w/ MMT, resolved afterward)  External rotation: 5  Internal rotation: 5    Tests     Left Hip   Negative NAMRATAYAMINI and Gladyset's.   SLR: Negative.     Right Hip   Negative NAMRATAYAMINI and Gillet's.   SLR: Negative.       Abdominal Assessment:      Position: supine exam  Abdominal Assessment: No TTP  More chest than ribs with diaphragmatic breath    Diastatis   Diastasis recti present? no  Connective tissue integrity at linea alba:  firm  no tenderness at linea alba  able to engage transverse abdominis     Visual Inspection of Perineum:   Excursion of perineal body in cephalad direction with contraction of pelvic floor muscles (PFM): good  Excursion of perineal body in caudal direction with relaxation of pelvic floor muscles (PFM): good   Involuntary contraction with coughing: yes (paradoxical, able to correct with cuing)  Involuntary relaxation with bearing down: yes  Cotton swab test: non-tender  Cough reflex: cough reflex  Sphincter Tone Resting: normal  Sphincter Tone Squeeze: normal and weak  Sensation: intact    Pelvic Organ Prolapse   Position: hook-lying  At rest: none        Pelvic Floor Muscle Exam:     Muscle Contraction: well isolated   Breathing pattern with contraction: within normal limits   Pelvic floor muscle relaxation is complete.         PERFECT Score   Power right: 3+/5   Power left: 3+/5   Endurance (seconds to max): 6   Repetitions (before fatigue): 6   Fast flicks (in 10 seconds): 7   Perfect Score: No TTP      pelvic floor exam consent given by patient    Pelvic exam completed: vaginally            Precautions: Problem List[1]        Manuals 6/4                                                                Neuro Re-Ed             PF iso stand Submax 5-10W/5-10R            Core stability w/ PF nv                                                                             Ther Ex                                                                                                                     Ther Activity             The knack reviewed                         Gait Training                                       Modalities             biofeedback prn                                     [1]   Patient Active Problem List  Diagnosis    Chronic migraine without aura without status migrainosus, not intractable    Gastroesophageal reflux disease without esophagitis    Degenerative cervical disc    Irritable bowel syndrome     Dense breast    Primary open angle glaucoma (POAG) of both eyes    Family history of breast cancer    ANTONIETTA (obstructive sleep apnea)    Coronary artery disease involving native coronary artery of native heart without angina pectoris

## 2025-06-05 ENCOUNTER — TELEPHONE (OUTPATIENT)
Dept: OTHER | Facility: HOSPITAL | Age: 60
End: 2025-06-05

## 2025-06-05 NOTE — PATIENT COMMUNICATION
Spoke to pt to schedule Ovl with headache provider. Scheduled OVL with Keny Lima in [place] on 7/24/25.

## 2025-06-09 ENCOUNTER — OFFICE VISIT (OUTPATIENT)
Age: 60
End: 2025-06-09
Attending: PHYSICIAN ASSISTANT
Payer: COMMERCIAL

## 2025-06-09 DIAGNOSIS — N39.46 MIXED INCONTINENCE: ICD-10-CM

## 2025-06-09 DIAGNOSIS — N32.81 OVERACTIVE BLADDER: Primary | ICD-10-CM

## 2025-06-09 PROCEDURE — 97112 NEUROMUSCULAR REEDUCATION: CPT | Performed by: PHYSICAL THERAPIST

## 2025-06-09 PROCEDURE — 97110 THERAPEUTIC EXERCISES: CPT | Performed by: PHYSICAL THERAPIST

## 2025-06-09 NOTE — PROGRESS NOTES
"Daily Note     Today's date: 2025  Patient name: Tamela Carranza  : 1965  MRN: [id number]  Referring provider: Windy Orta PA-C  Dx:   Encounter Diagnosis     ICD-10-CM    1. Overactive bladder  N32.81       2. Mixed incontinence  N39.46                      Subjective: Tamela does not have any questions after IE and was doing the exercises over the weekend. She had a little leakage over the past few days.      Objective: See treatment diary below      Assessment: Tolerated treatment well. Patient demonstrated fatigue post treatment, exhibited good technique with therapeutic exercises, and would benefit from continued PT to decrease urinary frequency and leakage. Tamela had good tolerance to initiation of exercises. She noted some feeling of increased muscle effort over glute med with standing exercises. She was given updated HEP and verbalized understanding.      Plan: Continue per plan of care.  Progress treatment as tolerated.       Precautions: Problem List[1]        Manuals                                                                Neuro Re-Ed             PF iso stand Submax 5-10W/5-10R            TA iso nv 3\"x10           TA + PF   3\"x10           TA + PF + hip add  3\"x10           TA + PF + hip add + bridge  3\"x10           TA + PF + hip add + bridge + lats  3\"x10                        Ther Ex             TA + clamshells  20x ea           TA + reverse clams  20x ea           TA + hip abduction  20x ea           TA + PF + TB shoulder ext  Black 20x           TA + PF + TB low rows  Black 20x           TA + PF + TB push pulls  Black 20x ea           Side stepping  30 ft x2 laps ea                        Ther Activity             The knack reviewed                         Gait Training                                       Modalities             biofeedback prn                                              [1]   Patient Active Problem List  Diagnosis    Chronic migraine without aura " without status migrainosus, not intractable    Gastroesophageal reflux disease without esophagitis    Degenerative cervical disc    Irritable bowel syndrome    Dense breast    Primary open angle glaucoma (POAG) of both eyes    Family history of breast cancer    ANTONIETTA (obstructive sleep apnea)    Coronary artery disease involving native coronary artery of native heart without angina pectoris

## 2025-06-10 ENCOUNTER — OFFICE VISIT (OUTPATIENT)
Dept: FAMILY MEDICINE CLINIC | Facility: CLINIC | Age: 60
End: 2025-06-10
Payer: COMMERCIAL

## 2025-06-10 ENCOUNTER — APPOINTMENT (OUTPATIENT)
Dept: RADIOLOGY | Facility: MEDICAL CENTER | Age: 60
End: 2025-06-10
Payer: COMMERCIAL

## 2025-06-10 VITALS
DIASTOLIC BLOOD PRESSURE: 82 MMHG | BODY MASS INDEX: 25.39 KG/M2 | HEIGHT: 66 IN | TEMPERATURE: 98.5 F | HEART RATE: 87 BPM | WEIGHT: 158 LBS | SYSTOLIC BLOOD PRESSURE: 118 MMHG | OXYGEN SATURATION: 91 %

## 2025-06-10 DIAGNOSIS — R45.4 IRRITABILITY: ICD-10-CM

## 2025-06-10 DIAGNOSIS — S69.92XA FINGER INJURY, LEFT, INITIAL ENCOUNTER: Primary | ICD-10-CM

## 2025-06-10 DIAGNOSIS — S69.92XA FINGER INJURY, LEFT, INITIAL ENCOUNTER: ICD-10-CM

## 2025-06-10 DIAGNOSIS — Z78.0 POSTMENOPAUSAL: ICD-10-CM

## 2025-06-10 PROBLEM — G47.33 OSA (OBSTRUCTIVE SLEEP APNEA): Status: RESOLVED | Noted: 2024-02-28 | Resolved: 2025-06-10

## 2025-06-10 PROCEDURE — 99214 OFFICE O/P EST MOD 30 MIN: CPT

## 2025-06-10 PROCEDURE — 73130 X-RAY EXAM OF HAND: CPT

## 2025-06-10 RX ORDER — HYDROXYZINE HYDROCHLORIDE 10 MG/1
10 TABLET, FILM COATED ORAL EVERY 6 HOURS PRN
Qty: 30 TABLET | Refills: 0 | Status: SHIPPED | OUTPATIENT
Start: 2025-06-10

## 2025-06-10 NOTE — PATIENT INSTRUCTIONS
-Use heating pad to relax muscles; ice for acute pain  -Use ibuprofen (Advil) 400 mg every 4 hours as needed for pain OR naproxen (Aleve) 500 mg every 8 hours  -Go to ER if develop chest pain, weakness of arms/legs, loss of bladder/bowel control, or drooping of side of face and slurred speech

## 2025-06-10 NOTE — PROGRESS NOTES
Name: Tamela Carranza      : 1965      MRN: [id number]  Encounter Provider: Gina Cazares PA-C  Encounter Date: 6/10/2025   Encounter department: [facility]  :  Assessment & Plan  Finger injury, left, initial encounter  New diagnosis, acute, symptomatic  Third middle finger  PIP and DIP tests normal, patient is able to flex finger  Patient has nail polish on, unable to determine if capillary refill less than 2 seconds  2+ radial pulse left hand  Sensation intact  Swollen  -Will order x-ray of left finger and left hand due to tenderness and swelling  -Recommend buddy taping in the meantime and preventing flexion of the finger  -Ibuprofen for pain and ice for swelling  Orders:  •  XR finger left third digit-middle; Future  •  XR hand 3+ vw left; Future    Irritability  Acute on chronic, symptomatic  Patient feels like she is having waking from BuSpar  -Switch from BuSpar to hydroxyzine  Educated patient on medication usage, side effects, administration, risks and benefits. Patient stated understanding.    Orders:  •  hydrOXYzine HCL (ATARAX) 10 mg tablet; Take 1 tablet (10 mg total) by mouth every 6 (six) hours as needed for itching    Postmenopausal    Orders:  •  DXA bone density spine hip and pelvis; Future           History of Present Illness {?Quick Links Encounters * My Last Note * Last Note in Specialty * Snapshot * Since Last Visit * History :21923}  Fall on outstretches hand 1 month ago on stairs, jammed left middle finger  Can't get rings off cause swelling  Hurts with pressure and movement  Using ibuprofen for pain  Using ice  Still swollen  Still very painful  Still has sensation  Pain mainly in left middle finger, but also at base of fifth finger  Can still make a fist         Review of Systems   Respiratory:  Negative for shortness of breath.    Cardiovascular:  Negative for chest pain.   Musculoskeletal:  Positive for arthralgias.   Neurological:  Negative  "for tremors, weakness and numbness.       Objective {?Quick Links Trend Vitals * Enter New Vitals * Results Review * Timeline (Adult) * Labs * Imaging * Cardiology * Procedures * Lung Cancer Screening * Surgical eConsent :[id number]}  /82 (BP Location: Right arm, Patient Position: Sitting, Cuff Size: Standard)   Pulse 87   Temp 98.5 °F (36.9 °C)   Ht 5' 5.51\" (1.664 m)   Wt 71.7 kg (158 lb)   LMP  (LMP Unknown)   SpO2 91%   BMI 25.88 kg/m²      Physical Exam  Constitutional:       General: She is not in acute distress.     Appearance: She is well-developed.   HENT:      Head: Normocephalic and atraumatic.     Eyes:      Conjunctiva/sclera: Conjunctivae normal.     Pulmonary:      Effort: Pulmonary effort is normal. No respiratory distress.   Abdominal:      Palpations: Abdomen is soft.      Tenderness: There is no abdominal tenderness.     Musculoskeletal:         General: No swelling.      Right hand: Normal.      Left hand: Tenderness present. Normal range of motion. Normal strength. Normal sensation. Decreased capillary refill: unable to test.      Cervical back: Normal range of motion and neck supple.      Comments: Tenderness along base of left middle finger, tenderness along the metacarpals, tenderness along the DIP and the PIP joint, as well as the CMC joint  DIP and PIP joint tests normal  Painful passive ROM left middle finger  Swollen left middle finger  Left 2 +radial pulse     Skin:     General: Skin is warm and dry.     Neurological:      Mental Status: She is alert.     Psychiatric:         Mood and Affect: Mood normal.       Gina Cazares PA-C    "

## 2025-06-10 NOTE — ASSESSMENT & PLAN NOTE
Acute on chronic, symptomatic  Patient feels like she is having waking from BuSpar  -Switch from BuSpar to hydroxyzine  Educated patient on medication usage, side effects, administration, risks and benefits. Patient stated understanding.    Orders:  •  hydrOXYzine HCL (ATARAX) 10 mg tablet; Take 1 tablet (10 mg total) by mouth every 6 (six) hours as needed for itching

## 2025-06-11 NOTE — TELEPHONE ENCOUNTER
Ubrelvy approved from 6/10/25 to 6/10/26. Please see approval letter under Media tab.    Called pharmacy to make aware and left a detailed VM with a call back # if any further assistance is required.

## 2025-06-16 ENCOUNTER — APPOINTMENT (OUTPATIENT)
Age: 60
End: 2025-06-16
Attending: PHYSICIAN ASSISTANT
Payer: COMMERCIAL

## 2025-06-16 ENCOUNTER — RESULTS FOLLOW-UP (OUTPATIENT)
Dept: FAMILY MEDICINE CLINIC | Facility: CLINIC | Age: 60
End: 2025-06-16

## 2025-06-16 DIAGNOSIS — S69.92XA FINGER INJURY, LEFT, INITIAL ENCOUNTER: Primary | ICD-10-CM

## 2025-06-16 NOTE — TELEPHONE ENCOUNTER
Lorne Rapp, your x-ray surprisingly showed no fracture or dislocation.  Are you still having pain and swelling?  If you are, I recommend referral to orthopedic.

## 2025-06-20 DIAGNOSIS — G43.709 CHRONIC MIGRAINE WITHOUT AURA WITHOUT STATUS MIGRAINOSUS, NOT INTRACTABLE: ICD-10-CM

## 2025-06-20 RX ORDER — GALCANEZUMAB 120 MG/ML
INJECTION, SOLUTION SUBCUTANEOUS
Qty: 1 ML | Refills: 0 | Status: SHIPPED | OUTPATIENT
Start: 2025-06-20

## 2025-06-25 ENCOUNTER — APPOINTMENT (OUTPATIENT)
Age: 60
End: 2025-06-25
Attending: PHYSICIAN ASSISTANT
Payer: COMMERCIAL

## 2025-06-30 ENCOUNTER — APPOINTMENT (OUTPATIENT)
Age: 60
End: 2025-06-30
Attending: PHYSICIAN ASSISTANT
Payer: COMMERCIAL

## 2025-07-02 ENCOUNTER — OFFICE VISIT (OUTPATIENT)
Age: 60
End: 2025-07-02
Attending: PHYSICIAN ASSISTANT
Payer: COMMERCIAL

## 2025-07-02 DIAGNOSIS — N39.46 MIXED INCONTINENCE: ICD-10-CM

## 2025-07-02 DIAGNOSIS — N32.81 OVERACTIVE BLADDER: Primary | ICD-10-CM

## 2025-07-02 PROCEDURE — 97110 THERAPEUTIC EXERCISES: CPT | Performed by: PHYSICAL THERAPIST

## 2025-07-02 PROCEDURE — 97112 NEUROMUSCULAR REEDUCATION: CPT | Performed by: PHYSICAL THERAPIST

## 2025-07-02 NOTE — PROGRESS NOTES
"Daily Note     Today's date: 2025  Patient name: Tamela Carranza  : 1965  MRN: [id number]  Referring provider: Windy Orta PA-C  Dx:   Encounter Diagnosis     ICD-10-CM    1. Overactive bladder  N32.81       2. Mixed incontinence  N39.46                      Subjective: Tamela has been feeling ok. She feels that things are going better, able to hold her urine more. She has been compliant with HEP and Pilates. She does not recall the last time that she had leakage. She urinates every couple hours. She wakes at 3:30 am, not with the need to void, but will go because she's already up.        Objective: See treatment diary below      Assessment: Tolerated treatment well. Patient demonstrated fatigue post treatment, exhibited good technique with therapeutic exercises, and would benefit from continued PT to decrease urinary frequency and leakage. Tamela had good tolerance to progression of exercises. She had good muscle effort in lower abdominals and noted activation without pain. She was given plum TB for HEP and verbalized understanding.      Plan: Continue per plan of care.  Progress treatment as tolerated.  Possible DC to HEP at [place].     Precautions: Problem List[1]        Manuals  6 7/2                                                              Neuro Re-Ed             PF iso stand Submax 5-10W/5-10R            TA iso nv 3\"x10 3\"x10          TA + PF   3\"x10 3\"x10          TA + PF + hip add  3\"x10 3\"x10          TA + PF + hip add + bridge  3\"x10 3\"x10          TA + PF + hip add + bridge + lats  3\"x10 3\"x10          TA + SL STS   20 in. 2x10 ea          Ther Ex             TA + clamshells  20x ea 30x ea          TA + reverse clams  20x ea 30x ea          TA + hip abduction  20x ea 20x ea          TA + PF + TB shoulder ext  Black 20x Black 20x          TA + PF + TB low rows  Black 20x Black 20x          TA + PF + TB push pulls  Black 20x ea Black 20x ea          Side stepping  30 ft x2 laps ea 30 ft " "x2 laps ea, red core          Bear plank   10\"x5          TA + PF + bird dog   10x ea alt                                    Ther Activity             The freya reviewed                         Gait Training                                       Modalities             biofeedback prn                                              [1]   Patient Active Problem List  Diagnosis    Chronic migraine without aura without status migrainosus, not intractable    Gastroesophageal reflux disease without esophagitis    Degenerative cervical disc    Irritable bowel syndrome    Dense breast    Primary open angle glaucoma (POAG) of both eyes    Family history of breast cancer    Coronary artery disease involving native coronary artery of native heart without angina pectoris    Irritability     "

## 2025-07-06 ENCOUNTER — PATIENT MESSAGE (OUTPATIENT)
Dept: FAMILY MEDICINE CLINIC | Facility: CLINIC | Age: 60
End: 2025-07-06

## 2025-07-06 DIAGNOSIS — G47.00 INSOMNIA, UNSPECIFIED TYPE: ICD-10-CM

## 2025-07-06 DIAGNOSIS — Z91.89 HISTORY OF WEIGHT CHANGE: Primary | ICD-10-CM

## 2025-07-08 DIAGNOSIS — R45.4 IRRITABILITY: ICD-10-CM

## 2025-07-09 RX ORDER — HYDROXYZINE HYDROCHLORIDE 10 MG/1
10 TABLET, FILM COATED ORAL EVERY 6 HOURS PRN
Qty: 30 TABLET | Refills: 0 | Status: SHIPPED | OUTPATIENT
Start: 2025-07-09

## 2025-07-09 NOTE — TELEPHONE ENCOUNTER
Requested medication(s) are due for refill today: Yes  **If antibiotic or given during sick visit, contact patient to discuss current symptoms.   **Confirm prescribing provider    LOV:  6/10/2025  **If longer then 1 year, contact patient to schedule annual PRIOR to refilling. Once scheduled, adjust refill for 30 days, no refills.  **Update CareEverywhere to confirm not being seen elsewhere    NOV:  8/14/2025    Is patient due for annual visit: No  **If future appointment, adjust to annual/follow up.  ** No appointment call to schedule annual/follow up.    Route to PCP, unless PCP no longer here, then physician they are seeing next.

## 2025-07-11 ENCOUNTER — APPOINTMENT (OUTPATIENT)
Age: 60
End: 2025-07-11
Payer: COMMERCIAL

## 2025-07-11 DIAGNOSIS — G47.00 INSOMNIA, UNSPECIFIED TYPE: ICD-10-CM

## 2025-07-11 DIAGNOSIS — Z91.89 HISTORY OF WEIGHT CHANGE: ICD-10-CM

## 2025-07-11 PROCEDURE — 82533 TOTAL CORTISOL: CPT

## 2025-07-15 ENCOUNTER — HOSPITAL ENCOUNTER (OUTPATIENT)
Dept: RADIOLOGY | Facility: IMAGING CENTER | Age: 60
Discharge: HOME | End: 2025-07-15
Payer: COMMERCIAL

## 2025-07-15 VITALS — HEIGHT: 65 IN | BODY MASS INDEX: 25.99 KG/M2 | WEIGHT: 156 LBS

## 2025-07-15 DIAGNOSIS — Z78.0 POSTMENOPAUSAL: ICD-10-CM

## 2025-07-15 PROCEDURE — 77080 DXA BONE DENSITY AXIAL: CPT

## 2025-07-16 DIAGNOSIS — G43.709 CHRONIC MIGRAINE WITHOUT AURA WITHOUT STATUS MIGRAINOSUS, NOT INTRACTABLE: ICD-10-CM

## 2025-07-17 RX ORDER — GALCANEZUMAB 120 MG/ML
INJECTION, SOLUTION SUBCUTANEOUS
Qty: 3 ML | Refills: 1 | Status: SHIPPED | OUTPATIENT
Start: 2025-07-17

## 2025-07-18 ENCOUNTER — OFFICE VISIT (OUTPATIENT)
Age: 60
End: 2025-07-18
Attending: PHYSICIAN ASSISTANT
Payer: COMMERCIAL

## 2025-07-18 DIAGNOSIS — N32.81 OVERACTIVE BLADDER: Primary | ICD-10-CM

## 2025-07-18 DIAGNOSIS — N39.46 MIXED INCONTINENCE: ICD-10-CM

## 2025-07-18 LAB
CORTIS BS SAL-MCNC: 0.29 UG/DL
CORTIS SP1 P CHAL SAL-MCNC: 0.07 UG/DL
CORTIS SP2 P CHAL SAL-MCNC: 0.06 UG/DL

## 2025-07-18 PROCEDURE — 97112 NEUROMUSCULAR REEDUCATION: CPT | Performed by: PHYSICAL THERAPIST

## 2025-07-18 PROCEDURE — 97110 THERAPEUTIC EXERCISES: CPT | Performed by: PHYSICAL THERAPIST

## 2025-07-18 NOTE — PROGRESS NOTES
Daily Note and Discharge    Today's date: 2025  Patient name: Tamela Carranza  : 1965  MRN: [id number]  Referring provider: Windy Orta PA-C  Dx:   Encounter Diagnosis     ICD-10-CM    1. Overactive bladder  N32.81       2. Mixed incontinence  N39.46                      Subjective: Tamela has been feeling good. She denies leakage, feels that she can hold her urine longer. She hasn't really woken up at night since LV. She has full bladder when she wakes in the morning, is waking up dry. She had her cortisol levels checked, does not have results. She had DEXA scan, does not have results of that yet either. She hasn't gone on a long walk recently. She has been able to attend [employer] and [employer] without needing to leave during to urinate.      Objective: See treatment diary below    Patient Goals:     Patient goals for therapy:  Fully empty bladder or bowels- met; improved bladder or bowel function- met; and improved quality of life- met    Other patient goals:  Be able to not dribble- met; not needing to wear a pad when going for a walk- met; more control- met    Goals  Short term goals (to be met in 4 weeks):  1. Pt will improve pelvic floor strength by at least 1 grade.- did not retest  2. Pt will walk for fitness without leakage.- met    Long term goals (to be met by discharge):  1. Pt will be compliant with HEP.- met  2. Pt will cough and sneeze without leakage.- met  3. Pt will perform all athletic activities without leakage.- met    Assessment: Tolerated treatment well. Patient demonstrated fatigue post treatment and exhibited good technique with therapeutic exercises to decrease urinary frequency and leakage. Tamela demonstrates good form throughout session. She has HEP and bands and feels confident with her program. At this time, she has reached maximal benefit of skilled physical therapy and will be discharged. She is agreeable with this plan and will contact office with any additional  "concerns.      Plan: Discharge to comprehensive HEP.     Precautions: Problem List[1]        Manuals 6/4 6/9 7/2 7/18                                                             Neuro Re-Ed             PF iso stand Submax 5-10W/5-10R            TA iso nv 3\"x10 3\"x10 Seated 3\"x10         TA + PF   3\"x10 3\"x10 Seated 3\"x10         TA + PF + hip add  3\"x10 3\"x10 Seated 3\"x10         TA + PF + hip add + bridge  3\"x10 3\"x10 np         TA + PF + hip add + bridge + lats  3\"x10 3\"x10 np         TA + SL STS   20 in. 2x10 ea 10x ea         Ther Ex             TA + clamshells  20x ea 30x ea 30x ea         TA + reverse clams  20x ea 30x ea 30x ea         TA + hip abduction  20x ea 20x ea 20x ea         TA + PF + TB shoulder ext  Black 20x Black 20x Black 20x         TA + PF + TB low rows  Black 20x Black 20x Black 20x         TA + PF + TB push pulls  Black 20x ea Black 20x ea Black 20x ea         Side stepping  30 ft x2 laps ea 30 ft x2 laps ea, red core 30 ft x2 laps ea, red core         Bear plank   10\"x5 10\"x5         TA + PF + bird dog   10x ea alt 10x ea alt                                   Ther Activity             The freya reviewed                         Gait Training                                       Modalities             biofeedback prn                                              [1]   Patient Active Problem List  Diagnosis    Chronic migraine without aura without status migrainosus, not intractable    Gastroesophageal reflux disease without esophagitis    Degenerative cervical disc    Irritable bowel syndrome    Dense breast    Primary open angle glaucoma (POAG) of both eyes    Family history of breast cancer    Coronary artery disease involving native coronary artery of native heart without angina pectoris    Irritability     "

## 2025-07-23 PROBLEM — M85.89 OSTEOPENIA OF MULTIPLE SITES: Status: ACTIVE | Noted: 2025-07-23

## 2025-07-24 ENCOUNTER — OFFICE VISIT (OUTPATIENT)
Dept: NEUROLOGY | Facility: CLINIC | Age: 60
End: 2025-07-24
Payer: COMMERCIAL

## 2025-07-24 VITALS
HEART RATE: 71 BPM | BODY MASS INDEX: 26.49 KG/M2 | SYSTOLIC BLOOD PRESSURE: 116 MMHG | DIASTOLIC BLOOD PRESSURE: 80 MMHG | HEIGHT: 65 IN | OXYGEN SATURATION: 95 % | WEIGHT: 159 LBS | TEMPERATURE: 97.9 F

## 2025-07-24 DIAGNOSIS — H02.409 PTOSIS: ICD-10-CM

## 2025-07-24 DIAGNOSIS — R53.83 FATIGUE: Primary | ICD-10-CM

## 2025-07-24 DIAGNOSIS — G43.709 CHRONIC MIGRAINE WITHOUT AURA WITHOUT STATUS MIGRAINOSUS, NOT INTRACTABLE: ICD-10-CM

## 2025-07-24 PROCEDURE — 99214 OFFICE O/P EST MOD 30 MIN: CPT | Performed by: NURSE PRACTITIONER

## 2025-07-24 RX ORDER — NORTRIPTYLINE HYDROCHLORIDE 10 MG/1
10 CAPSULE ORAL
Qty: 30 CAPSULE | Refills: 0 | Status: SHIPPED | OUTPATIENT
Start: 2025-07-24 | End: 2025-08-23

## 2025-07-24 NOTE — ASSESSMENT & PLAN NOTE
Orders:    Ubrogepant (UBRELVY) 100 MG tablet; Take 1 tablet (100 mg) one time as needed for migraine. May repeat one additional tablet (100 mg) at least two hours after the first dose. Do not use more than two doses per day, or for more than eight days per month.    nortriptyline (PAMELOR) 10 mg capsule; Take 1 capsule (10 mg total) by mouth daily at bedtime

## 2025-07-24 NOTE — PATIENT INSTRUCTIONS
Reduce nortriptyline to 10mg for 1 month and then can discontinue (if worsening symptoms after this we could increase effexor to 150mg as discussed).  Continue emgality./as needed ubrelvy  Consider the rematee sleep solutions   Let us know if any changes  Follow up in 6 months

## 2025-07-24 NOTE — PROGRESS NOTES
Name: Tamela Carranza      : 1965      MRN: [id number]  Encounter Provider: JEANNIE Bolanos  Encounter Date: 2025   Encounter department: [facility] [place]  :  Assessment & Plan  Chronic migraine without aura without status migrainosus, not intractable    Orders:    Ubrogepant (UBRELVY) 100 MG tablet; Take 1 tablet (100 mg) one time as needed for migraine. May repeat one additional tablet (100 mg) at least two hours after the first dose. Do not use more than two doses per day, or for more than eight days per month.    nortriptyline (PAMELOR) 10 mg capsule; Take 1 capsule (10 mg total) by mouth daily at bedtime    Fatigue    Orders:    Acetylcholine Receptor (AChR) Binding Antibodies with Reflex to MuSK Antibodies; Future    Ptosis    Orders:    Acetylcholine Receptor (AChR) Binding Antibodies with Reflex to MuSK Antibodies; Future      Assessment & Plan  1. Chronic migraine.  She has been experiencing chronic migraines, with stress identified as a significant trigger. She has been on Emgality for approximately a year, which has reduced the frequency of her migraines compared to the past. She also takes various vitamins and supplements, including magnesium, B2, D3, and B12. Nortriptyline and venlafaxine are part of her current regimen. She reports that nortriptyline does not significantly help with sleep and she still wakes up around 3:30-4:00 AM. The potential benefits of Botox for chronic migraines were discussed, but she prefers to continue with her current treatment plan for now. She was advised to take Ubrelvy more regularly during the week before her next Emgality injection when her migraines tend to worsen. A trial reduction of nortriptyline to 10 mg for a month was suggested, after which she can discontinue it . The Effexor dosage will remain the same. If discontinuing nortriptyline exacerbates her symptoms, an increase in the Effexor dosage may be considered. A  refill of Ubrelvy was provided.    2. Ptosis   Has had this for some time-bilateral; does not fluctuate in severity and does not cause diplopia. With fatigue also. She had eye exam-procedure to help but it has not helped fully. Discussed will rule out MG.       Patient Instructions   Reduce nortriptyline to 10mg for 1 month and then can discontinue (if worsening symptoms after this we could increase effexor to 150mg as discussed).  Continue emgality./as needed ubrelvy  Consider the rematee sleep solutions   Let us know if any changes  Follow up in 6 months     History of Present Illness   History of Present Illness  The patient presents for evaluation of headaches.    She has been experiencing an increase in migraines, although not as severe as before starting Emgality. She has been on Emgality for approximately a year and supplements with vitamins, magnesium, B2, D3, and B12. Her current medications include nortriptyline and venlafaxine. She previously took Topamax, which initially provided relief but later became ineffective. She is uncertain about the continued need for nortriptyline and is considering discontinuing some medications. She finds Effexor more effective than other treatments. Stress is identified as a major trigger for her migraines. Nortriptyline does not aid her sleep, and she often wakes up around 3:30 or 4:00 AM.  Previous sleep study showed mild sleep apnea especially when supine.     She is currently going through menopause and has not menstruated since undergoing ablation. She experiences hot flashes and cold sweats, symptoms that have persisted for at least 2 years. She was previously under the care of Dr. Caldwell and is now seeing Dr. Hodges, who referred her to a menopause specialist. Hormone replacement therapy was attempted but discontinued due to lack of efficacy. She also takes hydroxyzine for anxiety but plans to discuss discontinuation with her family doctor.     The character of her  headaches has remained consistent over the years, typically presenting as temporal pain accompanied by a sensation of a vice-like  on her head. Occasionally, she experiences blurred vision and nausea, but vomiting is rare. She once had a severe headache that was initially misdiagnosed as Bell's palsy due to facial weakness and numbness. Ubrelvy has been effective in managing her acute pain, but she prefers not to use it frequently. She finds that lying down and staying hydrated can sometimes alleviate her symptoms. She has tried triptan medications in the past and was previously on Nurtec. She has considered Botox treatment but is hesitant to try it at this time.    SOCIAL HISTORY:    Occupations: Works in a school district    Coffee/Tea/Caffeine-containing Drinks: Drinks coffee    Sleep: Wakes up at night (related to mild sleep apnea vs perimenopausal symptoms)      Previous Migraine medications:  Topamax- 100mg cause side effects 75mg didn't help  Amitriptyline/nortriptyline  Antihypertensives avoided because of lower bp's  Frova/maxalt-states in past did have more complex migraine symptoms including facial weakness  Cyproheptadine  Toradol     MRI brain 2017:  Normal MR of the brain.     HPI   Review of Systems   Constitutional: Negative.    HENT: Negative.     Eyes: Negative.    Respiratory: Negative.     Cardiovascular: Negative.    Gastrointestinal:  Positive for nausea (with migraines).   Endocrine: Negative.    Genitourinary: Negative.    Musculoskeletal: Negative.    Skin: Negative.    Allergic/Immunologic: Negative.    Neurological: Negative.    Hematological: Negative.    Psychiatric/Behavioral: Negative.        Sensitivity to light, noise and strong smells with migraines    I have personally reviewed the MA's review of systems and made changes as necessary.    Medications Ordered Prior to Encounter[1]   Social History[2]     Objective   /80 (BP Location: Right arm, Patient Position: Sitting,  "Cuff Size: Standard)   Pulse 71   Temp 97.9 °F (36.6 °C) (Temporal)   Ht 5' 5.25\" (1.657 m)   Wt 72.1 kg (159 lb)   LMP  (LMP Unknown)   SpO2 95%   BMI 26.26 kg/m²     Physical Exam  Vitals reviewed.   Constitutional:       General: She is not in acute distress.  HENT:      Head: Normocephalic and atraumatic.      Right Ear: External ear normal.      Left Ear: External ear normal.      Nose: Nose normal.      Mouth/Throat:      Pharynx: Oropharynx is clear.     Eyes:      General:         Right eye: No discharge.         Left eye: No discharge.      Extraocular Movements: EOM normal. No nystagmus.       Cardiovascular:      Rate and Rhythm: Normal rate and regular rhythm.      Pulses: Normal pulses.      Heart sounds: Normal heart sounds.   Pulmonary:      Effort: Pulmonary effort is normal.      Breath sounds: Normal breath sounds.   Abdominal:      General: There is no distension.      Tenderness: There is no abdominal tenderness.     Musculoskeletal:      Cervical back: Normal range of motion.      Right lower leg: No edema.      Left lower leg: No edema.     Skin:     Capillary Refill: Capillary refill takes less than 2 seconds.      Findings: No rash.     Neurological:      Mental Status: She is alert.      Motor: Motor strength is normal.     Coordination: Romberg sign negative.     Psychiatric:         Mood and Affect: Mood normal.         Speech: Speech normal.       Neurological Exam  Mental Status  Alert. Oriented to person, place and time. Speech is normal. Language is fluent with no aphasia. Fund of knowledge is appropriate for level of education.    Cranial Nerves  CN II: Right visual acuity: Counts fingers. Left visual acuity: Counts fingers.  CN III, IV, VI: Extraocular movements intact bilaterally. No nystagmus. Bilateral ptosis.   Right pupil: 4 mm. Round. Reactive to light.   Left pupil: 4 mm. Round. Reactive to light.  CN V: Facial sensation is normal.  CN VII: Full and symmetric facial " movement.  CN VIII: Hearing is normal.  CN IX, X: Palate elevates symmetrically  CN XI: Shoulder shrug strength is normal.  CN XII: Tongue midline without atrophy or fasciculations.    Motor  Normal muscle bulk throughout. Strength is 5/5 throughout all four extremities.    Sensory  Light touch is normal in upper and lower extremities.     Coordination  Right: Rapid alternating movement normal.Left: Rapid alternating movement normal.    Gait  Casual gait is normal including stance, stride, and arm swing. Normal tandem gait. Romberg is absent. Able to rise from chair without using arms.             [1]   Current Outpatient Medications on File Prior to Visit   Medication Sig Dispense Refill    ascorbic acid (VITAMIN C) 500 MG tablet Take 1 tablet (500 mg total) by mouth 2 (two) times a day 60 tablet 3    cholecalciferol (VITAMIN D3) 400 units tablet Take 400 Units by mouth in the morning.      Cyanocobalamin (VITAMIN B12 PO) Take 1 tablet by mouth in the morning.      cycloSPORINE (RESTASIS) 0.05 % ophthalmic emulsion       dicyclomine (BENTYL) 20 mg tablet Take 1 tablet (20 mg total) by mouth 3 (three) times a day as needed (abdominal cramping) 30 tablet 5    estradiol (ESTRACE VAGINAL) 0.1 mg/g vaginal cream Insert 1 g into the vagina 3 (three) times a week 42.5 g 0    Galcanezumab-gnlm (Emgality) 120 MG/ML SOAJ Inject 1 ml under the skin monthly 3 mL 1    hydrOXYzine HCL (ATARAX) 10 mg tablet Take 1 tablet (10 mg total) by mouth every 6 (six) hours as needed for itching 30 tablet 0    latanoprost (XALATAN) 0.005 % ophthalmic solution 1 drop daily at bedtime      magnesium oxide (MAG-OX) 400 mg Take 250 mg by mouth in the morning.      methenamine hippurate (HIPREX) 1 g tablet Take 1 tablet (1 g total) by mouth 2 (two) times a day with meals 60 tablet 5    Mirabegron ER (Myrbetriq) 50 MG TB24 Take 1 tablet (50 mg total) by mouth in the morning 30 tablet 5    Riboflavin (VITAMIN B-2 PO) Take 1 tablet by mouth in the  morning.      rosuvastatin (CRESTOR) 5 mg tablet Take 1 tablet (5 mg total) by mouth daily 90 tablet 3    Varenicline Tartrate (Tyrvaya) 0.03 MG/ACT SOLN 1 spray into each nostril in the morning and 1 spray before bedtime.      venlafaxine (EFFEXOR-XR) 37.5 mg 24 hr capsule Take 1 capsule (37.5 mg total) by mouth daily Take in addition to 75 mg capsule (total of 112.5 mg daily) 90 capsule 2    venlafaxine (EFFEXOR-XR) 75 mg 24 hr capsule Take 1 capsule (75 mg total) by mouth daily 90 capsule 2    [DISCONTINUED] nortriptyline (PAMELOR) 25 mg capsule Take 1 capsule (25 mg total) by mouth daily at bedtime 90 capsule 2    [DISCONTINUED] Ubrogepant (UBRELVY) 100 MG tablet Take 1 tablet (100 mg) one time as needed for migraine. May repeat one additional tablet (100 mg) at least two hours after the first dose. Do not use more than two doses per day, or for more than eight days per month. 8 tablet 0    busPIRone (BUSPAR) 5 mg tablet Take 2 tablets (10 mg total) by mouth 2 (two) times a day 180 tablet 1     No current facility-administered medications on file prior to visit.   [2]   Social History  Tobacco Use    Smoking status: Never    Smokeless tobacco: Never   Vaping Use    Vaping status: Never Used   Substance and Sexual Activity    Alcohol use: Yes     Alcohol/week: 1.0 standard drink of alcohol     Types: 1 Cans of beer per week     Comment: social     Drug use: No    Sexual activity: Yes     Partners: Male     Birth control/protection: Male Sterilization

## 2025-07-25 ENCOUNTER — APPOINTMENT (OUTPATIENT)
Dept: LAB | Age: 60
End: 2025-07-25
Payer: COMMERCIAL

## 2025-07-25 DIAGNOSIS — R53.83 FATIGUE: ICD-10-CM

## 2025-07-25 DIAGNOSIS — H02.409 PTOSIS: ICD-10-CM

## 2025-07-25 PROCEDURE — 86041 ACETYLCHOLN RCPTR BNDNG ANTB: CPT

## 2025-07-25 PROCEDURE — 36415 COLL VENOUS BLD VENIPUNCTURE: CPT

## 2025-07-25 PROCEDURE — 86366 MUSCLE-SPECIFIC KINASE ANTB: CPT | Performed by: NURSE PRACTITIONER

## 2025-07-28 ENCOUNTER — OFFICE VISIT (OUTPATIENT)
Dept: OBGYN CLINIC | Facility: HOSPITAL | Age: 60
End: 2025-07-28
Payer: COMMERCIAL

## 2025-07-28 VITALS — WEIGHT: 152 LBS | HEIGHT: 65 IN | BODY MASS INDEX: 25.33 KG/M2

## 2025-07-28 DIAGNOSIS — M19.042 ARTHRITIS OF FINGER OF LEFT HAND: Primary | ICD-10-CM

## 2025-07-28 PROCEDURE — 99243 OFF/OP CNSLTJ NEW/EST LOW 30: CPT | Performed by: SURGERY

## 2025-07-31 ENCOUNTER — NURSE TRIAGE (OUTPATIENT)
Dept: NEUROLOGY | Facility: CLINIC | Age: 60
End: 2025-07-31

## 2025-08-01 ENCOUNTER — NURSE TRIAGE (OUTPATIENT)
Age: 60
End: 2025-08-01

## 2025-08-08 LAB — MISCELLANEOUS LAB TEST RESULT: NORMAL

## 2025-08-14 ENCOUNTER — OFFICE VISIT (OUTPATIENT)
Dept: FAMILY MEDICINE CLINIC | Facility: CLINIC | Age: 60
End: 2025-08-14
Payer: COMMERCIAL

## 2025-08-19 ENCOUNTER — APPOINTMENT (OUTPATIENT)
Age: 60
End: 2025-08-19
Payer: COMMERCIAL

## 2025-08-19 DIAGNOSIS — E53.8 VITAMIN B12 DEFICIENCY: ICD-10-CM

## 2025-08-19 DIAGNOSIS — E78.2 MIXED HYPERLIPIDEMIA: ICD-10-CM

## 2025-08-19 DIAGNOSIS — N95.1 VASOMOTOR SYMPTOMS DUE TO MENOPAUSE: ICD-10-CM

## 2025-08-19 LAB
CHOLEST SERPL-MCNC: 157 MG/DL (ref ?–200)
FSH SERPL-ACNC: 66.7 MIU/ML
HDLC SERPL-MCNC: 66 MG/DL
LDLC SERPL CALC-MCNC: 75 MG/DL (ref 0–100)
NONHDLC SERPL-MCNC: 91 MG/DL
TRIGL SERPL-MCNC: 78 MG/DL (ref ?–150)
VIT B12 SERPL-MCNC: 338 PG/ML (ref 180–914)

## 2025-08-19 PROCEDURE — 83001 ASSAY OF GONADOTROPIN (FSH): CPT

## 2025-08-19 PROCEDURE — 80061 LIPID PANEL: CPT

## 2025-08-19 PROCEDURE — 82607 VITAMIN B-12: CPT

## 2025-08-21 ENCOUNTER — RESULTS FOLLOW-UP (OUTPATIENT)
Dept: FAMILY MEDICINE CLINIC | Facility: CLINIC | Age: 60
End: 2025-08-21